# Patient Record
Sex: FEMALE | Race: WHITE | NOT HISPANIC OR LATINO | Employment: OTHER | URBAN - NONMETROPOLITAN AREA
[De-identification: names, ages, dates, MRNs, and addresses within clinical notes are randomized per-mention and may not be internally consistent; named-entity substitution may affect disease eponyms.]

---

## 2018-06-17 ENCOUNTER — OFFICE VISIT (OUTPATIENT)
Dept: URGENT CARE | Facility: PHYSICIAN GROUP | Age: 43
End: 2018-06-17
Payer: COMMERCIAL

## 2018-06-17 VITALS
DIASTOLIC BLOOD PRESSURE: 68 MMHG | WEIGHT: 163 LBS | TEMPERATURE: 98.3 F | SYSTOLIC BLOOD PRESSURE: 114 MMHG | RESPIRATION RATE: 12 BRPM | OXYGEN SATURATION: 95 % | HEART RATE: 94 BPM

## 2018-06-17 DIAGNOSIS — T14.8XXA BRUISING: ICD-10-CM

## 2018-06-17 PROCEDURE — 99204 OFFICE O/P NEW MOD 45 MIN: CPT | Performed by: FAMILY MEDICINE

## 2018-06-17 RX ORDER — LAMOTRIGINE 100 MG/1
TABLET ORAL
COMMUNITY
Start: 2018-05-26 | End: 2018-07-06 | Stop reason: SDUPTHER

## 2018-06-17 RX ORDER — GABAPENTIN 600 MG/1
TABLET ORAL
COMMUNITY
Start: 2018-06-12 | End: 2018-07-06 | Stop reason: SDUPTHER

## 2018-06-17 RX ORDER — CYCLOBENZAPRINE HCL 10 MG
TABLET ORAL
COMMUNITY
Start: 2018-06-12 | End: 2018-09-26 | Stop reason: SDUPTHER

## 2018-06-17 RX ORDER — TRAZODONE HYDROCHLORIDE 100 MG/1
TABLET ORAL
COMMUNITY
Start: 2018-04-24 | End: 2018-07-06 | Stop reason: SDUPTHER

## 2018-06-17 NOTE — PROGRESS NOTES
Chief Complaint:    Chief Complaint   Patient presents with   • Bleeding/Bruising     unexplained bruising       History of Present Illness:    This is a new problem. She has unpredictable bruising involving legs and feet bilaterally since April 2018. When the bruising is there, it can be tender. Overall problem is at least moderate severity and not getting better. No similar prior episodes. Nothing has been helpful.       Review of Systems:    Constitutional: Negative for fever, chills, and diaphoresis.   Eyes: Negative for change in vision, photophobia, pain, redness, and discharge.  ENT: Negative for ear pain, ear discharge, hearing loss, tinnitus, nasal congestion, nosebleeds, and sore throat.    Respiratory: Negative for cough, hemoptysis, sputum production, shortness of breath, wheezing, and stridor.    Cardiovascular: Negative for chest pain, palpitations, orthopnea, claudication, leg swelling, and PND.   Gastrointestinal: Negative for abdominal pain, nausea, vomiting, diarrhea, constipation, blood in stool, and melena.   Genitourinary: Negative for dysuria, urinary urgency, urinary frequency, hematuria, and flank pain.   Musculoskeletal: See HPI.  Skin: See HPI.  Neurological: Negative for dizziness, tingling, tremors, sensory change, speech change, focal weakness, loss of consciousness, and headaches.   Endo: Negative for polydipsia.   Heme: Does not bruise/bleed easily.   Psychiatric/Behavioral: No new symptoms.      Past Medical History:    History reviewed. No pertinent past medical history.    Past Surgical History:    History reviewed. No pertinent surgical history.    Social History:    Social History     Social History   • Marital status: Unknown     Spouse name: N/A   • Number of children: N/A   • Years of education: N/A     Social History Main Topics   • Smoking status: Never Smoker   • Smokeless tobacco: Never Used   • Alcohol use No   • Drug use: No   • Sexual activity: Not on file     Other  Topics Concern   • Not on file     Social History Narrative   • No narrative on file     Family History:    History reviewed. No pertinent family history.    Medications:    Cyclobenzaprine 10 mg  Gabapentin 600 mg  Lamictal 100 mg  Lorazepam  Loratadine  Fluticasone nasal spray    Allergies:    Allergies   Allergen Reactions   • Codeine        Vitals:    Vitals:    06/17/18 1328   BP: 114/68   Pulse: 94   Resp: 12   Temp: 36.8 °C (98.3 °F)   SpO2: 95%   Weight: 73.9 kg (163 lb)       Physical Exam:    Constitutional: Vital signs reviewed. Appears well-developed and well-nourished. No acute distress.   Eyes: Sclera white, conjunctivae clear.   ENT: External ears normal. Hearing normal.  Neck: Neck supple.   Pulmonary/Chest: Respirations non-labored.  Musculoskeletal: Normal gait. Normal range of motion. No muscular atrophy or weakness.  Neurological: Alert and oriented to person, place, and time. Muscle tone normal. Coordination normal.   Skin: Scattered fading bruises on both legs.  Psychiatric: Normal mood and affect. Behavior is normal. Judgment and thought content normal.       Assessment / Plan:    1. Bruising  - COMP METABOLIC PANEL; Future  - CBC WITH DIFFERENTIAL; Future  - TSH WITH REFLEX TO FT4; Future  - PT AND PTT      Discussed with her DDX and management options.    Advised ? etiology and I can order some labs to rule out conditions, understanding labs may be non-revealing.    Agreeable to labs ordered which she will likely return for tomorrow as we do not have lab here today.    Discussed possible referral to Hematologist for further evaluation - she does not want now.    Will contact her with lab results.

## 2018-06-19 ENCOUNTER — HOSPITAL ENCOUNTER (OUTPATIENT)
Dept: LAB | Facility: MEDICAL CENTER | Age: 43
End: 2018-06-19
Attending: FAMILY MEDICINE
Payer: COMMERCIAL

## 2018-06-19 DIAGNOSIS — R23.3 EASY BRUISING: ICD-10-CM

## 2018-06-19 DIAGNOSIS — R79.1 ELEVATED PARTIAL THROMBOPLASTIN TIME (PTT): ICD-10-CM

## 2018-06-19 DIAGNOSIS — T14.8XXA BRUISING: ICD-10-CM

## 2018-06-19 LAB
ALBUMIN SERPL BCP-MCNC: 3.8 G/DL (ref 3.2–4.9)
ALBUMIN/GLOB SERPL: 0.8 G/DL
ALP SERPL-CCNC: 108 U/L (ref 30–99)
ALT SERPL-CCNC: 48 U/L (ref 2–50)
ANION GAP SERPL CALC-SCNC: 4 MMOL/L (ref 0–11.9)
APTT PPP: 50.9 SEC (ref 24.7–36)
AST SERPL-CCNC: 44 U/L (ref 12–45)
BASOPHILS # BLD AUTO: 0.3 % (ref 0–1.8)
BASOPHILS # BLD: 0.01 K/UL (ref 0–0.12)
BILIRUB SERPL-MCNC: 0.3 MG/DL (ref 0.1–1.5)
BUN SERPL-MCNC: 8 MG/DL (ref 8–22)
CALCIUM SERPL-MCNC: 8.9 MG/DL (ref 8.5–10.5)
CHLORIDE SERPL-SCNC: 107 MMOL/L (ref 96–112)
CO2 SERPL-SCNC: 25 MMOL/L (ref 20–33)
CREAT SERPL-MCNC: 0.63 MG/DL (ref 0.5–1.4)
EOSINOPHIL # BLD AUTO: 0.07 K/UL (ref 0–0.51)
EOSINOPHIL NFR BLD: 2 % (ref 0–6.9)
ERYTHROCYTE [DISTWIDTH] IN BLOOD BY AUTOMATED COUNT: 50.6 FL (ref 35.9–50)
GLOBULIN SER CALC-MCNC: 4.7 G/DL (ref 1.9–3.5)
GLUCOSE SERPL-MCNC: 96 MG/DL (ref 65–99)
HCT VFR BLD AUTO: 37.6 % (ref 37–47)
HGB BLD-MCNC: 12.1 G/DL (ref 12–16)
IMM GRANULOCYTES # BLD AUTO: 0.02 K/UL (ref 0–0.11)
IMM GRANULOCYTES NFR BLD AUTO: 0.6 % (ref 0–0.9)
INR PPP: 1.05 (ref 0.87–1.13)
LYMPHOCYTES # BLD AUTO: 1.57 K/UL (ref 1–4.8)
LYMPHOCYTES NFR BLD: 44.1 % (ref 22–41)
MCH RBC QN AUTO: 31.9 PG (ref 27–33)
MCHC RBC AUTO-ENTMCNC: 32.2 G/DL (ref 33.6–35)
MCV RBC AUTO: 99.2 FL (ref 81.4–97.8)
MONOCYTES # BLD AUTO: 0.17 K/UL (ref 0–0.85)
MONOCYTES NFR BLD AUTO: 4.8 % (ref 0–13.4)
NEUTROPHILS # BLD AUTO: 1.72 K/UL (ref 2–7.15)
NEUTROPHILS NFR BLD: 48.2 % (ref 44–72)
NRBC # BLD AUTO: 0 K/UL
NRBC BLD-RTO: 0 /100 WBC
PLATELET # BLD AUTO: 171 K/UL (ref 164–446)
PMV BLD AUTO: 11.5 FL (ref 9–12.9)
POTASSIUM SERPL-SCNC: 3.9 MMOL/L (ref 3.6–5.5)
PROT SERPL-MCNC: 8.5 G/DL (ref 6–8.2)
PROTHROMBIN TIME: 13.4 SEC (ref 12–14.6)
RBC # BLD AUTO: 3.79 M/UL (ref 4.2–5.4)
SODIUM SERPL-SCNC: 136 MMOL/L (ref 135–145)
TSH SERPL DL<=0.005 MIU/L-ACNC: 1.65 UIU/ML (ref 0.38–5.33)
WBC # BLD AUTO: 3.6 K/UL (ref 4.8–10.8)

## 2018-06-19 PROCEDURE — 36415 COLL VENOUS BLD VENIPUNCTURE: CPT

## 2018-06-19 PROCEDURE — 80053 COMPREHEN METABOLIC PANEL: CPT

## 2018-06-19 PROCEDURE — 85730 THROMBOPLASTIN TIME PARTIAL: CPT

## 2018-06-19 PROCEDURE — 85610 PROTHROMBIN TIME: CPT

## 2018-06-19 PROCEDURE — 84443 ASSAY THYROID STIM HORMONE: CPT

## 2018-06-19 PROCEDURE — 85025 COMPLETE CBC W/AUTO DIFF WBC: CPT

## 2018-06-21 ENCOUNTER — TELEPHONE (OUTPATIENT)
Dept: HEMATOLOGY ONCOLOGY | Facility: MEDICAL CENTER | Age: 43
End: 2018-06-21

## 2018-06-22 NOTE — TELEPHONE ENCOUNTER
1st attempt to contact the patient.  LM on voicemail for patient requesting a call back to schedule a new patient hematology appointment.  NP/ Demetri/ Anusha/ Derek Paez- could be seen [place] if interested

## 2018-06-25 ENCOUNTER — HOSPITAL ENCOUNTER (OUTPATIENT)
Dept: LAB | Facility: MEDICAL CENTER | Age: 43
End: 2018-06-25
Attending: INTERNAL MEDICINE
Payer: COMMERCIAL

## 2018-06-25 ENCOUNTER — OFFICE VISIT (OUTPATIENT)
Dept: HEMATOLOGY ONCOLOGY | Facility: MEDICAL CENTER | Age: 43
End: 2018-06-25
Payer: COMMERCIAL

## 2018-06-25 ENCOUNTER — NON-PROVIDER VISIT (OUTPATIENT)
Dept: HEMATOLOGY ONCOLOGY | Facility: MEDICAL CENTER | Age: 43
End: 2018-06-25
Payer: COMMERCIAL

## 2018-06-25 ENCOUNTER — HOSPITAL ENCOUNTER (OUTPATIENT)
Facility: MEDICAL CENTER | Age: 43
End: 2018-06-25
Attending: INTERNAL MEDICINE
Payer: COMMERCIAL

## 2018-06-25 VITALS
TEMPERATURE: 99 F | HEIGHT: 64 IN | HEART RATE: 115 BPM | BODY MASS INDEX: 28.66 KG/M2 | DIASTOLIC BLOOD PRESSURE: 80 MMHG | WEIGHT: 167.88 LBS | RESPIRATION RATE: 18 BRPM | SYSTOLIC BLOOD PRESSURE: 118 MMHG | OXYGEN SATURATION: 98 %

## 2018-06-25 VITALS
RESPIRATION RATE: 18 BRPM | HEART RATE: 115 BPM | SYSTOLIC BLOOD PRESSURE: 118 MMHG | WEIGHT: 167 LBS | TEMPERATURE: 99 F | BODY MASS INDEX: 28.51 KG/M2 | DIASTOLIC BLOOD PRESSURE: 80 MMHG | OXYGEN SATURATION: 98 % | HEIGHT: 64 IN

## 2018-06-25 DIAGNOSIS — R23.3 ABNORMAL BRUISING: ICD-10-CM

## 2018-06-25 DIAGNOSIS — R23.3 EASY BRUISING: ICD-10-CM

## 2018-06-25 LAB
ALBUMIN SERPL BCP-MCNC: 3.9 G/DL (ref 3.2–4.9)
ALBUMIN/GLOB SERPL: 0.7 G/DL
ALP SERPL-CCNC: 113 U/L (ref 30–99)
ALT SERPL-CCNC: 25 U/L (ref 2–50)
ANION GAP SERPL CALC-SCNC: 4 MMOL/L (ref 0–11.9)
APTT 1H NP PPP: 51.3 SEC (ref 24.7–36)
APTT 1H P INC POOL PPP: 29.7 SEC (ref 24.7–36)
APTT 1H P INC PPP: 55.3 SEC (ref 24.7–36)
APTT IMM NP PPP: 49 SEC (ref 24.7–36)
APTT POOL PPP: 29.2 SEC (ref 24.7–36)
APTT PPP: 51.3 SEC (ref 24.7–36)
APTT PPP: 51.3 SEC (ref 24.7–36)
AST SERPL-CCNC: 28 U/L (ref 12–45)
BASOPHILS # BLD AUTO: 0.3 % (ref 0–1.8)
BASOPHILS # BLD: 0.01 K/UL (ref 0–0.12)
BILIRUB SERPL-MCNC: 0.4 MG/DL (ref 0.1–1.5)
BUN SERPL-MCNC: 10 MG/DL (ref 8–22)
CALCIUM SERPL-MCNC: 9.3 MG/DL (ref 8.5–10.5)
CHLORIDE SERPL-SCNC: 105 MMOL/L (ref 96–112)
CO2 SERPL-SCNC: 26 MMOL/L (ref 20–33)
COMMENT 1642: NORMAL
CREAT SERPL-MCNC: 0.77 MG/DL (ref 0.5–1.4)
EOSINOPHIL # BLD AUTO: 0.08 K/UL (ref 0–0.51)
EOSINOPHIL NFR BLD: 2.3 % (ref 0–6.9)
ERYTHROCYTE [DISTWIDTH] IN BLOOD BY AUTOMATED COUNT: 49.1 FL (ref 35.9–50)
GLOBULIN SER CALC-MCNC: 5.9 G/DL (ref 1.9–3.5)
GLUCOSE SERPL-MCNC: 74 MG/DL (ref 65–99)
HCT VFR BLD AUTO: 37.5 % (ref 37–47)
HGB BLD-MCNC: 12.6 G/DL (ref 12–16)
IMM GRANULOCYTES # BLD AUTO: 0.01 K/UL (ref 0–0.11)
IMM GRANULOCYTES NFR BLD AUTO: 0.3 % (ref 0–0.9)
LYMPHOCYTES # BLD AUTO: 1.67 K/UL (ref 1–4.8)
LYMPHOCYTES NFR BLD: 47.3 % (ref 22–41)
MCH RBC QN AUTO: 32.6 PG (ref 27–33)
MCHC RBC AUTO-ENTMCNC: 33.6 G/DL (ref 33.6–35)
MCV RBC AUTO: 97.2 FL (ref 81.4–97.8)
MONOCYTES # BLD AUTO: 0.19 K/UL (ref 0–0.85)
MONOCYTES NFR BLD AUTO: 5.4 % (ref 0–13.4)
MORPHOLOGY BLD-IMP: NORMAL
NEUTROPHILS # BLD AUTO: 1.57 K/UL (ref 2–7.15)
NEUTROPHILS NFR BLD: 44.4 % (ref 44–72)
NRBC # BLD AUTO: 0 K/UL
NRBC BLD-RTO: 0 /100 WBC
PLATELET # BLD AUTO: 209 K/UL (ref 164–446)
PMV BLD AUTO: 10.3 FL (ref 9–12.9)
POTASSIUM SERPL-SCNC: 4.1 MMOL/L (ref 3.6–5.5)
PROT SERPL-MCNC: 9.8 G/DL (ref 6–8.2)
RBC # BLD AUTO: 3.86 M/UL (ref 4.2–5.4)
SODIUM SERPL-SCNC: 135 MMOL/L (ref 135–145)
WBC # BLD AUTO: 3.5 K/UL (ref 4.8–10.8)

## 2018-06-25 PROCEDURE — 85250 CLOT FACTOR IX PTC/CHRSTMAS: CPT

## 2018-06-25 PROCEDURE — 36415 COLL VENOUS BLD VENIPUNCTURE: CPT

## 2018-06-25 PROCEDURE — 85025 COMPLETE CBC W/AUTO DIFF WBC: CPT

## 2018-06-25 PROCEDURE — 80053 COMPREHEN METABOLIC PANEL: CPT

## 2018-06-25 PROCEDURE — 36415 COLL VENOUS BLD VENIPUNCTURE: CPT | Performed by: INTERNAL MEDICINE

## 2018-06-25 PROCEDURE — 99204 OFFICE O/P NEW MOD 45 MIN: CPT | Performed by: INTERNAL MEDICINE

## 2018-06-25 PROCEDURE — 85730 THROMBOPLASTIN TIME PARTIAL: CPT

## 2018-06-25 PROCEDURE — 85732 THROMBOPLASTIN TIME PARTIAL: CPT

## 2018-06-25 PROCEDURE — 85240 CLOT FACTOR VIII AHG 1 STAGE: CPT

## 2018-06-25 ASSESSMENT — PAIN SCALES - GENERAL
PAINLEVEL: NO PAIN
PAINLEVEL: NO PAIN

## 2018-06-25 NOTE — PROGRESS NOTES
Augusta Real is a 42 y.o. female here for a non-provider visit for: Lab Draws  on 6/25/2018 at 8:53 AM    Procedure Performed: Venipuncture     Anatomical site: Right Antecubital Area (AC)    Equipment used: 21g    Labs drawn: CBC w/diff, CMP and APTT, APTT mixing studies    Ordering Provider: Dr. MARY Leyv    Ren By: Yani Esquivel, Onofre Ass't    Successful blood draw

## 2018-06-25 NOTE — PROGRESS NOTES
Consult Note: Hematology    Date of consultation: 6/25/2018     Referring provider: Derek Paez M.D.    Reason for consultation:   CC: abn bruising    History of presenting illness:   First seen in on 6/25/2018  Augusta Real  is a 42 y.o. year old female seen in clinic today for abnormal bruising seen on her lower legs which stated in April. The patient states she get associated swelling of her legs which comes and goes. No swelling today. bruising has cleared up      Review of Systems:  Constitutional: No fever, chills, weight loss ,malaise/fatigue.      All other review of systems are negative except what was mentioned above in the HPI.    Past Medical History:    Past Medical History:   Diagnosis Date   • Depression    • Fibromyalgia        Past surgical history:    Past Surgical History:   Procedure Laterality Date   • KNEE ARTHROSCOPY     • PRIMARY C SECTION     • TUBAL LIGATION         Allergies:  Codeine    Medications:    Current Outpatient Prescriptions   Medication Sig Dispense Refill   • gabapentin (NEURONTIN) 600 MG tablet      • lamoTRIgine (LAMICTAL) 100 MG Tab      • cyclobenzaprine (FLEXERIL) 10 MG Tab      • traZODone (DESYREL) 100 MG Tab      • LORAZEPAM PO Take  by mouth.     • Fluticasone Propionate (FLONASE NA) Spray  in nose.     • LORATADINE PO Take  by mouth.       No current facility-administered medications for this visit.        Social History:     Social History     Social History   • Marital status: Unknown     Spouse name: N/A   • Number of children: N/A   • Years of education: N/A     Occupational History   • [employer] Other     Social History Main Topics   • Smoking status: Current Every Day Smoker     Packs/day: 1.00     Years: 21.00   • Smokeless tobacco: Never Used   • Alcohol use No   • Drug use: No   • Sexual activity: Yes     Partners: Male     Other Topics Concern   • Not on file     Social History Narrative   • No narrative on file       Family History:     Family  "History   Problem Relation Age of Onset   • Cancer Mother      uterine   • Depression Father        Physical Exam:  Vitals:   /80   Pulse (!) 115   Temp 37.2 °C (99 °F)   Resp 18   Ht 1.633 m (5' 4.3\")   Wt 76.1 kg (167 lb 14.1 oz)   SpO2 98%   BMI 28.55 kg/m²     General: Not in acute distress,   HEENT: No pallor, icterus. Oropharynx clear.   Neck: Supple, no palpable masses.  Lymph nodes: No palpable cervical, supraclavicular, axillary or inguinal lymphadenopathy.    CVS: regular rate and rhythm, no rubs or gallops  RESP: Clear to auscultate bilaterally, no wheezing or crackles.   ABD: Soft, non tender, non distended, positive bowel sounds, no palpable organomegaly  EXT: No edema or cyanosis  CNS: Alert and oriented x3, No focal deficits.  Skin- No rash      Labs:   Hospital Outpatient Visit on 06/19/2018   Component Date Value Ref Range Status   • Sodium 06/19/2018 136  135 - 145 mmol/L Final   • Potassium 06/19/2018 3.9  3.6 - 5.5 mmol/L Final   • Chloride 06/19/2018 107  96 - 112 mmol/L Final   • Co2 06/19/2018 25  20 - 33 mmol/L Final   • Anion Gap 06/19/2018 4.0  0.0 - 11.9 Final   • Glucose 06/19/2018 96  65 - 99 mg/dL Final   • Bun 06/19/2018 8  8 - 22 mg/dL Final   • Creatinine 06/19/2018 0.63  0.50 - 1.40 mg/dL Final   • Calcium 06/19/2018 8.9  8.5 - 10.5 mg/dL Final   • AST(SGOT) 06/19/2018 44  12 - 45 U/L Final   • ALT(SGPT) 06/19/2018 48  2 - 50 U/L Final   • Alkaline Phosphatase 06/19/2018 108* 30 - 99 U/L Final   • Total Bilirubin 06/19/2018 0.3  0.1 - 1.5 mg/dL Final   • Albumin 06/19/2018 3.8  3.2 - 4.9 g/dL Final   • Total Protein 06/19/2018 8.5* 6.0 - 8.2 g/dL Final   • Globulin 06/19/2018 4.7* 1.9 - 3.5 g/dL Final   • A-G Ratio 06/19/2018 0.8  g/dL Final   • WBC 06/19/2018 3.6* 4.8 - 10.8 K/uL Final   • RBC 06/19/2018 3.79* 4.20 - 5.40 M/uL Final   • Hemoglobin 06/19/2018 12.1  12.0 - 16.0 g/dL Final   • Hematocrit 06/19/2018 37.6  37.0 - 47.0 % Final   • MCV 06/19/2018 99.2* 81.4 - " 97.8 fL Final   • MCH 06/19/2018 31.9  27.0 - 33.0 pg Final   • MCHC 06/19/2018 32.2* 33.6 - 35.0 g/dL Final   • RDW 06/19/2018 50.6* 35.9 - 50.0 fL Final   • Platelet Count 06/19/2018 171  164 - 446 K/uL Final   • MPV 06/19/2018 11.5  9.0 - 12.9 fL Final   • Neutrophils-Polys 06/19/2018 48.20  44.00 - 72.00 % Final   • Lymphocytes 06/19/2018 44.10* 22.00 - 41.00 % Final   • Monocytes 06/19/2018 4.80  0.00 - 13.40 % Final   • Eosinophils 06/19/2018 2.00  0.00 - 6.90 % Final   • Basophils 06/19/2018 0.30  0.00 - 1.80 % Final   • Immature Granulocytes 06/19/2018 0.60  0.00 - 0.90 % Final   • Nucleated RBC 06/19/2018 0.00  /100 WBC Final   • Neutrophils (Absolute) 06/19/2018 1.72* 2.00 - 7.15 K/uL Final    Includes immature neutrophils, if present.   • Lymphs (Absolute) 06/19/2018 1.57  1.00 - 4.80 K/uL Final   • Monos (Absolute) 06/19/2018 0.17  0.00 - 0.85 K/uL Final   • Eos (Absolute) 06/19/2018 0.07  0.00 - 0.51 K/uL Final   • Baso (Absolute) 06/19/2018 0.01  0.00 - 0.12 K/uL Final   • Immature Granulocytes (abs) 06/19/2018 0.02  0.00 - 0.11 K/uL Final   • NRBC (Absolute) 06/19/2018 0.00  K/uL Final   • TSH 06/19/2018 1.650  0.380 - 5.330 uIU/mL Final    Comment: Please note new reference ranges effective 12/14/2017 10:00 AM  Pregnant Females, 1st Trimester  0.050-3.700  Pregnant Females, 2nd Trimester  0.310-4.350  Pregnant Females, 3rd Trimester  0.410-5.180     • PT 06/19/2018 13.4  12.0 - 14.6 sec Final   • INR 06/19/2018 1.05  0.87 - 1.13 Final    Comment: INR - Non-therapeutic Reference Range: 0.87-1.13  INR - Therapeutic Reference Range: 2.0-4.0     • APTT 06/19/2018 50.9* 24.7 - 36.0 sec Final    Therapeutic Heparin Range: 63-96 seconds   • GFR If  06/19/2018 >60  >60 mL/min/1.73 m 2 Final   • GFR If Non  06/19/2018 >60  >60 mL/min/1.73 m 2 Final         Imaging:    Assessment and Plan:  -Elevated PTT  -labs above reviewed with pt  -recheck labs  -check mixing study  -follow  up in 2 weeks    -Elevated alk phos  -recheck labs today            She agreed and verbalized her agreement and understanding with the current plan.  I answered all questions and concerns she has at this time.  Dear  Derek Paez M.D.,  Thank you for allowing me to participate in her care.    All labs and/or imaging studies mentioned in the note above were reviewed with and explained to the patient as a pertain to medical decision making.    Please note that this dictation was created using voice recognition software. I have made every reasonable attempt to correct obvious errors, but I expect that there are errors of grammar and possibly content that I did not discover before finalizing the note.      SIGNATURES:  Wilian Levy    CC:  Pcp Pt States None  Derek Paez M.D.

## 2018-06-26 DIAGNOSIS — R23.3 EASY BRUISING: ICD-10-CM

## 2018-06-26 DIAGNOSIS — R79.1 PROLONGED PTT: ICD-10-CM

## 2018-06-26 LAB
FACT IX ACT/NOR PPP: 120 % (ref 75–125)
FACT VIII ACT/NOR PPP: 93 % (ref 45–145)
INHIBITOR INDICATED 1863: NO
INHIBITOR INDICATED 1863: NO

## 2018-07-03 ENCOUNTER — HOSPITAL ENCOUNTER (OUTPATIENT)
Dept: LAB | Facility: MEDICAL CENTER | Age: 43
End: 2018-07-03
Attending: INTERNAL MEDICINE
Payer: COMMERCIAL

## 2018-07-03 DIAGNOSIS — R23.3 EASY BRUISING: ICD-10-CM

## 2018-07-03 DIAGNOSIS — R79.1 PROLONGED PTT: ICD-10-CM

## 2018-07-03 PROCEDURE — 36415 COLL VENOUS BLD VENIPUNCTURE: CPT

## 2018-07-03 PROCEDURE — 85245 CLOT FACTOR VIII VW RISTOCTN: CPT

## 2018-07-03 PROCEDURE — 85280 CLOT FACTOR XII HAGEMAN: CPT

## 2018-07-03 PROCEDURE — 85270 CLOT FACTOR XI PTA: CPT

## 2018-07-03 PROCEDURE — 85246 CLOT FACTOR VIII VW ANTIGEN: CPT

## 2018-07-04 LAB
FACT XI ACT/NOR PPP: 81 % (ref 50–150)
INHIBITOR INDICATED 1863: NO

## 2018-07-06 ENCOUNTER — OFFICE VISIT (OUTPATIENT)
Dept: MEDICAL GROUP | Facility: PHYSICIAN GROUP | Age: 43
End: 2018-07-06
Payer: COMMERCIAL

## 2018-07-06 VITALS
SYSTOLIC BLOOD PRESSURE: 122 MMHG | DIASTOLIC BLOOD PRESSURE: 74 MMHG | BODY MASS INDEX: 28.95 KG/M2 | OXYGEN SATURATION: 97 % | HEART RATE: 112 BPM | HEIGHT: 64 IN | WEIGHT: 169.6 LBS | RESPIRATION RATE: 16 BRPM | TEMPERATURE: 97.9 F

## 2018-07-06 DIAGNOSIS — M79.7 FIBROMYALGIA: ICD-10-CM

## 2018-07-06 DIAGNOSIS — Z79.899 CHRONIC PRESCRIPTION BENZODIAZEPINE USE: ICD-10-CM

## 2018-07-06 DIAGNOSIS — Z12.39 SCREENING FOR BREAST CANCER: ICD-10-CM

## 2018-07-06 DIAGNOSIS — B35.3 TINEA PEDIS OF BOTH FEET: ICD-10-CM

## 2018-07-06 DIAGNOSIS — R23.3 EASY BRUISING: ICD-10-CM

## 2018-07-06 DIAGNOSIS — J30.2 SEASONAL ALLERGIC RHINITIS, UNSPECIFIED TRIGGER: ICD-10-CM

## 2018-07-06 DIAGNOSIS — G47.09 OTHER INSOMNIA: ICD-10-CM

## 2018-07-06 DIAGNOSIS — F43.10 PTSD (POST-TRAUMATIC STRESS DISORDER): ICD-10-CM

## 2018-07-06 DIAGNOSIS — R00.0 RAPID RESTING HEART RATE: ICD-10-CM

## 2018-07-06 LAB
MISCELLANEOUS LAB RESULT MISCLAB: NORMAL
VWF AG ACT/NOR PPP IA: 194 % (ref 52–214)
VWF:RCO ACT/NOR PPP PL AGG: 132 % (ref 51–215)

## 2018-07-06 PROCEDURE — 99214 OFFICE O/P EST MOD 30 MIN: CPT | Performed by: NURSE PRACTITIONER

## 2018-07-06 RX ORDER — LORAZEPAM 1 MG/1
1 TABLET ORAL EVERY 8 HOURS PRN
Qty: 30 TAB | Refills: 0 | Status: SHIPPED | OUTPATIENT
Start: 2018-07-06 | End: 2018-10-04

## 2018-07-06 RX ORDER — CLOTRIMAZOLE 1 %
CREAM (GRAM) TOPICAL
Qty: 2 TUBE | Refills: 1 | Status: SHIPPED | OUTPATIENT
Start: 2018-07-06 | End: 2019-12-05

## 2018-07-06 RX ORDER — TRAZODONE HYDROCHLORIDE 100 MG/1
TABLET ORAL
Qty: 90 TAB | Refills: 3 | Status: SHIPPED | OUTPATIENT
Start: 2018-07-06 | End: 2018-10-30

## 2018-07-06 RX ORDER — GABAPENTIN 600 MG/1
600 TABLET ORAL 3 TIMES DAILY
Qty: 90 TAB | Refills: 3 | Status: SHIPPED | OUTPATIENT
Start: 2018-07-06 | End: 2018-12-31 | Stop reason: SDUPTHER

## 2018-07-06 RX ORDER — LAMOTRIGINE 100 MG/1
100 TABLET ORAL DAILY
Qty: 90 TAB | Refills: 1 | Status: SHIPPED | OUTPATIENT
Start: 2018-07-06 | End: 2018-10-30 | Stop reason: SDUPTHER

## 2018-07-06 ASSESSMENT — PATIENT HEALTH QUESTIONNAIRE - PHQ9: CLINICAL INTERPRETATION OF PHQ2 SCORE: 0

## 2018-07-06 NOTE — ASSESSMENT & PLAN NOTE
Patient reports chronic health problem of posttraumatic stress disorder.  She reports it is well managed with Lamictal 100 mg daily and Lorazepam as needed.  She reports she rarely has to take lorazepam, last took a month ago.  Associative symptoms include anxiety.  She reports she has seen psychiatry in the past while living in [place].  She has not seen psychiatry since moving to the area a few years ago.  She denies depression or suicidal, homicidal thoughts.  Patient is requesting a refill of Lamictal and lorazepam.  I explained to patient that I would like her to be evaluated by psychiatry.  I will refill her medications until she has a consultation with psychiatry.  At that time I can continue to prescribe medications based on psychiatry's recommendations.  I have refilled Lamictal and Lorazepam today.  Patient takes lorazepam sparingly.  We did review the risks of benzodiazepines, and not to drink alcohol for take opioids with medication.  Patient understands that refills will be done at appointment only in that for safety reasons will have random urine drug screens.

## 2018-07-06 NOTE — ASSESSMENT & PLAN NOTE
Patient reports seasonal allergies, worse in the spring and summer.  She takes loratadine and uses Flonase nasal spray during these seasons with good relief.

## 2018-07-06 NOTE — ASSESSMENT & PLAN NOTE
Patient reports that she has had easy bruising in her lower extremities for the last 3 months.  This is currently being worked up and managed by hematology.

## 2018-07-06 NOTE — ASSESSMENT & PLAN NOTE
Pulse at appointment today is 112/min.  Pulse at 6/25 appointment with hematology was 115/min.  Patient reports that her pulse rate is high when she is anxious.  She wears a Fit Bit all the time, and she reports her pulse is usually in the 90's.  Patient denies chest pain, dyspnea.  She will notify me if pulse is often in the 100's at rest.  ER precautions reviewed.

## 2018-07-06 NOTE — ASSESSMENT & PLAN NOTE
Patient reports dry, scaly skin on bilateral heels.  Onset several months ago.  Has tried multiple lotions.  Has not tried antifungal cream or steroid cream.  Denies erythema, bleeding, edema.

## 2018-07-06 NOTE — ASSESSMENT & PLAN NOTE
Patient reports this is a chronic health problem that she has had for at least 10 years.  Multiple areas of pain.  She was taking gabapentin 600 mg 4 times a day, but has decreased it to 3 times a day with good relief.  She reports that she uses CBD oil to areas of pain with good relief.  She will take cyclobenzaprine as needed.

## 2018-07-06 NOTE — ASSESSMENT & PLAN NOTE
Patient reports this is a chronic health problem that is well managed with lifestyle measures and medications.  She reports she takes Benadryl 50 mg nightly.  Occasionally if that does not work she will take trazodone.  She reports that trazodone makes her feel somewhat groggy the next day, so avoids taking it if she can help it.

## 2018-07-06 NOTE — PROGRESS NOTES
CC: To establish care and for chronic health problems    HISTORY OF THE PRESENT ILLNESS: Patient is a 42 y.o. female. This pleasant patient is here today to establish care and for chronic health problems fibromyalgia, insomnia, PTSD, and for try scaly skin on bilateral feet. Patient's previous primary care provider was at [facility].  She reports she saw provider once and received medication refills for a year.    Health Maintenance: Patient reports Pap smear 2 years ago with normal results.      Fibromyalgia  Patient reports this is a chronic health problem that she has had for at least 10 years.  Multiple areas of pain.  She was taking gabapentin 600 mg 4 times a day, but has decreased it to 3 times a day with good relief.  She reports that she uses CBD oil to areas of pain with good relief.  She will take cyclobenzaprine as needed.      Seasonal allergies  Patient reports seasonal allergies, worse in the spring and summer.  She takes loratadine and uses Flonase nasal spray during these seasons with good relief.      Other insomnia  Patient reports this is a chronic health problem that is well managed with lifestyle measures and medications.  She reports she takes Benadryl 50 mg nightly.  Occasionally if that does not work she will take trazodone.  She reports that trazodone makes her feel somewhat groggy the next day, so avoids taking it if she can help it.      PTSD (post-traumatic stress disorder)  Patient reports chronic health problem of posttraumatic stress disorder.  She reports it is well managed with Lamictal 100 mg daily and Lorazepam as needed.  She reports she rarely has to take lorazepam, last took a month ago.  Associative symptoms include anxiety.  She reports she has seen psychiatry in the past while living in [place].  She has not seen psychiatry since moving to the area a few years ago.  She denies depression or suicidal, homicidal thoughts.  Patient is requesting a refill of Lamictal and  lorazepam.  I explained to patient that I would like her to be evaluated by psychiatry.  I will refill her medications until she has a consultation with psychiatry.  At that time I can continue to prescribe medications based on psychiatry's recommendations.  I have refilled Lamictal and Lorazepam today.  Patient takes lorazepam sparingly.  We did review the risks of benzodiazepines, and not to drink alcohol for take opioids with medication.  Patient understands that refills will be done at appointment only in that for safety reasons will have random urine drug screens.      Tinea pedis of both feet  Patient reports dry, scaly skin on bilateral heels.  Onset several months ago.  Has tried multiple lotions.  Has not tried antifungal cream or steroid cream.  Denies erythema, bleeding, edema.    Rapid resting heart rate  Pulse at appointment today is 112/min.  Pulse at 6/25 appointment with hematology was 115/min.  Patient reports that her pulse rate is high when she is anxious.  She wears a Fit Bit all the time, and she reports her pulse is usually in the 90's.  Patient denies chest pain, dyspnea.  She will notify me if pulse is often in the 100's at rest.  ER precautions reviewed.    Easy bruising  Patient reports that she has had easy bruising in her lower extremities for the last 3 months.  This is currently being worked up and managed by hematology.      Allergies: Codeine    Current Outpatient Prescriptions Ordered in [facility]   Medication Sig Dispense Refill   • gabapentin (NEURONTIN) 600 MG tablet Take 1 Tab by mouth 3 times a day. 90 Tab 3   • clotrimazole (LOTRIMIN) 1 % Cream Apply to affected area twice a day. 2 Tube 1   • lamoTRIgine (LAMICTAL) 100 MG Tab Take 1 Tab by mouth every day. 90 Tab 1   • LORazepam (ATIVAN) 1 MG Tab Take 1 Tab by mouth every 8 hours as needed for Anxiety for up to 90 days. 30 Tab 0   • traZODone (DESYREL) 100 MG Tab Take one tab at bedtime as needed for sleep. 90 Tab 3   •  cyclobenzaprine (FLEXERIL) 10 MG Tab      • Fluticasone Propionate (FLONASE NA) Spray  in nose.     • LORATADINE PO Take  by mouth.       No current Epic-ordered facility-administered medications on file.        Past Medical History:   Diagnosis Date   • Depression    • Fibromyalgia        Past Surgical History:   Procedure Laterality Date   • DENTAL EXTRACTION(S)  1996   • KNEE ARTHROSCOPY     • PRIMARY C SECTION     • TUBAL LIGATION         Social History   Substance Use Topics   • Smoking status: Current Every Day Smoker     Packs/day: 0.50     Years: 21.00   • Smokeless tobacco: Never Used   • Alcohol use No      Comment: rare       Family History   Problem Relation Age of Onset   • Cancer Mother      uterine   • Other Mother      celiac   • Depression Father    • Lung Disease Father    • Bladder cancer Father    • Other Father      drug addict   • Heart Disease Sister    • Other Sister      mental retardation   • Bipolar disorder Brother    • Other Brother      addiction   • Breast Cancer Maternal Grandmother    • Other Maternal Grandmother      stroke   • Prostate cancer Maternal Grandfather    • Heart Disease Paternal Grandmother    • Heart Disease Paternal Grandfather    • Other Brother      paranoid schizophrenia, drug addiction       ROS:     - Constitutional: Negative for fever, chills, unexpected weight change.     - HEENT: Negative for headaches, vision changes.      - Respiratory: Negative for cough, dyspnea, and wheezing.      - Cardiovascular: Negative for chest pain, palpitations, and bilateral lower extremity edema.     - Gastrointestinal: Negative for nausea, vomiting, abdominal pain.     - Genitourinary: Negative for dysuria.    - Musculoskeletal: As in HPI.     - Skin: Negative for rash, itching, cyanotic skin color change.     - Neurological: Negative for dizziness, tingling.     - Endo/Heme/Allergies: As in HPI     - Psychiatric/Behavioral: As in HPI.           Exam: Blood pressure 122/74,  "pulse (!) 112, temperature 36.6 °C (97.9 °F), resp. rate 16, height 1.633 m (5' 4.3\"), weight 76.9 kg (169 lb 9.6 oz), SpO2 97 %. Body mass index is 28.84 kg/m².    General: Alert, pleasant, well nourished, well developed female in NAD  HEENT: Normocephalic. Eyes conjunctiva clear lids without ptosis, pupils equal and reactive to light, ears normal shape and contour, canals are clear bilaterally, tympanic membranes are pearly gray with good light reflex, nasal mucosa without erythema and drainage, oropharynx is without erythema, edema or exudates.   Neck: Supple without bruit. Thyroid is not enlarged.  Pulmonary: Clear to ausculation.  Normal effort. No rales, ronchi, or wheezing.  Cardiovascular: Normal rate and rhythm without murmur. Carotid and radial pulses are intact and equal bilaterally.  No lower extremity edema.  Abdomen: Soft, nontender, nondistended. Normal bowel sounds. Liver and spleen are not palpable  Neurologic: Grossly nonfocal  Lymph: No cervical or supraclavicular lymph nodes are palpable  Skin: Warm and dry.  Bilateral feet with dry, scaly, hyperkeratotic areas in mocassin-like distribution.  No edema or fissure.  Musculoskeletal: Normal gait.   Psych: Normal mood and affect. Alert and oriented. Judgment and insight is normal.    Please note that this dictation was created using voice recognition software. I have made every reasonable attempt to correct obvious errors, but I expect that there are errors of grammar and possibly content that I did not discover before finalizing the note.      Assessment/Plan  1. Fibromyalgia  Continue with gabapentin, cyclobenzaprine, and lifestyle measures.  - gabapentin (NEURONTIN) 600 MG tablet; Take 1 Tab by mouth 3 times a day.  Dispense: 90 Tab; Refill: 3    2. Other insomnia  Continue with Benadryl and as needed trazodone.  -TRAZODONE 100MG    3. PTSD (post-traumatic stress disorder)  Continue with medications.  Refer to psychiatry for consultation " recommendations.  - lamoTRIgine (LAMICTAL) 100 MG Tab; Take 1 Tab by mouth every day.  Dispense: 90 Tab; Refill: 1  - LORazepam (ATIVAN) 1 MG Tab; Take 1 Tab by mouth every 8 hours as needed for Anxiety for up to 90 days.  Dispense: 30 Tab; Refill: 0  - REFERRAL TO PSYCHIATRY    4. Tinea pedis of both feet  Instructions for medication reviewed with patient.  She will return to clinic if symptoms do not improve or worsen over the next few weeks.  - clotrimazole (LOTRIMIN) 1 % Cream; Apply to affected area twice a day.  Dispense: 2 Tube; Refill: 1    5. Chronic prescription benzodiazepine use  Specimen for urine drug screen obtained today.  Reviewed consent for opiate prescription with patient.  Patient signed consent and expressed understanding.  Patient understands that refills will be done at appointment only for controlled substances.  reviewed, no inconsistencies.  - [facility] SCREEN; Future  - Consent for Opiate Prescription      6. Screening for breast cancer  Ordered.  - [facility] SCREENING BILAT W/JESUS W/CAD; Future    7. Seasonal allergic rhinitis, unspecified trigger  Continue with loratadine and Flonase.    8. Rapid resting heart rate  Continue to monitor.    Patient will return to clinic in 3 months for fibromyalgia, insomnia, PTSD.

## 2018-07-10 ENCOUNTER — HOSPITAL ENCOUNTER (OUTPATIENT)
Dept: LAB | Facility: MEDICAL CENTER | Age: 43
End: 2018-07-10
Attending: INTERNAL MEDICINE
Payer: COMMERCIAL

## 2018-07-10 ENCOUNTER — OFFICE VISIT (OUTPATIENT)
Dept: HEMATOLOGY ONCOLOGY | Facility: PHYSICIAN GROUP | Age: 43
End: 2018-07-10
Payer: COMMERCIAL

## 2018-07-10 VITALS
TEMPERATURE: 97.9 F | RESPIRATION RATE: 16 BRPM | HEIGHT: 64 IN | OXYGEN SATURATION: 97 % | WEIGHT: 166 LBS | HEART RATE: 114 BPM | BODY MASS INDEX: 28.34 KG/M2 | DIASTOLIC BLOOD PRESSURE: 78 MMHG | SYSTOLIC BLOOD PRESSURE: 128 MMHG

## 2018-07-10 DIAGNOSIS — R23.3 EASY BRUISING: ICD-10-CM

## 2018-07-10 DIAGNOSIS — R74.8 ELEVATED SERUM ALKALINE PHOSPHATASE LEVEL: ICD-10-CM

## 2018-07-10 LAB
ALBUMIN SERPL BCP-MCNC: 4.1 G/DL (ref 3.2–4.9)
ALBUMIN/GLOB SERPL: 0.7 G/DL
ALP SERPL-CCNC: 109 U/L (ref 30–99)
ALT SERPL-CCNC: 21 U/L (ref 2–50)
ANION GAP SERPL CALC-SCNC: 7 MMOL/L (ref 0–11.9)
APTT PPP: 37.4 SEC (ref 24.7–36)
AST SERPL-CCNC: 25 U/L (ref 12–45)
BILIRUB SERPL-MCNC: 0.4 MG/DL (ref 0.1–1.5)
BUN SERPL-MCNC: 9 MG/DL (ref 8–22)
CALCIUM SERPL-MCNC: 9.6 MG/DL (ref 8.5–10.5)
CHLORIDE SERPL-SCNC: 103 MMOL/L (ref 96–112)
CO2 SERPL-SCNC: 24 MMOL/L (ref 20–33)
CREAT SERPL-MCNC: 0.89 MG/DL (ref 0.5–1.4)
GLOBULIN SER CALC-MCNC: 5.6 G/DL (ref 1.9–3.5)
GLUCOSE SERPL-MCNC: 97 MG/DL (ref 65–99)
INR PPP: 1.07 (ref 0.87–1.13)
LDH SERPL L TO P-CCNC: 167 U/L (ref 107–266)
POTASSIUM SERPL-SCNC: 3.7 MMOL/L (ref 3.6–5.5)
PROT SERPL-MCNC: 9.7 G/DL (ref 6–8.2)
PROTHROMBIN TIME: 13.6 SEC (ref 12–14.6)
SODIUM SERPL-SCNC: 134 MMOL/L (ref 135–145)

## 2018-07-10 PROCEDURE — 36415 COLL VENOUS BLD VENIPUNCTURE: CPT

## 2018-07-10 PROCEDURE — 80053 COMPREHEN METABOLIC PANEL: CPT

## 2018-07-10 PROCEDURE — 85730 THROMBOPLASTIN TIME PARTIAL: CPT

## 2018-07-10 PROCEDURE — 84160 ASSAY OF PROTEIN ANY SOURCE: CPT

## 2018-07-10 PROCEDURE — 84165 PROTEIN E-PHORESIS SERUM: CPT

## 2018-07-10 PROCEDURE — 99213 OFFICE O/P EST LOW 20 MIN: CPT | Performed by: INTERNAL MEDICINE

## 2018-07-10 PROCEDURE — 85007 BL SMEAR W/DIFF WBC COUNT: CPT

## 2018-07-10 PROCEDURE — 83615 LACTATE (LD) (LDH) ENZYME: CPT

## 2018-07-10 PROCEDURE — 85610 PROTHROMBIN TIME: CPT

## 2018-07-10 PROCEDURE — 85027 COMPLETE CBC AUTOMATED: CPT

## 2018-07-10 ASSESSMENT — PAIN SCALES - GENERAL: PAINLEVEL: 7=MODERATE-SEVERE PAIN

## 2018-07-10 NOTE — PROGRESS NOTES
Consult Note: Hematology    Date of consultation: 7/10/2018     Referring provider: Derek Paez M.D.    Reason for consultation:   CC: abn bruising    History of presenting illness:   First seen in on 6/25/2018  Augusta Real  is a 42 y.o. year old female seen in clinic today for abnormal bruising seen on her lower legs which stated in April. The patient states she get associated swelling of her legs which comes and goes. No swelling today. bruising has cleared up    Interval History: 7/10/2018  Augusta Real is a 42 y.o. female seen in clinic today for follow up of her lab work. She was in [place] last week and states she had unprovoked bruising of her lower legs which did dissipate over the last few days.      Review of Systems:  Constitutional: No fever, chills, weight loss ,malaise/fatigue.      All other review of systems are negative except what was mentioned above in the HPI.    Past Medical History:    Past Medical History:   Diagnosis Date   • Depression    • Fibromyalgia        Past surgical history:    Past Surgical History:   Procedure Laterality Date   • DENTAL EXTRACTION(S)  1996   • KNEE ARTHROSCOPY     • PRIMARY C SECTION     • TUBAL LIGATION         Allergies:  Codeine    Medications:    Current Outpatient Prescriptions   Medication Sig Dispense Refill   • DiphenhydrAMINE HCl (BENADRYL ALLERGY PO) Take  by mouth.     • gabapentin (NEURONTIN) 600 MG tablet Take 1 Tab by mouth 3 times a day. 90 Tab 3   • lamoTRIgine (LAMICTAL) 100 MG Tab Take 1 Tab by mouth every day. 90 Tab 1   • traZODone (DESYREL) 100 MG Tab Take one tab at bedtime as needed for sleep. 90 Tab 3   • LORATADINE PO Take  by mouth.     • clotrimazole (LOTRIMIN) 1 % Cream Apply to affected area twice a day. 2 Tube 1   • LORazepam (ATIVAN) 1 MG Tab Take 1 Tab by mouth every 8 hours as needed for Anxiety for up to 90 days. 30 Tab 0   • cyclobenzaprine (FLEXERIL) 10 MG Tab      • Fluticasone Propionate (FLONASE  "NA) Spray  in nose.       No current facility-administered medications for this visit.        Social History:     Social History     Social History   • Marital status:      Spouse name: N/A   • Number of children: N/A   • Years of education: N/A     Occupational History   • [employer] Other     Social History Main Topics   • Smoking status: Current Every Day Smoker     Packs/day: 0.50     Years: 21.00   • Smokeless tobacco: Never Used   • Alcohol use No      Comment: rare   • Drug use: No      Comment: topical lotion with THC for fibromyalgia   • Sexual activity: Yes     Partners: Male     Birth control/ protection: Surgical     Other Topics Concern   • Not on file     Social History Narrative   • No narrative on file       Family History:     Family History   Problem Relation Age of Onset   • Cancer Mother      uterine   • Other Mother      celiac   • Depression Father    • Lung Disease Father    • Bladder cancer Father    • Other Father      drug addict   • Heart Disease Sister    • Other Sister      mental retardation   • Bipolar disorder Brother    • Other Brother      addiction   • Breast Cancer Maternal Grandmother    • Other Maternal Grandmother      stroke   • Prostate cancer Maternal Grandfather    • Heart Disease Paternal Grandmother    • Heart Disease Paternal Grandfather    • Other Brother      paranoid schizophrenia, drug addiction       Physical Exam:  Vitals:   /78   Pulse (!) 114   Temp 36.6 °C (97.9 °F)   Resp 16   Ht 1.633 m (5' 4.3\")   Wt 75.3 kg (166 lb)   SpO2 97%   Breastfeeding? No   BMI 28.23 kg/m²     General: Not in acute distress,   HEENT: No pallor, icterus. Oropharynx clear.   Neck: Supple, no palpable masses.  Lymph nodes: No palpable cervical, supraclavicular, axillary or inguinal lymphadenopathy.    CVS: regular rate and rhythm, no rubs or gallops  RESP: Clear to auscultate bilaterally, no wheezing or crackles.   ABD: Soft, non tender, non distended, positive bowel " sounds, no palpable organomegaly  EXT: No edema or cyanosis  CNS: Alert and oriented x3, No focal deficits.  Skin- yellowish bruise on the dorsal surface of the left leg mid shin      Labs:   Results for VENECIA PINK (MRN [id number]) as of 7/10/2018 11:10   Ref. Range 6/25/2018 08:59 6/25/2018 17:52 6/25/2018 17:52 7/3/2018 09:28 7/3/2018 09:41   APTT Latest Ref Range: 24.7 - 36.0 sec 51.3 (H)       Factor VIII Latest Ref Range: 45.0 - 145.0 %  93.0      Factor IX Latest Ref Range: 75.0 - 125.0 %   120.0     Factor XI Latest Ref Range: 50.0 - 150.0 %    81.0    Inhibitor Indicated Unknown  No No No    APTT Normal Plasma - Im Latest Ref Range: 24.7 - 36.0 sec 29.2       APTT Normal Plasma - 37 Latest Ref Range: 24.7 - 36.0 sec 29.7       APTT Patient Plasma - Im Latest Ref Range: 24.7 - 36.0 sec 51.3 (H)       APTT Patient Plasma - 37 Latest Ref Range: 24.7 - 36.0 sec 55.3 (H)       APTT Mixing Study Im Latest Ref Range: 24.7 - 36.0 sec 49.0 (H)       APTT Mixing Study - 37 Latest Ref Range: 24.7 - 36.0 sec 51.3 (H)       VWF Activity Latest Ref Range: 51 - 215 %     132   vWF Antigen Latest Ref Range: 52 - 214 %     194     Hospital Outpatient Visit on 07/03/2018   Component Date Value Ref Range Status   • Factor XI 07/03/2018 81.0  50.0 - 150.0 % Final   • Inhibitor Indicated 07/03/2018 No   Final   • VWF Activity 07/03/2018 132  51 - 215 % Final    Comment: REFERENCE INTERVAL: von Willebrand Factor, Activity (RCF)  Access complete set of age- and/or gender-specific reference  intervals for this test in the [facility] Test Directory  ([email or web address]).  Performed by [facility],  [address] [place] [id number] [phone]  [email or web address], Narciso Rosenthal MD - Lab. Director     • vWF Antigen 07/03/2018 194  52 - 214 % Final    Comment: REFERENCE INTERVAL: von Willebrand Factor, Antigen  Access complete set of age- and/or gender-specific reference  intervals for this test in the [facility] Test  Directory  ([email or web address]).  Performed by [facility],  [address] [place] [address] [phone]  [email or web address], Narciso Rosenthal MD - Lab. Director     • [facility] [place] 07/03/2018 SEE NOTE   Final    Comment: Test name                     Result Flag Units  RefIntvl  -------------------------------------------------------------  Factor XII, Activity        See Note           %   Factor XII activity is at least 154 percent. However, the factor  XII activity in this sample cannot be accurately quantitated due  to non-parallelism. In this functional clot-based assay, factor  XII activity is determined using a modified partial thromboplastin  time (PTT). Non-parallelism can occur with a specific factor  inhibitor directed against a clotting factor involved in the PTT  reaction, inhibitor drugs such as heparin (therapy with  unfractionated or low molecular weight heparin or contamination  from a line), direct factor Xa inhibitors (such as rivaroxaban,  apixaban, or edoxaban), direct thrombin inhibitors (such as  dabigatran, argatroban, or bivalirudin), or a non-specific  inhibitor such as a lupus anticoagulant. Proper interpretation  requires correlation with clinical information and the r                           esults of  any additional laboratory testing.  Performed by [facility],  [address] [facility] [facility] [place] [address] [phone]  [email or web address], Narciso Rosenthal MD - Lab. Director         Imaging:    Assessment and Plan:  -Elevated PTT  -mixing studies indicate inhibitor however all blood work has been neg  -recheck PTT today   -pt states she has unprovoked bruising last week, will refer to tertiary care centre    -Elevated alk phos  -recheck labs   -will do usg liver if continued to be elevated     -elevated globulin   -check SPEP      She agreed and verbalized her agreement and understanding with the current plan.  I answered all questions and concerns she has at this time.  Dear  Derek Paez,  M.D.,  Thank you for allowing me to participate in her care.    All labs and/or imaging studies mentioned in the note above were reviewed with and explained to the patient as a pertain to medical decision making.    Please note that this dictation was created using voice recognition software. I have made every reasonable attempt to correct obvious errors, but I expect that there are errors of grammar and possibly content that I did not discover before finalizing the note.      SIGNATURES:  Wilian Levy    CC:  Pcp Pt States None  Derek Paez M.D.

## 2018-07-11 LAB
BASOPHILS # BLD AUTO: 0 % (ref 0–1.8)
BASOPHILS # BLD: 0 K/UL (ref 0–0.12)
EOSINOPHIL # BLD AUTO: 0.03 K/UL (ref 0–0.51)
EOSINOPHIL NFR BLD: 1 % (ref 0–6.9)
ERYTHROCYTE [DISTWIDTH] IN BLOOD BY AUTOMATED COUNT: 57.9 FL (ref 35.9–50)
HCT VFR BLD AUTO: 39.6 % (ref 37–47)
HGB BLD-MCNC: 12.3 G/DL (ref 12–16)
LYMPHOCYTES # BLD AUTO: 1.89 K/UL (ref 1–4.8)
LYMPHOCYTES NFR BLD: 59 % (ref 22–41)
MANUAL DIFF BLD: NORMAL
MCH RBC QN AUTO: 33 PG (ref 27–33)
MCHC RBC AUTO-ENTMCNC: 31.1 G/DL (ref 33.6–35)
MCV RBC AUTO: 106.2 FL (ref 81.4–97.8)
MONOCYTES # BLD AUTO: 0.16 K/UL (ref 0–0.85)
MONOCYTES NFR BLD AUTO: 5 % (ref 0–13.4)
MORPHOLOGY BLD-IMP: NORMAL
NEUTROPHILS # BLD AUTO: 1.12 K/UL (ref 2–7.15)
NEUTROPHILS NFR BLD: 33 % (ref 44–72)
NEUTS BAND NFR BLD MANUAL: 2 % (ref 0–10)
NRBC # BLD AUTO: 0.02 K/UL
NRBC BLD-RTO: 0.6 /100 WBC
PLATELET # BLD AUTO: 199 K/UL (ref 164–446)
PMV BLD AUTO: 12 FL (ref 9–12.9)
RBC # BLD AUTO: 3.73 M/UL (ref 4.2–5.4)
WBC # BLD AUTO: 3.2 K/UL (ref 4.8–10.8)

## 2018-07-13 LAB
ALBUMIN SERPL-MCNC: 3.92 G/DL (ref 3.75–5.01)
ALPHA1 GLOB SERPL ELPH-MCNC: 0.45 G/DL (ref 0.19–0.46)
ALPHA2 GLOB SERPL ELPH-MCNC: 0.74 G/DL (ref 0.48–1.05)
B-GLOBULIN SERPL ELPH-MCNC: 0.99 G/DL (ref 0.48–1.1)
GAMMA GLOB SERPL ELPH-MCNC: 3.4 G/DL (ref 0.62–1.51)
INTERPRETATION SERPL IFE-IMP: ABNORMAL
MONOCLON BAND OBS SERPL: ABNORMAL
PATHOLOGY STUDY: ABNORMAL
PROT SERPL-MCNC: 9.5 G/DL (ref 6–8.3)

## 2018-07-23 ENCOUNTER — TELEPHONE (OUTPATIENT)
Dept: HEMATOLOGY ONCOLOGY | Facility: MEDICAL CENTER | Age: 43
End: 2018-07-23

## 2018-07-23 NOTE — TELEPHONE ENCOUNTER
Patient calling stating Dr. Levy was going to be sending her to [facility] or to [facility], she is requesting a status update on the referral. I do not see a referral for either in her chart. Please advise.

## 2018-07-24 NOTE — TELEPHONE ENCOUNTER
Spoke to Annia Kuo, Medical Assistant ext#[phone] and she is currently working on the referral and has reached out to the patient.

## 2018-08-01 ENCOUNTER — HOSPITAL ENCOUNTER (OUTPATIENT)
Dept: RADIOLOGY | Facility: MEDICAL CENTER | Age: 43
End: 2018-08-01
Attending: NURSE PRACTITIONER
Payer: COMMERCIAL

## 2018-08-01 DIAGNOSIS — Z12.39 SCREENING FOR BREAST CANCER: ICD-10-CM

## 2018-08-01 PROCEDURE — 77067 SCR MAMMO BI INCL CAD: CPT

## 2018-09-05 ENCOUNTER — HOSPITAL ENCOUNTER (OUTPATIENT)
Dept: LAB | Facility: MEDICAL CENTER | Age: 43
End: 2018-09-05
Attending: INTERNAL MEDICINE
Payer: COMMERCIAL

## 2018-09-05 LAB
ALBUMIN SERPL BCP-MCNC: 4.2 G/DL (ref 3.2–4.9)
ALBUMIN/GLOB SERPL: 0.8 G/DL
ALP SERPL-CCNC: 104 U/L (ref 30–99)
ALT SERPL-CCNC: 19 U/L (ref 2–50)
ANION GAP SERPL CALC-SCNC: 5 MMOL/L (ref 0–11.9)
AST SERPL-CCNC: 26 U/L (ref 12–45)
BASOPHILS # BLD AUTO: 0.3 % (ref 0–1.8)
BASOPHILS # BLD: 0.01 K/UL (ref 0–0.12)
BILIRUB SERPL-MCNC: 0.4 MG/DL (ref 0.1–1.5)
BUN SERPL-MCNC: 8 MG/DL (ref 8–22)
CALCIUM SERPL-MCNC: 9.9 MG/DL (ref 8.5–10.5)
CHLORIDE SERPL-SCNC: 105 MMOL/L (ref 96–112)
CO2 SERPL-SCNC: 24 MMOL/L (ref 20–33)
CREAT SERPL-MCNC: 0.79 MG/DL (ref 0.5–1.4)
EOSINOPHIL # BLD AUTO: 0.01 K/UL (ref 0–0.51)
EOSINOPHIL NFR BLD: 0.3 % (ref 0–6.9)
GLOBULIN SER CALC-MCNC: 5.4 G/DL (ref 1.9–3.5)
GLUCOSE SERPL-MCNC: 88 MG/DL (ref 65–99)
HCT VFR BLD AUTO: 35 % (ref 37–47)
HGB BLD-MCNC: 11.7 G/DL (ref 12–16)
HGB RETIC QN AUTO: 35.9 PG/CELL (ref 29–35)
IMM GRANULOCYTES # BLD AUTO: 0.03 K/UL (ref 0–0.11)
IMM GRANULOCYTES NFR BLD AUTO: 0.9 % (ref 0–0.9)
IMM RETICS NFR: 16.4 % (ref 9.3–17.4)
LYMPHOCYTES # BLD AUTO: 1.91 K/UL (ref 1–4.8)
LYMPHOCYTES NFR BLD: 54.9 % (ref 22–41)
MCH RBC QN AUTO: 31.7 PG (ref 27–33)
MCHC RBC AUTO-ENTMCNC: 33.4 G/DL (ref 33.6–35)
MCV RBC AUTO: 94.9 FL (ref 81.4–97.8)
MONOCYTES # BLD AUTO: 0.19 K/UL (ref 0–0.85)
MONOCYTES NFR BLD AUTO: 5.5 % (ref 0–13.4)
NEUTROPHILS # BLD AUTO: 1.33 K/UL (ref 2–7.15)
NEUTROPHILS NFR BLD: 38.1 % (ref 44–72)
NRBC # BLD AUTO: 0 K/UL
NRBC BLD-RTO: 0 /100 WBC
PLATELET # BLD AUTO: 185 K/UL (ref 164–446)
PMV BLD AUTO: 11 FL (ref 9–12.9)
POTASSIUM SERPL-SCNC: 3.7 MMOL/L (ref 3.6–5.5)
PROT SERPL-MCNC: 9.6 G/DL (ref 6–8.2)
RBC # BLD AUTO: 3.69 M/UL (ref 4.2–5.4)
RETICS # AUTO: 0.02 M/UL (ref 0.04–0.06)
RETICS/RBC NFR: 1.5 % (ref 0.8–2.1)
SODIUM SERPL-SCNC: 134 MMOL/L (ref 135–145)
WBC # BLD AUTO: 3.5 K/UL (ref 4.8–10.8)

## 2018-09-05 PROCEDURE — 85270 CLOT FACTOR XI PTA: CPT

## 2018-09-05 PROCEDURE — 85025 COMPLETE CBC W/AUTO DIFF WBC: CPT

## 2018-09-05 PROCEDURE — 80053 COMPREHEN METABOLIC PANEL: CPT

## 2018-09-05 PROCEDURE — 85610 PROTHROMBIN TIME: CPT

## 2018-09-05 PROCEDURE — 36415 COLL VENOUS BLD VENIPUNCTURE: CPT

## 2018-09-05 PROCEDURE — 85613 RUSSELL VIPER VENOM DILUTED: CPT

## 2018-09-05 PROCEDURE — 85384 FIBRINOGEN ACTIVITY: CPT

## 2018-09-05 PROCEDURE — 85379 FIBRIN DEGRADATION QUANT: CPT

## 2018-09-05 PROCEDURE — 85046 RETICYTE/HGB CONCENTRATE: CPT

## 2018-09-05 PROCEDURE — 85250 CLOT FACTOR IX PTC/CHRSTMAS: CPT

## 2018-09-05 PROCEDURE — 85362 FIBRIN DEGRADATION PRODUCTS: CPT

## 2018-09-05 PROCEDURE — 85732 THROMBOPLASTIN TIME PARTIAL: CPT

## 2018-09-05 PROCEDURE — 85730 THROMBOPLASTIN TIME PARTIAL: CPT

## 2018-09-05 PROCEDURE — 85240 CLOT FACTOR VIII AHG 1 STAGE: CPT

## 2018-09-06 LAB
APTT 1H NP PPP: 49 SEC (ref 24.7–36)
APTT 1H P INC POOL PPP: 29.7 SEC (ref 24.7–36)
APTT 1H P INC PPP: 57 SEC (ref 24.7–36)
APTT HEX PL PPP: POSITIVE S
APTT IMM NP PPP: 48.2 SEC (ref 24.7–36)
APTT P HEP NEUT PPP: 48.6 SEC (ref 24.7–36)
APTT POOL PPP: 29.2 SEC (ref 24.7–36)
APTT PPP: 45.2 SEC (ref 24.7–36)
APTT PPP: 45.2 SEC (ref 24.7–36)
DEPRECATED D DIMER PPP IA-ACNC: 1796 NG/ML(D-DU)
FACT IX ACT/NOR PPP: 107 % (ref 75–125)
FACT VIII ACT/NOR PPP: 125 % (ref 45–145)
FIBRINOGEN PPP-MCNC: 433 MG/DL (ref 215–460)
INHIBITOR INDICATED 1863: NO
INHIBITOR INDICATED 1863: NO
INR PPP: 1.03 (ref 0.87–1.13)
LA PPP-IMP: PRESENT
PROTHROMBIN TIME: 13.2 SEC (ref 12–14.6)
SCREEN DRVVT: 43.4 SEC (ref 28–48)

## 2018-09-07 LAB
FSP PPP-MCNC: <5 UG/ML
MISCELLANEOUS LAB RESULT MISCLAB: NORMAL

## 2018-09-17 LAB
Lab: ABNORMAL
MISCELLANEOUS LAB RESULT MISCLAB: ABNORMAL

## 2018-09-27 ENCOUNTER — TELEPHONE (OUTPATIENT)
Dept: MEDICAL GROUP | Facility: PHYSICIAN GROUP | Age: 43
End: 2018-09-27

## 2018-09-27 RX ORDER — CYCLOBENZAPRINE HCL 10 MG
10 TABLET ORAL 2 TIMES DAILY PRN
Qty: 30 TAB | Refills: 1 | Status: SHIPPED | OUTPATIENT
Start: 2018-09-27 | End: 2018-10-25 | Stop reason: SDUPTHER

## 2018-09-27 NOTE — TELEPHONE ENCOUNTER
Patient would like to know the status of her Flexeril medication. Please advise. Refill encounter has been sent.

## 2018-10-19 ENCOUNTER — HOSPITAL ENCOUNTER (OUTPATIENT)
Dept: LAB | Facility: MEDICAL CENTER | Age: 43
End: 2018-10-19
Attending: NURSE PRACTITIONER
Payer: COMMERCIAL

## 2018-10-19 ENCOUNTER — OFFICE VISIT (OUTPATIENT)
Dept: MEDICAL GROUP | Facility: PHYSICIAN GROUP | Age: 43
End: 2018-10-19
Payer: COMMERCIAL

## 2018-10-19 VITALS
SYSTOLIC BLOOD PRESSURE: 114 MMHG | TEMPERATURE: 97.8 F | RESPIRATION RATE: 12 BRPM | BODY MASS INDEX: 28 KG/M2 | OXYGEN SATURATION: 98 % | WEIGHT: 164 LBS | HEIGHT: 64 IN | HEART RATE: 82 BPM | DIASTOLIC BLOOD PRESSURE: 72 MMHG

## 2018-10-19 DIAGNOSIS — Z23 NEED FOR VACCINATION FOR PNEUMOCOCCUS: ICD-10-CM

## 2018-10-19 DIAGNOSIS — R76.0 LUPUS ANTICOAGULANT POSITIVE: ICD-10-CM

## 2018-10-19 DIAGNOSIS — Z23 NEED FOR IMMUNIZATION AGAINST INFLUENZA: ICD-10-CM

## 2018-10-19 DIAGNOSIS — R23.3 EASY BRUISING: ICD-10-CM

## 2018-10-19 DIAGNOSIS — F43.10 PTSD (POST-TRAUMATIC STRESS DISORDER): ICD-10-CM

## 2018-10-19 LAB
CK SERPL-CCNC: 34 U/L (ref 0–154)
CRP SERPL HS-MCNC: 0.5 MG/DL (ref 0–0.75)
ERYTHROCYTE [SEDIMENTATION RATE] IN BLOOD BY WESTERGREN METHOD: 86 MM/HOUR (ref 0–20)
RHEUMATOID FACT SER IA-ACNC: 248 IU/ML (ref 0–14)
URATE SERPL-MCNC: 3.9 MG/DL (ref 1.9–8.2)

## 2018-10-19 PROCEDURE — 90472 IMMUNIZATION ADMIN EACH ADD: CPT | Performed by: NURSE PRACTITIONER

## 2018-10-19 PROCEDURE — 36415 COLL VENOUS BLD VENIPUNCTURE: CPT

## 2018-10-19 PROCEDURE — 86431 RHEUMATOID FACTOR QUANT: CPT

## 2018-10-19 PROCEDURE — 82550 ASSAY OF CK (CPK): CPT

## 2018-10-19 PROCEDURE — 90732 PPSV23 VACC 2 YRS+ SUBQ/IM: CPT | Performed by: NURSE PRACTITIONER

## 2018-10-19 PROCEDURE — 90471 IMMUNIZATION ADMIN: CPT | Performed by: NURSE PRACTITIONER

## 2018-10-19 PROCEDURE — 99214 OFFICE O/P EST MOD 30 MIN: CPT | Mod: 25 | Performed by: NURSE PRACTITIONER

## 2018-10-19 PROCEDURE — 86140 C-REACTIVE PROTEIN: CPT

## 2018-10-19 PROCEDURE — 85652 RBC SED RATE AUTOMATED: CPT

## 2018-10-19 PROCEDURE — 90686 IIV4 VACC NO PRSV 0.5 ML IM: CPT | Performed by: NURSE PRACTITIONER

## 2018-10-19 PROCEDURE — 84550 ASSAY OF BLOOD/URIC ACID: CPT

## 2018-10-19 PROCEDURE — 86200 CCP ANTIBODY: CPT

## 2018-10-19 RX ORDER — ARIPIPRAZOLE 5 MG/1
5 TABLET ORAL DAILY
Qty: 30 TAB | Refills: 0 | Status: SHIPPED | OUTPATIENT
Start: 2018-10-19 | End: 2018-10-30 | Stop reason: SDUPTHER

## 2018-10-19 ASSESSMENT — PAIN SCALES - GENERAL: PAINLEVEL: NO PAIN

## 2018-10-19 NOTE — PROGRESS NOTES
CC:  Depression and easy bruising    HISTORY OF THE PRESENT ILLNESS: Patient is a 43 y.o. female. This pleasant patient is here today for evaluation and management of following health problems.    Health Maintenance: Completed      PTSD (post-traumatic stress disorder)  Chronic health problem.  Usually well controlled with Lamictal 100 mg daily.  Patient reports having suicidal ideation intermittently in the past.  She reports current bout of suicidal ideation is lasting longer than usual.  When SI lasts longer than a couple weeks, she usually needs to add Abilify. Prescription sent in  She is asking for a prescription for Abilify today.  Prescription sent to pharmacy.  Patient contracts for safety today.  She has friends and family she can reach out to and knows to go to the ER if needed.  Has appointment with psychiatry on 10/30/18 to establish care.      Lupus anticoagulant positive  During work-up for easy bruising, lab results showed positive lupus anticoagulant.  Patient reports chronic joint pain in back, knees, and shoulders.  Not sure joint pain is related to fibromyalgia.  She reports rash on face when she does not apply sunscreen and skin is photosensitive.  Patient wondering if this could be a cause of her easy bruising.  Will get labs and will refer her to rheumatology for further evaluation and treatment.    Easy bruising  Chronic problem, and radiology.  Initial episode of easy bruising was 4 months ago.  Patient has seen hematology, Dr. Levy, and hematology at [facility].  [facility] note in media tab.  Etiology of easy bruising is undetermined.  Incidentally found was lupus anticoagulant and associated PTT.  Patient did not have bruising at time of consultation with hematology at [facility].   She was advised to follow-up with local provider when bruising reoccurred for further bleeding diathesis.  Patient reports she has not had any more extreme bruising in the last 3 months.  She does report small  bruises on lower extremities with large knots that are very tender, occur intermittently, does not currently have bruising or knots.  Patient will follow up with hematology, Dr. Jimenez, when  bruising recurs.      Allergies: Codeine    Current Outpatient Prescriptions Ordered in [facility]   Medication Sig Dispense Refill   • aripiprazole (ABILIFY) 5 MG tablet Take 1 Tab by mouth every day. 30 Tab 0   • cyclobenzaprine (FLEXERIL) 10 MG Tab Take 1 Tab by mouth 2 times a day as needed. 30 Tab 1   • DiphenhydrAMINE HCl (BENADRYL ALLERGY PO) Take  by mouth.     • gabapentin (NEURONTIN) 600 MG tablet Take 1 Tab by mouth 3 times a day. 90 Tab 3   • lamoTRIgine (LAMICTAL) 100 MG Tab Take 1 Tab by mouth every day. 90 Tab 1   • traZODone (DESYREL) 100 MG Tab Take one tab at bedtime as needed for sleep. 90 Tab 3   • Fluticasone Propionate (FLONASE NA) Spray  in nose.     • LORATADINE PO Take  by mouth.     • clotrimazole (LOTRIMIN) 1 % Cream Apply to affected area twice a day. 2 Tube 1     No current Epic-ordered facility-administered medications on file.        Past Medical History:   Diagnosis Date   • Depression    • Fibromyalgia        Past Surgical History:   Procedure Laterality Date   • DENTAL EXTRACTION(S)  1996   • KNEE ARTHROSCOPY     • PRIMARY C SECTION     • TUBAL LIGATION         Social History   Substance Use Topics   • Smoking status: Current Every Day Smoker     Packs/day: 0.50     Years: 21.00   • Smokeless tobacco: Never Used   • Alcohol use No      Comment: rare       Family History   Problem Relation Age of Onset   • Cancer Mother         uterine   • Other Mother         celiac   • Depression Father    • Lung Disease Father    • Bladder cancer Father    • Other Father         drug addict   • Heart Disease Sister    • Other Sister         mental retardation   • Bipolar disorder Brother    • Other Brother         addiction   • Breast Cancer Maternal Grandmother    • Other Maternal Grandmother         stroke   •  "Prostate cancer Maternal Grandfather    • Heart Disease Paternal Grandmother    • Heart Disease Paternal Grandfather    • Other Brother         paranoid schizophrenia, drug addiction       ROS:     - Constitutional: Negative for fever, chills, unexpected weight change.  Positive for chronic fatigue.    - HEENT: Negative for headaches, vision changes, hearing changes, ear pain, rhinorrhea, sinus congestion, and sore throat.      - Respiratory: Negative for cough, dyspnea, and wheezing.      - Cardiovascular: Negative for chest pain, palpitations,  and bilateral lower extremity edema.     - Gastrointestinal: Negative for  nausea, vomiting, abdominal pain,  melena.     - Genitourinary: Negative for dysuria.    - Musculoskeletal: As in HPI.     - Skin: As in HPI.     - Neurological: Negative for dizziness, tingling.     - Endo/Heme/Allergies: As in HPI    - Psychiatric/Behavioral: As in HPI.            Exam: Blood pressure 114/72, pulse 82, temperature 36.6 °C (97.8 °F), temperature source Temporal, resp. rate 12, height 1.636 m (5' 4.4\"), weight 74.4 kg (164 lb), last menstrual period 09/30/2018, SpO2 98 %, not currently breastfeeding. Body mass index is 27.8 kg/m².    General: Alert, pleasant, well nourished, well developed female in NAD  HEENT: Normocephalic. Eyes conjunctiva clear lids without ptosis, pupils equal and reactive to light, ears normal shape and contour, canals are clear bilaterally, tympanic membranes are pearly gray with good light reflex, nasal mucosa without erythema and drainage, oropharynx is without erythema, edema or exudates.   Neck: Supple without bruit. Thyroid is not enlarged.  Pulmonary: Clear to ausculation.  Normal effort. No rales, ronchi, or wheezing.  Cardiovascular: Normal rate and rhythm without murmur. Carotid and radial pulses are intact and equal bilaterally.  No lower extremity edema.  Abdomen: Soft, nontender, nondistended. Normal bowel sounds. Liver and spleen are not " palpable  Neurologic: Grossly nonfocal  Lymph: No cervical or supraclavicular lymph nodes are palpable  Skin: Warm and dry.  No obvious lesions. Facial cheeks and nose with sunburnt appearance.  No bruising.  Musculoskeletal: Normal gait.  Lower extremities: No erythema or edema, no bruising or masses.  Psych: Normal mood and affect. Alert and oriented. Judgment and insight is normal.    Please note that this dictation was created using voice recognition software. I have made every reasonable attempt to correct obvious errors, but I expect that there are errors of grammar and possibly content that I did not discover before finalizing the note.      Assessment/Plan  1. Lupus anticoagulant positive    - RHEUMATOID ARTHRITIS FACTOR; Future  - WESTERGREN SED RATE; Future  - CCP ANTIBODY; Future  - CRP QUANTITIVE (NON-CARDIAC); Future  - CREATINE KINASE; Future  - URIC ACID; Future  - REFERRAL TO RHEUMATOLOGY    2. PTSD (post-traumatic stress disorder)    - aripiprazole (ABILIFY) 5 MG tablet; Take 1 Tab by mouth every day.  Dispense: 30 Tab; Refill: 0    3. Need for immunization against influenza  Given  - Flu Quad Inj >3 Year Pre-Filled PF    4. Need for vaccination for pneumococcus  Given  - Pneumococal Polysaccharide Vaccine 23-Valent =>1yo SQ/IM    5. Easy bruising      Patient will return to clinic in 6 months or sooner if needed.

## 2018-10-20 PROBLEM — R76.0 LUPUS ANTICOAGULANT POSITIVE: Status: ACTIVE | Noted: 2018-10-20

## 2018-10-20 NOTE — ASSESSMENT & PLAN NOTE
Chronic health problem.  Usually well controlled with Lamictal 100 mg daily.  Patient reports having suicidal ideation intermittently in the past.  She reports current bout of suicidal ideation is lasting longer than usual.  When SI lasts longer than a couple weeks, she usually needs to add Abilify. Prescription sent in  She is asking for a prescription for Abilify today.  Prescription sent to pharmacy.  Patient contracts for safety today.  She has friends and family she can reach out to and knows to go to the ER if needed.  Has appointment with psychiatry on 10/30/18 to establish care.

## 2018-10-20 NOTE — ASSESSMENT & PLAN NOTE
During work-up for easy bruising, lab results showed positive lupus anticoagulant.  Patient reports chronic joint pain in back, knees, and shoulders.  Not sure joint pain is related to fibromyalgia.  She reports rash on face when she does not apply sunscreen and skin is photosensitive.  Patient wondering if this could be a cause of her easy bruising.  Will get labs and will refer her to rheumatology for further evaluation and treatment.

## 2018-10-20 NOTE — ASSESSMENT & PLAN NOTE
Chronic problem, and radiology.  Initial episode of easy bruising was 4 months ago.  Patient has seen hematology, Dr. Levy, and hematology at [facility].  [facility] note in media tab.  Etiology of easy bruising is undetermined.  Incidentally found was lupus anticoagulant and associated PTT.  Patient did not have bruising at time of consultation with hematology at [facility].   She was advised to follow-up with local provider when bruising reoccurred for further bleeding diathesis.  Patient reports she has not had any more extreme bruising in the last 3 months.  She does report small bruises on lower extremities with large knots that are very tender, occur intermittently, does not currently have bruising or knots.  Patient will follow up with hematology, Dr. Jimenez, when  bruising recurs.

## 2018-10-21 LAB — CCP IGG SERPL-ACNC: 4 UNITS (ref 0–19)

## 2018-10-29 RX ORDER — CYCLOBENZAPRINE HCL 10 MG
10 TABLET ORAL 2 TIMES DAILY PRN
Qty: 30 TAB | Refills: 0 | Status: SHIPPED | OUTPATIENT
Start: 2018-10-29 | End: 2018-10-30

## 2018-10-30 ENCOUNTER — OFFICE VISIT (OUTPATIENT)
Dept: BEHAVIORAL HEALTH | Facility: CLINIC | Age: 43
End: 2018-10-30
Payer: COMMERCIAL

## 2018-10-30 ENCOUNTER — DOCUMENTATION (OUTPATIENT)
Dept: BEHAVIORAL HEALTH | Facility: CLINIC | Age: 43
End: 2018-10-30

## 2018-10-30 VITALS
HEART RATE: 111 BPM | HEIGHT: 66 IN | DIASTOLIC BLOOD PRESSURE: 95 MMHG | SYSTOLIC BLOOD PRESSURE: 113 MMHG | WEIGHT: 158 LBS | BODY MASS INDEX: 25.39 KG/M2

## 2018-10-30 DIAGNOSIS — Z79.899 LONG TERM USE OF DRUG: ICD-10-CM

## 2018-10-30 DIAGNOSIS — F43.10 PTSD (POST-TRAUMATIC STRESS DISORDER): ICD-10-CM

## 2018-10-30 DIAGNOSIS — F33.41 MAJOR DEPRESSIVE DISORDER, RECURRENT, IN PARTIAL REMISSION (HCC): ICD-10-CM

## 2018-10-30 PROBLEM — F41.9 ANXIETY DISORDER: Status: ACTIVE | Noted: 2018-10-30

## 2018-10-30 PROCEDURE — 99204 OFFICE O/P NEW MOD 45 MIN: CPT | Mod: GC | Performed by: PSYCHIATRY & NEUROLOGY

## 2018-10-30 RX ORDER — ARIPIPRAZOLE 2 MG/1
2 TABLET ORAL DAILY
Qty: 30 TAB | Refills: 2 | Status: SHIPPED | OUTPATIENT
Start: 2018-10-30 | End: 2018-12-10 | Stop reason: SDUPTHER

## 2018-10-30 RX ORDER — TRAZODONE HYDROCHLORIDE 100 MG/1
100 TABLET ORAL NIGHTLY PRN
COMMUNITY
End: 2018-10-30 | Stop reason: SDUPTHER

## 2018-10-30 RX ORDER — LAMOTRIGINE 100 MG/1
100 TABLET ORAL DAILY
Qty: 90 TAB | Refills: 1 | Status: SHIPPED | OUTPATIENT
Start: 2018-10-30 | End: 2019-01-28 | Stop reason: SDUPTHER

## 2018-10-30 RX ORDER — CYCLOBENZAPRINE HCL 10 MG
10 TABLET ORAL
COMMUNITY
End: 2019-02-15 | Stop reason: SDUPTHER

## 2018-10-30 RX ORDER — LORAZEPAM 1 MG/1
1 TABLET ORAL
COMMUNITY
Start: 2018-10-28 | End: 2019-01-28 | Stop reason: SDUPTHER

## 2018-10-30 RX ORDER — TRAZODONE HYDROCHLORIDE 100 MG/1
100 TABLET ORAL NIGHTLY PRN
Qty: 30 TAB | Refills: 2 | Status: SHIPPED | OUTPATIENT
Start: 2018-10-30 | End: 2019-01-28 | Stop reason: SDUPTHER

## 2018-10-30 ASSESSMENT — ABNORMAL INVOLUNTARY MOVEMENT SCALE (AIMS)
LOWER_BODY_EXTREMITIES: NONE, NORMAL
FACIAL_EXPRESSION_MUSCLES: NONE, NORMAL
AIMS_PATIENT_AWARENESS: NO AWARENESS
LIPS_PARIETAL: NONE, NORMAL
NECK_SHOULDER_HIPS: NONE, NORMAL
AIMS_SEVERITY: 0
CURRENT_PROBLEMS_TEETH_DENTURES: NO
JAW: NONE, NORMAL
TONGUE: NONE, NORMAL
UPPER_BODY_EXTREMITIES: NONE, NORMAL
PATIENT_WEARS_DENTURES: NO
AIMS_PATIENT_INCAPACITATION: NONE, NORMAL
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
## 2018-11-07 ENCOUNTER — PATIENT MESSAGE (OUTPATIENT)
Dept: MEDICAL GROUP | Facility: PHYSICIAN GROUP | Age: 43
End: 2018-11-07

## 2018-11-16 ENCOUNTER — TELEPHONE (OUTPATIENT)
Dept: HEMATOLOGY ONCOLOGY | Facility: MEDICAL CENTER | Age: 43
End: 2018-11-16

## 2018-11-16 NOTE — TELEPHONE ENCOUNTER
----- Message from Juanita Gmienez sent at 11/16/2018  8:06 AM PST -----  Alamgir previously seen patient and sent to tertiary center for 2nd opinion. Per Dr. Velázquez, patient needs to follow up, can schedule with locum.

## 2018-11-16 NOTE — TELEPHONE ENCOUNTER
Left message for patient to return call to schedule follow up as transfer from Dr. Levy. Patient can be scheduled with Dr. Reid for follow up.

## 2018-12-03 ENCOUNTER — OFFICE VISIT (OUTPATIENT)
Dept: HEMATOLOGY ONCOLOGY | Facility: MEDICAL CENTER | Age: 43
End: 2018-12-03
Payer: COMMERCIAL

## 2018-12-03 VITALS
HEIGHT: 65 IN | SYSTOLIC BLOOD PRESSURE: 122 MMHG | OXYGEN SATURATION: 100 % | TEMPERATURE: 98 F | WEIGHT: 166.45 LBS | BODY MASS INDEX: 27.73 KG/M2 | RESPIRATION RATE: 20 BRPM | HEART RATE: 110 BPM | DIASTOLIC BLOOD PRESSURE: 80 MMHG

## 2018-12-03 DIAGNOSIS — D64.9 ANEMIA, UNSPECIFIED TYPE: ICD-10-CM

## 2018-12-03 DIAGNOSIS — R23.3 EASY BRUISING: ICD-10-CM

## 2018-12-03 DIAGNOSIS — L98.9 SKIN LESIONS: ICD-10-CM

## 2018-12-03 DIAGNOSIS — R76.0 LUPUS ANTICOAGULANT POSITIVE: ICD-10-CM

## 2018-12-03 PROCEDURE — 99214 OFFICE O/P EST MOD 30 MIN: CPT | Performed by: INTERNAL MEDICINE

## 2018-12-03 ASSESSMENT — PAIN SCALES - GENERAL: PAINLEVEL: 6=MODERATE PAIN

## 2018-12-03 NOTE — PROGRESS NOTES
Date of visit: 12/3/2018  7:56 AM    Chief Complaint:   1. Easy bruising.  2. Skin lesions.  3. Positive lupus anticoagulant testing.  4. Anemia.    Identification/Prior relevant history:   Augusta Real  is a 43 y.o.woman with history of fibromyalgia and depression/PTSD, who initially established care with us in June of 2018 for evaluation of easy bruising over her lower legs since Apri of 2018. She reports no bleeding or thrombotic events or pregnancy complications in the past. She reports bruises on the dorsal aspect of her right leg which come and go and measure over 5 cm in diameter. She was seen in Urgent Care in June 2018 for this with labs indicating prolonged aPTT. Factor VIII, XI, and XII activity levels were normal at our clinic. She was noted to have an incidental lupus anticoagulant presence.     Interim history  Patient was seen at [facility] for evaluation of bruising since last visit. She did not have bruising at the time of the visit and did not share pictures of bruises at that time. They did not initiate additional evaluation since she did not have evident bruising and her bleeding history was unremarkable and insurance was not covering any testing at [facility]. She does not have any bruising today. However, she points to small lesions on her legs which she felt was bruising as well. She denies presence of these lesions elsewhere. She is also being referred to rheumatology as she was told she may have an autoimmune condition at [facility].    Past Medical History:      Past Medical History:   Diagnosis Date   • Depression    • Fibromyalgia        Past surgical history:       Past Surgical History:   Procedure Laterality Date   • DENTAL EXTRACTION(S)  1996   • KNEE ARTHROSCOPY     • PRIMARY C SECTION     • TUBAL LIGATION         Allergies:         Codeine    Medications:         Current Outpatient Prescriptions   Medication Sig Dispense Refill   • LORazepam (ATIVAN) 1 MG Tab Take 1 Tab by  mouth 1 time daily as needed for Anxiety.     • cyclobenzaprine (FLEXERIL) 10 MG Tab Take 10 mg by mouth 1 time daily as needed for Muscle Spasms.     • ARIPiprazole (ABILIFY) 2 MG tablet Take 1 Tab by mouth every day. 30 Tab 2   • traZODone (DESYREL) 100 MG Tab Take 1 Tab by mouth at bedtime as needed. 30 Tab 2   • lamoTRIgine (LAMICTAL) 100 MG Tab Take 1 Tab by mouth every day. 90 Tab 1   • DiphenhydrAMINE HCl (BENADRYL ALLERGY PO) Take  by mouth.     • gabapentin (NEURONTIN) 600 MG tablet Take 1 Tab by mouth 3 times a day. 90 Tab 3   • clotrimazole (LOTRIMIN) 1 % Cream Apply to affected area twice a day. 2 Tube 1   • Fluticasone Propionate (FLONASE NA) Spray  in nose.     • LORATADINE PO Take  by mouth.       No current facility-administered medications for this visit.        Social History:     Social History     Social History   • Marital status:      Spouse name: N/A   • Number of children: N/A   • Years of education: N/A     Occupational History   • [employer] Other     Social History Main Topics   • Smoking status: Current Every Day Smoker     Packs/day: 0.50     Years: 21.00   • Smokeless tobacco: Never Used   • Alcohol use No      Comment: rare   • Drug use: No      Comment: topical lotion with THC for fibromyalgia   • Sexual activity: Yes     Partners: Male     Birth control/ protection: Surgical     Other Topics Concern   • Not on file     Social History Narrative   • No narrative on file       Family History:      Family History   Problem Relation Age of Onset   • Cancer Mother         uterine   • Other Mother         celiac   • Depression Father    • Lung Disease Father    • Bladder cancer Father    • Other Father         drug addict   • Heart Disease Sister    • Other Sister         mental retardation   • Bipolar disorder Brother    • Other Brother         addiction   • Breast Cancer Maternal Grandmother    • Other Maternal Grandmother         stroke   • Prostate cancer Maternal Grandfather    • Heart  "Disease Paternal Grandmother    • Heart Disease Paternal Grandfather    • Other Brother         paranoid schizophrenia, drug addiction       Review of Systems:  Constitutional: Negative for fever, chills, weight loss and malaise/fatigue.    HEENT: No new auditory or visual complaints. No sore throat and neck pain.     Respiratory: Negative for cough, sputum production, shortness of breath and wheezing.    Cardiovascular: Negative for chest pain, palpitations, orthopnea and leg swelling.    Gastrointestinal: Negative for heartburn, nausea, vomiting and abdominal pain.    Genitourinary: Negative for dysuria, hematuria    Musculoskeletal: No new arthralgias or myalgias   Skin: Skin issues as above.  Neurological: Negative for focal weakness and headaches.    Endo/Heme/Allergies: No abnormal bleed/bruise.    Psychiatric/Behavioral: No new depression/anxiety.    Physical Exam:  /80 (BP Location: Right arm, Patient Position: Sitting, BP Cuff Size: Adult)   Pulse (!) 110   Temp 36.7 °C (98 °F) (Temporal)   Resp 20   Ht 1.651 m (5' 5\")   Wt 75.5 kg (166 lb 7.2 oz)   SpO2 100%   BMI 27.70 kg/m²   General: No acute distress.  Eyes: Normal conjuctiva and lids. No icterus.  HEENT: Oropharynx clear.   Neck: Supple with no palpable masses.  Lymph nodes: No palpable cervical, supraclavicular or axillary lymphadenopathy.    CVS: regular rate and rhythm, no rubs or gallops.   RESP: Clear to auscultation bilaterally with normal respiratory effort.   ABD: Soft, non tender, non distended, normal bowel sounds, no palpable organomegaly  EXT: No edema or cyanosis.  CNS: Alert and oriented x3, No focal deficits.  Skin: Small 3-5 mm red non blanching lesions on both lower legs. No bruising appreciated today.      Labs:   No visits with results within 1 Week(s) from this visit.   Latest known visit with results is:   Hospital Outpatient Visit on 10/19/2018   Component Date Value Ref Range Status   • Rheumatoid Factor -Neph- " 10/19/2018 248* 0 - 14 IU/mL Final    Results obtained by dilution.   • Sed Rate Evertonren 10/19/2018 86* 0 - 20 mm/hour Final   • Ccp Antibodies 10/19/2018 4  0 - 19 Units Final    Comment: INTERPRETIVE INFORMATION: Cyclic Citrullinated Peptide Antibody,  IgG  19 Units or less ................... Negative  20-39 Units ........................ Weak Positive  40-59 Units ........................ Moderate Positive  60 Units or greater ................ Strong Positive  Anti-cyclic citrullinated peptide (anti-CCP), IgG antibodies are  present in about 69-83 percent of patients with rheumatoid  arthritis (RA) and have specificities of 93-95 percent. These  autoantibodies may be present in the preclinical phase of disease,  are associated with future RA development, and may predict  radiographic joint destruction. Patients with weak positive  results should be monitored and testing repeated.  Performed by [facility],  [address] [place] [id number] [phone]  [email or web address], Narciso Rosenthal MD - Lab. Director     • Stat C-Reactive Protein 10/19/2018 0.50  0.00 - 0.75 mg/dL Final   • CPK Total 10/19/2018 34  0 - 154 U/L Final   • Uric Acid 10/19/2018 3.9  1.9 - 8.2 mg/dL Final           Assessment and Plan:  Augusta Real  is a 43 y.o. year old woman with history of fibromyalgia and depression/PTSD who is followed for complaint of recurrent lower extremity bruising in the absence of trauma since April 2018. She was found to have slightly prolonged PTT however subsequent evaluation did not find a clotting factor deficiency. She was found to have a lupus anticoagulant presence. Based on her history and exam there continues to be no significant bleeding diathesis. I encouraged the patient to take pictures of her bruises when they do occur for and return to clinic should this happen again. I will order platelet function analysis today for additional work up. She will also have her anemia evaluated with iron  studies and b12 levels. She is anticipating a rheumatology evaluation for possible autoimmune condition per Fritz recommendations per patient. She will also be referred to dermatology for evaluation of lower extremity skin lesions which do not look like bruises on today's exam.    She agreed and verbalized  agreement and understanding with the current plan.  I answered all questions and concerns at this time.      SIGNATURES:  Anup Reid    CC:  Digna Chase, A.P.R.N.  No ref. provider found

## 2018-12-10 DIAGNOSIS — F43.10 PTSD (POST-TRAUMATIC STRESS DISORDER): ICD-10-CM

## 2018-12-10 RX ORDER — ARIPIPRAZOLE 5 MG/1
5 TABLET ORAL DAILY
Qty: 30 TAB | Refills: 1 | Status: SHIPPED | OUTPATIENT
Start: 2018-12-10 | End: 2019-01-28 | Stop reason: SDUPTHER

## 2018-12-10 NOTE — PROGRESS NOTES
Pt reports that she has had a return of SI over the past week, but denies any active plans or intent.  Just reports her depression has worsened.  Will increase Abilify back to 5 mg po daily and asked pt to report to Ed or call 911 if SI worsens or if she has any active plans and to contact office again if things do not improve.      Plan:  - Increase Abilify back to 5 mg po daily.

## 2018-12-17 ENCOUNTER — PATIENT MESSAGE (OUTPATIENT)
Dept: MEDICAL GROUP | Facility: PHYSICIAN GROUP | Age: 43
End: 2018-12-17

## 2018-12-17 DIAGNOSIS — R76.11 POSITIVE TB TEST: ICD-10-CM

## 2018-12-18 RX ORDER — LORAZEPAM 1 MG/1
TABLET ORAL
Refills: 2 | OUTPATIENT
Start: 2018-12-18

## 2018-12-18 NOTE — TELEPHONE ENCOUNTER
Looks like patient is getting refills from psychiatry now.  Please advise her to contact psychiatry for refill of Ativan.

## 2018-12-19 NOTE — TELEPHONE ENCOUNTER
Phone Number Called: [phone] (home)       Message: patient states that [facility] made a mistake please disregard.    Left Message for patient to call back: no

## 2018-12-28 DIAGNOSIS — M79.7 FIBROMYALGIA: ICD-10-CM

## 2018-12-31 ENCOUNTER — TELEPHONE (OUTPATIENT)
Dept: MEDICAL GROUP | Facility: PHYSICIAN GROUP | Age: 43
End: 2018-12-31

## 2018-12-31 ENCOUNTER — OFFICE VISIT (OUTPATIENT)
Dept: URGENT CARE | Facility: PHYSICIAN GROUP | Age: 43
End: 2018-12-31
Payer: COMMERCIAL

## 2018-12-31 VITALS
RESPIRATION RATE: 16 BRPM | OXYGEN SATURATION: 97 % | DIASTOLIC BLOOD PRESSURE: 78 MMHG | SYSTOLIC BLOOD PRESSURE: 120 MMHG | HEART RATE: 113 BPM | TEMPERATURE: 98 F

## 2018-12-31 DIAGNOSIS — M79.7 FIBROMYALGIA: ICD-10-CM

## 2018-12-31 PROCEDURE — 99213 OFFICE O/P EST LOW 20 MIN: CPT | Performed by: NURSE PRACTITIONER

## 2018-12-31 RX ORDER — GABAPENTIN 600 MG/1
TABLET ORAL
Status: CANCELLED | OUTPATIENT
Start: 2018-12-31

## 2018-12-31 RX ORDER — GABAPENTIN 600 MG/1
600 TABLET ORAL 3 TIMES DAILY
Qty: 90 TAB | Refills: 0 | Status: SHIPPED | OUTPATIENT
Start: 2018-12-31 | End: 2019-01-02 | Stop reason: SDUPTHER

## 2018-12-31 ASSESSMENT — ENCOUNTER SYMPTOMS
FEVER: 0
PALPITATIONS: 0
WHEEZING: 0
CHILLS: 0
SHORTNESS OF BREATH: 0

## 2018-12-31 NOTE — TELEPHONE ENCOUNTER
Was the patient seen in the last year in this department? Yes    Does patient have an active prescription for medications requested? No     Received Request Via: Pharmacy    Pt met protocol?: Yes     Last OV 10/2018

## 2018-12-31 NOTE — TELEPHONE ENCOUNTER
A 90-dayprescription was sent to [facility] pharmacy in July 2018 with 3 refills.  Will you please check with pharmacy to make sure there are refills remaining and then let patient know. Thanks

## 2019-01-01 NOTE — PROGRESS NOTES
Subjective:   Augusta Real is a 43 y.o. female who presents for Medication Refill (gabapentin-wants paper copy)        HPI   Patient requests refill of gabapentin. She has tried reaching her PCP and stated she did not receive refills. PCP notes states that prescription was sent in July with 3 refills.        Review of Systems   Constitutional: Negative for chills, fever and malaise/fatigue.   Respiratory: Negative for shortness of breath and wheezing.    Cardiovascular: Negative for chest pain and palpitations.   Musculoskeletal:        Hx of fibromyalgia     Allergies   Allergen Reactions   • Codeine       Objective:   /78   Pulse (!) 113   Temp 36.7 °C (98 °F)   Resp 16   SpO2 97%   Physical Exam   Constitutional: She appears well-developed and well-nourished. No distress.   HENT:   Head: Normocephalic.   Skin: She is not diaphoretic.   Psychiatric: She has a normal mood and affect. Her speech is normal and behavior is normal. Judgment and thought content normal. She is not actively hallucinating. Cognition and memory are normal. She is attentive.   Vitals reviewed.        Assessment/Plan:   Assessment    1. Fibromyalgia  - gabapentin (NEURONTIN) 600 MG tablet; Take 1 Tab by mouth 3 times a day.  Dispense: 90 Tab; Refill: 0    I called [facility] personally and spoke to pharmacist, where he states there are no current medication refills for gabapentin.    Refilled gabapentin for 30 days. She is to contact PCP for any additional refills.    Differential diagnosis, natural history, supportive care, and indications for immediate follow-up discussed.

## 2019-01-02 DIAGNOSIS — M79.7 FIBROMYALGIA: ICD-10-CM

## 2019-01-03 RX ORDER — GABAPENTIN 600 MG/1
600 TABLET ORAL 3 TIMES DAILY
Qty: 270 TAB | Refills: 1 | Status: SHIPPED | OUTPATIENT
Start: 2019-01-03 | End: 2019-08-06 | Stop reason: SDUPTHER

## 2019-01-28 ENCOUNTER — HOSPITAL ENCOUNTER (OUTPATIENT)
Dept: LAB | Facility: MEDICAL CENTER | Age: 44
End: 2019-01-28
Attending: INTERNAL MEDICINE
Payer: COMMERCIAL

## 2019-01-28 ENCOUNTER — HOSPITAL ENCOUNTER (OUTPATIENT)
Dept: RADIOLOGY | Facility: MEDICAL CENTER | Age: 44
End: 2019-01-28
Attending: INTERNAL MEDICINE
Payer: COMMERCIAL

## 2019-01-28 ENCOUNTER — PATIENT MESSAGE (OUTPATIENT)
Dept: MEDICAL GROUP | Facility: PHYSICIAN GROUP | Age: 44
End: 2019-01-28

## 2019-01-28 ENCOUNTER — OFFICE VISIT (OUTPATIENT)
Dept: BEHAVIORAL HEALTH | Facility: CLINIC | Age: 44
End: 2019-01-28
Payer: COMMERCIAL

## 2019-01-28 VITALS
BODY MASS INDEX: 28.66 KG/M2 | SYSTOLIC BLOOD PRESSURE: 132 MMHG | WEIGHT: 172 LBS | HEART RATE: 115 BPM | DIASTOLIC BLOOD PRESSURE: 92 MMHG | HEIGHT: 65 IN

## 2019-01-28 DIAGNOSIS — R76.0 LUPUS ANTICOAGULANT POSITIVE: ICD-10-CM

## 2019-01-28 DIAGNOSIS — M25.50 POLYARTHRALGIA: ICD-10-CM

## 2019-01-28 DIAGNOSIS — M05.80 POLYARTHRITIS WITH POSITIVE RHEUMATOID FACTOR (HCC): ICD-10-CM

## 2019-01-28 DIAGNOSIS — D64.9 ANEMIA, UNSPECIFIED TYPE: ICD-10-CM

## 2019-01-28 DIAGNOSIS — R21 RASH AND NONSPECIFIC SKIN ERUPTION: ICD-10-CM

## 2019-01-28 DIAGNOSIS — F33.41 MAJOR DEPRESSIVE DISORDER, RECURRENT, IN PARTIAL REMISSION (HCC): ICD-10-CM

## 2019-01-28 DIAGNOSIS — R23.3 EASY BRUISING: ICD-10-CM

## 2019-01-28 DIAGNOSIS — F43.10 PTSD (POST-TRAUMATIC STRESS DISORDER): ICD-10-CM

## 2019-01-28 DIAGNOSIS — R23.1 LIVEDO RETICULARIS: ICD-10-CM

## 2019-01-28 LAB
ALBUMIN SERPL BCP-MCNC: 3.8 G/DL (ref 3.2–4.9)
ALBUMIN/GLOB SERPL: 0.6 G/DL
ALP SERPL-CCNC: 129 U/L (ref 30–99)
ALT SERPL-CCNC: 32 U/L (ref 2–50)
ANION GAP SERPL CALC-SCNC: 3 MMOL/L (ref 0–11.9)
APPEARANCE UR: CLEAR
APTT 1H NP PPP: 45.7 SEC (ref 24.7–36)
APTT 1H P INC POOL PPP: 28.2 SEC (ref 24.7–36)
APTT 1H P INC PPP: 48.6 SEC (ref 24.7–36)
APTT HEX PL PPP: POSITIVE S
APTT IMM NP PPP: 45.2 SEC (ref 24.7–36)
APTT POOL PPP: 30.5 SEC (ref 24.7–36)
APTT PPP: 45 SEC (ref 24.7–36)
APTT PPP: 45 SEC (ref 24.7–36)
AST SERPL-CCNC: 38 U/L (ref 12–45)
BACTERIA #/AREA URNS HPF: ABNORMAL /HPF
BASOPHILS # BLD AUTO: 0.3 % (ref 0–1.8)
BASOPHILS # BLD AUTO: 0.5 % (ref 0–1.8)
BASOPHILS # BLD: 0.02 K/UL (ref 0–0.12)
BASOPHILS # BLD: 0.03 K/UL (ref 0–0.12)
BILIRUB SERPL-MCNC: 0.3 MG/DL (ref 0.1–1.5)
BILIRUB UR QL STRIP.AUTO: NEGATIVE
BUN SERPL-MCNC: 7 MG/DL (ref 8–22)
C3 SERPL-MCNC: 98 MG/DL (ref 87–200)
C4 SERPL-MCNC: 13 MG/DL (ref 19–52)
CALCIUM SERPL-MCNC: 9.1 MG/DL (ref 8.5–10.5)
CHLORIDE SERPL-SCNC: 105 MMOL/L (ref 96–112)
CO2 SERPL-SCNC: 26 MMOL/L (ref 20–33)
COLOR UR: YELLOW
CREAT SERPL-MCNC: 0.77 MG/DL (ref 0.5–1.4)
CRP SERPL HS-MCNC: 0.38 MG/DL (ref 0–0.75)
EOSINOPHIL # BLD AUTO: 0.01 K/UL (ref 0–0.51)
EOSINOPHIL # BLD AUTO: 0.02 K/UL (ref 0–0.51)
EOSINOPHIL NFR BLD: 0.2 % (ref 0–6.9)
EOSINOPHIL NFR BLD: 0.3 % (ref 0–6.9)
EPI CELLS #/AREA URNS HPF: NEGATIVE /HPF
ERYTHROCYTE [DISTWIDTH] IN BLOOD BY AUTOMATED COUNT: 54.2 FL (ref 35.9–50)
ERYTHROCYTE [DISTWIDTH] IN BLOOD BY AUTOMATED COUNT: 54.5 FL (ref 35.9–50)
ERYTHROCYTE [SEDIMENTATION RATE] IN BLOOD BY WESTERGREN METHOD: 90 MM/HOUR (ref 0–20)
GLOBULIN SER CALC-MCNC: 6.2 G/DL (ref 1.9–3.5)
GLUCOSE SERPL-MCNC: 65 MG/DL (ref 65–99)
GLUCOSE UR STRIP.AUTO-MCNC: NEGATIVE MG/DL
HBV CORE AB SERPL QL IA: NEGATIVE
HBV SURFACE AG SER QL: NEGATIVE
HCT VFR BLD AUTO: 37.7 % (ref 37–47)
HCT VFR BLD AUTO: 37.9 % (ref 37–47)
HCV AB SER QL: NEGATIVE
HGB BLD-MCNC: 12.1 G/DL (ref 12–16)
HGB BLD-MCNC: 12.2 G/DL (ref 12–16)
HIV 1+2 AB+HIV1 P24 AG SERPL QL IA: NON REACTIVE
HYALINE CASTS #/AREA URNS LPF: ABNORMAL /LPF
IMM GRANULOCYTES # BLD AUTO: 0.02 K/UL (ref 0–0.11)
IMM GRANULOCYTES # BLD AUTO: 0.02 K/UL (ref 0–0.11)
IMM GRANULOCYTES NFR BLD AUTO: 0.3 % (ref 0–0.9)
IMM GRANULOCYTES NFR BLD AUTO: 0.3 % (ref 0–0.9)
INR PPP: 1.01 (ref 0.87–1.13)
IRON SATN MFR SERPL: 30 % (ref 15–55)
IRON SERPL-MCNC: 87 UG/DL (ref 40–170)
KETONES UR STRIP.AUTO-MCNC: NEGATIVE MG/DL
LA PPP-IMP: PRESENT
LEUKOCYTE ESTERASE UR QL STRIP.AUTO: NEGATIVE
LYMPHOCYTES # BLD AUTO: 2.1 K/UL (ref 1–4.8)
LYMPHOCYTES # BLD AUTO: 2.29 K/UL (ref 1–4.8)
LYMPHOCYTES NFR BLD: 34.9 % (ref 22–41)
LYMPHOCYTES NFR BLD: 36.5 % (ref 22–41)
MCH RBC QN AUTO: 32.7 PG (ref 27–33)
MCH RBC QN AUTO: 32.7 PG (ref 27–33)
MCHC RBC AUTO-ENTMCNC: 32.1 G/DL (ref 33.6–35)
MCHC RBC AUTO-ENTMCNC: 32.2 G/DL (ref 33.6–35)
MCV RBC AUTO: 101.6 FL (ref 81.4–97.8)
MCV RBC AUTO: 101.9 FL (ref 81.4–97.8)
MEDICATIONS NOTED 1688: NORMAL
MICRO URNS: ABNORMAL
MONOCYTES # BLD AUTO: 0.18 K/UL (ref 0–0.85)
MONOCYTES # BLD AUTO: 0.2 K/UL (ref 0–0.85)
MONOCYTES NFR BLD AUTO: 2.9 % (ref 0–13.4)
MONOCYTES NFR BLD AUTO: 3.3 % (ref 0–13.4)
NEUTROPHILS # BLD AUTO: 3.67 K/UL (ref 2–7.15)
NEUTROPHILS # BLD AUTO: 3.73 K/UL (ref 2–7.15)
NEUTROPHILS NFR BLD: 59.5 % (ref 44–72)
NEUTROPHILS NFR BLD: 61 % (ref 44–72)
NITRITE UR QL STRIP.AUTO: POSITIVE
NRBC # BLD AUTO: 0 K/UL
NRBC # BLD AUTO: 0 K/UL
NRBC BLD-RTO: 0 /100 WBC
NRBC BLD-RTO: 0 /100 WBC
PH UR STRIP.AUTO: 6 [PH]
PLATELET # BLD AUTO: 202 K/UL (ref 164–446)
PLATELET # BLD AUTO: 216 K/UL (ref 164–446)
PLT FUNCTION COL/EPI  1661: 119 SEC (ref 83–170)
PMV BLD AUTO: 10.5 FL (ref 9–12.9)
PMV BLD AUTO: 10.6 FL (ref 9–12.9)
POTASSIUM SERPL-SCNC: 3.5 MMOL/L (ref 3.6–5.5)
PROT SERPL-MCNC: 10 G/DL (ref 6–8.2)
PROT UR QL STRIP: NEGATIVE MG/DL
PROTHROMBIN TIME: 13.4 SEC (ref 12–14.6)
RBC # BLD AUTO: 3.7 M/UL (ref 4.2–5.4)
RBC # BLD AUTO: 3.73 M/UL (ref 4.2–5.4)
RBC # URNS HPF: ABNORMAL /HPF
RBC UR QL AUTO: NEGATIVE
SCREEN DRVVT: 47.8 SEC (ref 28–48)
SODIUM SERPL-SCNC: 134 MMOL/L (ref 135–145)
SP GR UR STRIP.AUTO: 1.01
THROMBIN TIME: 16.8 SEC
TIBC SERPL-MCNC: 286 UG/DL (ref 250–450)
TREPONEMA PALLIDUM IGG+IGM AB [PRESENCE] IN SERUM OR PLASMA BY IMMUNOASSAY: NON REACTIVE
UFH PPP CHRO-ACNC: <0.1 U/ML
UROBILINOGEN UR STRIP.AUTO-MCNC: 0.2 MG/DL
VIT B12 SERPL-MCNC: 676 PG/ML (ref 211–911)
WBC # BLD AUTO: 6 K/UL (ref 4.8–10.8)
WBC # BLD AUTO: 6.3 K/UL (ref 4.8–10.8)
WBC #/AREA URNS HPF: ABNORMAL /HPF

## 2019-01-28 PROCEDURE — 86803 HEPATITIS C AB TEST: CPT

## 2019-01-28 PROCEDURE — 99213 OFFICE O/P EST LOW 20 MIN: CPT | Mod: GC | Performed by: PSYCHIATRY & NEUROLOGY

## 2019-01-28 PROCEDURE — 82607 VITAMIN B-12: CPT

## 2019-01-28 PROCEDURE — 86780 TREPONEMA PALLIDUM: CPT

## 2019-01-28 PROCEDURE — 85652 RBC SED RATE AUTOMATED: CPT

## 2019-01-28 PROCEDURE — 85613 RUSSELL VIPER VENOM DILUTED: CPT

## 2019-01-28 PROCEDURE — 85730 THROMBOPLASTIN TIME PARTIAL: CPT

## 2019-01-28 PROCEDURE — 86038 ANTINUCLEAR ANTIBODIES: CPT

## 2019-01-28 PROCEDURE — 77077 JOINT SURVEY SINGLE VIEW: CPT

## 2019-01-28 PROCEDURE — 85610 PROTHROMBIN TIME: CPT

## 2019-01-28 PROCEDURE — 83540 ASSAY OF IRON: CPT

## 2019-01-28 PROCEDURE — 80053 COMPREHEN METABOLIC PANEL: CPT

## 2019-01-28 PROCEDURE — 82595 ASSAY OF CRYOGLOBULIN: CPT

## 2019-01-28 PROCEDURE — 86225 DNA ANTIBODY NATIVE: CPT

## 2019-01-28 PROCEDURE — 86140 C-REACTIVE PROTEIN: CPT

## 2019-01-28 PROCEDURE — 86235 NUCLEAR ANTIGEN ANTIBODY: CPT

## 2019-01-28 PROCEDURE — 87340 HEPATITIS B SURFACE AG IA: CPT

## 2019-01-28 PROCEDURE — 86146 BETA-2 GLYCOPROTEIN ANTIBODY: CPT

## 2019-01-28 PROCEDURE — 86704 HEP B CORE ANTIBODY TOTAL: CPT

## 2019-01-28 PROCEDURE — 85025 COMPLETE CBC W/AUTO DIFF WBC: CPT

## 2019-01-28 PROCEDURE — 71046 X-RAY EXAM CHEST 2 VIEWS: CPT

## 2019-01-28 PROCEDURE — 83516 IMMUNOASSAY NONANTIBODY: CPT

## 2019-01-28 PROCEDURE — 87389 HIV-1 AG W/HIV-1&-2 AB AG IA: CPT

## 2019-01-28 PROCEDURE — 85670 THROMBIN TIME PLASMA: CPT

## 2019-01-28 PROCEDURE — 36415 COLL VENOUS BLD VENIPUNCTURE: CPT

## 2019-01-28 PROCEDURE — 81001 URINALYSIS AUTO W/SCOPE: CPT

## 2019-01-28 PROCEDURE — 86147 CARDIOLIPIN ANTIBODY EA IG: CPT

## 2019-01-28 PROCEDURE — 85576 BLOOD PLATELET AGGREGATION: CPT

## 2019-01-28 PROCEDURE — 85025 COMPLETE CBC W/AUTO DIFF WBC: CPT | Mod: 91

## 2019-01-28 PROCEDURE — 86160 COMPLEMENT ANTIGEN: CPT | Mod: 91

## 2019-01-28 PROCEDURE — 85520 HEPARIN ASSAY: CPT

## 2019-01-28 PROCEDURE — 83550 IRON BINDING TEST: CPT

## 2019-01-28 PROCEDURE — 85732 THROMBOPLASTIN TIME PARTIAL: CPT

## 2019-01-28 PROCEDURE — 86039 ANTINUCLEAR ANTIBODIES (ANA): CPT

## 2019-01-28 RX ORDER — LAMOTRIGINE 100 MG/1
100 TABLET ORAL DAILY
Qty: 90 TAB | Refills: 1 | Status: SHIPPED | OUTPATIENT
Start: 2019-01-28 | End: 2019-05-28 | Stop reason: SDUPTHER

## 2019-01-28 RX ORDER — TRAZODONE HYDROCHLORIDE 100 MG/1
100 TABLET ORAL NIGHTLY PRN
Qty: 90 TAB | Refills: 1 | Status: SHIPPED | OUTPATIENT
Start: 2019-01-28 | End: 2019-11-22 | Stop reason: SDUPTHER

## 2019-01-28 RX ORDER — LORAZEPAM 1 MG/1
1 TABLET ORAL
Qty: 30 TAB | Refills: 1 | Status: SHIPPED
Start: 2019-01-28 | End: 2019-02-27

## 2019-01-28 RX ORDER — ARIPIPRAZOLE 5 MG/1
5 TABLET ORAL DAILY
Qty: 90 TAB | Refills: 1 | Status: SHIPPED | OUTPATIENT
Start: 2019-01-28 | End: 2019-05-28
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
## 2019-01-30 ENCOUNTER — TELEPHONE (OUTPATIENT)
Dept: HEMATOLOGY ONCOLOGY | Facility: MEDICAL CENTER | Age: 44
End: 2019-01-30

## 2019-01-30 LAB
B2 GLYCOPROT1 IGG SERPL IA-ACNC: 0 SGU (ref 0–20)
B2 GLYCOPROT1 IGM SERPL IA-ACNC: 8 SMU (ref 0–20)
CARDIOLIPIN IGA SER IA-ACNC: 8 APL (ref 0–11)
CARDIOLIPIN IGG SER IA-ACNC: 18 GPL (ref 0–14)
CARDIOLIPIN IGM SER IA-ACNC: 99 MPL (ref 0–12)
CENTROMERE IGG TITR SER IF: 7 AU/ML (ref 0–40)
DSDNA AB TITR SER CLIF: NORMAL {TITER}
ENA JO1 AB TITR SER: 1 AU/ML (ref 0–40)
ENA SCL70 IGG SER QL: 2 AU/ML (ref 0–40)
ENA SM IGG SER-ACNC: 0 AU/ML (ref 0–40)
ENA SS-B IGG SER IA-ACNC: 96 AU/ML (ref 0–40)
NUCLEAR IGG SER QL IA: DETECTED
RIBOSOMAL P AB SER-ACNC: 7 AU/ML (ref 0–40)
SSA52 R0ENA AB IGG Q0420: 176 AU/ML (ref 0–40)
SSA60 R0ENA AB IGG Q0419: 102 AU/ML (ref 0–40)
U1 SNRNP IGG SER QL: 1 AU/ML (ref 0–40)

## 2019-01-30 NOTE — TELEPHONE ENCOUNTER
Patient seen by [facility] physician, Dr. Reid. All labs ordered were reviewed with Dr. Monsalve.  Platelet functioning was within normal limits.  Iron studies were within normal limits, as well as vitamin B12..  Is established with rheumatology and further workup is being completed at this time.  After further discussion with Dr. Monsalve, there is no need for any further follow-up with hematology at this time and patient to continue to follow-up with rheumatology as recommended.

## 2019-02-01 LAB
ANA INTERPRETIVE COMMENT Q5143: ABNORMAL
ANA PATTERN Q5144: ABNORMAL
ANA TITER Q5145: ABNORMAL
ANTINUCLEAR ANTIBODY (ANA), HEP-2, IGG Q5142: DETECTED
CRYOGLOB SER QL 3D COLD INC: NORMAL
CYTOPLASMIC PATTERN TITER Q5148: ABNORMAL

## 2019-02-13 RX ORDER — CYCLOBENZAPRINE HCL 10 MG
TABLET ORAL
Refills: 0 | OUTPATIENT
Start: 2019-02-13

## 2019-05-15 ENCOUNTER — HOSPITAL ENCOUNTER (OUTPATIENT)
Dept: LAB | Facility: MEDICAL CENTER | Age: 44
End: 2019-05-15
Attending: INTERNAL MEDICINE
Payer: COMMERCIAL

## 2019-05-15 DIAGNOSIS — M32.8 OTHER FORMS OF SYSTEMIC LUPUS ERYTHEMATOSUS, UNSPECIFIED ORGAN INVOLVEMENT STATUS (HCC): ICD-10-CM

## 2019-05-15 DIAGNOSIS — M35.00 SJOGREN'S SYNDROME, WITH UNSPECIFIED ORGAN INVOLVEMENT (HCC): ICD-10-CM

## 2019-05-15 DIAGNOSIS — Z79.899 LONG-TERM USE OF PLAQUENIL: ICD-10-CM

## 2019-05-15 LAB
ALBUMIN SERPL BCP-MCNC: 4 G/DL (ref 3.2–4.9)
ALBUMIN/GLOB SERPL: 0.7 G/DL
ALP SERPL-CCNC: 101 U/L (ref 30–99)
ALT SERPL-CCNC: 22 U/L (ref 2–50)
ANION GAP SERPL CALC-SCNC: 3 MMOL/L (ref 0–11.9)
APPEARANCE UR: CLEAR
AST SERPL-CCNC: 26 U/L (ref 12–45)
BASOPHILS # BLD AUTO: 0.6 % (ref 0–1.8)
BASOPHILS # BLD: 0.02 K/UL (ref 0–0.12)
BILIRUB SERPL-MCNC: 0.3 MG/DL (ref 0.1–1.5)
BILIRUB UR QL STRIP.AUTO: NEGATIVE
BUN SERPL-MCNC: 8 MG/DL (ref 8–22)
C3 SERPL-MCNC: 105 MG/DL (ref 87–200)
C4 SERPL-MCNC: 15 MG/DL (ref 19–52)
CALCIUM SERPL-MCNC: 9.5 MG/DL (ref 8.5–10.5)
CHLORIDE SERPL-SCNC: 106 MMOL/L (ref 96–112)
CO2 SERPL-SCNC: 25 MMOL/L (ref 20–33)
COLOR UR: YELLOW
CREAT SERPL-MCNC: 0.83 MG/DL (ref 0.5–1.4)
CRP SERPL HS-MCNC: 0.62 MG/DL (ref 0–0.75)
EOSINOPHIL # BLD AUTO: 0.02 K/UL (ref 0–0.51)
EOSINOPHIL NFR BLD: 0.6 % (ref 0–6.9)
ERYTHROCYTE [DISTWIDTH] IN BLOOD BY AUTOMATED COUNT: 53.3 FL (ref 35.9–50)
ERYTHROCYTE [SEDIMENTATION RATE] IN BLOOD BY WESTERGREN METHOD: 29 MM/HOUR (ref 0–20)
FASTING STATUS PATIENT QL REPORTED: NORMAL
GLOBULIN SER CALC-MCNC: 6 G/DL (ref 1.9–3.5)
GLUCOSE SERPL-MCNC: 81 MG/DL (ref 65–99)
GLUCOSE UR STRIP.AUTO-MCNC: NEGATIVE MG/DL
HCT VFR BLD AUTO: 38.9 % (ref 37–47)
HGB BLD-MCNC: 12.2 G/DL (ref 12–16)
IMM GRANULOCYTES # BLD AUTO: 0.01 K/UL (ref 0–0.11)
IMM GRANULOCYTES NFR BLD AUTO: 0.3 % (ref 0–0.9)
KETONES UR STRIP.AUTO-MCNC: NEGATIVE MG/DL
LEUKOCYTE ESTERASE UR QL STRIP.AUTO: NEGATIVE
LYMPHOCYTES # BLD AUTO: 1.64 K/UL (ref 1–4.8)
LYMPHOCYTES NFR BLD: 49.5 % (ref 22–41)
MCH RBC QN AUTO: 31.4 PG (ref 27–33)
MCHC RBC AUTO-ENTMCNC: 31.4 G/DL (ref 33.6–35)
MCV RBC AUTO: 100 FL (ref 81.4–97.8)
MICRO URNS: NORMAL
MONOCYTES # BLD AUTO: 0.19 K/UL (ref 0–0.85)
MONOCYTES NFR BLD AUTO: 5.7 % (ref 0–13.4)
NEUTROPHILS # BLD AUTO: 1.43 K/UL (ref 2–7.15)
NEUTROPHILS NFR BLD: 43.3 % (ref 44–72)
NITRITE UR QL STRIP.AUTO: NEGATIVE
NRBC # BLD AUTO: 0.08 K/UL
NRBC BLD-RTO: 2.4 /100 WBC
PH UR STRIP.AUTO: 6.5 [PH]
PLATELET # BLD AUTO: 156 K/UL (ref 164–446)
PMV BLD AUTO: 11.3 FL (ref 9–12.9)
POTASSIUM SERPL-SCNC: 3.5 MMOL/L (ref 3.6–5.5)
PROT SERPL-MCNC: 10 G/DL (ref 6–8.2)
PROT UR QL STRIP: NEGATIVE MG/DL
RBC # BLD AUTO: 3.89 M/UL (ref 4.2–5.4)
RBC UR QL AUTO: NEGATIVE
SODIUM SERPL-SCNC: 134 MMOL/L (ref 135–145)
SP GR UR STRIP.AUTO: 1.01
UROBILINOGEN UR STRIP.AUTO-MCNC: 0.2 MG/DL
WBC # BLD AUTO: 3.3 K/UL (ref 4.8–10.8)

## 2019-05-15 PROCEDURE — 84160 ASSAY OF PROTEIN ANY SOURCE: CPT

## 2019-05-15 PROCEDURE — 86160 COMPLEMENT ANTIGEN: CPT

## 2019-05-15 PROCEDURE — 86140 C-REACTIVE PROTEIN: CPT

## 2019-05-15 PROCEDURE — 36415 COLL VENOUS BLD VENIPUNCTURE: CPT

## 2019-05-15 PROCEDURE — 84165 PROTEIN E-PHORESIS SERUM: CPT

## 2019-05-15 PROCEDURE — 81003 URINALYSIS AUTO W/O SCOPE: CPT

## 2019-05-15 PROCEDURE — 85025 COMPLETE CBC W/AUTO DIFF WBC: CPT

## 2019-05-15 PROCEDURE — 85652 RBC SED RATE AUTOMATED: CPT

## 2019-05-15 PROCEDURE — 80053 COMPREHEN METABOLIC PANEL: CPT

## 2019-05-15 PROCEDURE — 86255 FLUORESCENT ANTIBODY SCREEN: CPT

## 2019-05-15 PROCEDURE — 82955 ASSAY OF G6PD ENZYME: CPT

## 2019-05-18 LAB
ALBUMIN SERPL-MCNC: 4.05 G/DL (ref 3.75–5.01)
ALPHA1 GLOB SERPL ELPH-MCNC: 0.32 G/DL (ref 0.19–0.46)
ALPHA2 GLOB SERPL ELPH-MCNC: 0.69 G/DL (ref 0.48–1.05)
B-GLOBULIN SERPL ELPH-MCNC: 1.05 G/DL (ref 0.48–1.1)
G6PD RBC-CCNC: 14.6 U/G HB (ref 9.9–16.6)
GAMMA GLOB SERPL ELPH-MCNC: 3.89 G/DL (ref 0.62–1.51)
INTERPRETATION SERPL IFE-IMP: ABNORMAL
MONOCLON BAND OBS SERPL: ABNORMAL
PATHOLOGY STUDY: ABNORMAL
PROT SERPL-MCNC: 10 G/DL (ref 6–8.3)

## 2019-05-20 LAB — ANCA IGG TITR SER IF: NORMAL {TITER}

## 2019-05-28 ENCOUNTER — OFFICE VISIT (OUTPATIENT)
Dept: BEHAVIORAL HEALTH | Facility: CLINIC | Age: 44
End: 2019-05-28
Payer: COMMERCIAL

## 2019-05-28 VITALS
HEIGHT: 65 IN | DIASTOLIC BLOOD PRESSURE: 90 MMHG | SYSTOLIC BLOOD PRESSURE: 132 MMHG | HEART RATE: 117 BPM | BODY MASS INDEX: 28.99 KG/M2 | WEIGHT: 174 LBS

## 2019-05-28 DIAGNOSIS — F43.10 PTSD (POST-TRAUMATIC STRESS DISORDER): ICD-10-CM

## 2019-05-28 DIAGNOSIS — F33.0 MILD EPISODE OF RECURRENT MAJOR DEPRESSIVE DISORDER (HCC): ICD-10-CM

## 2019-05-28 PROCEDURE — 99214 OFFICE O/P EST MOD 30 MIN: CPT | Mod: GC | Performed by: PSYCHIATRY & NEUROLOGY

## 2019-05-28 RX ORDER — LAMOTRIGINE 100 MG/1
100 TABLET ORAL DAILY
Qty: 90 TAB | Refills: 1 | Status: SHIPPED | OUTPATIENT
Start: 2019-05-28 | End: 2019-11-22 | Stop reason: SDUPTHER

## 2019-05-28 RX ORDER — DULOXETIN HYDROCHLORIDE 20 MG/1
20 CAPSULE, DELAYED RELEASE ORAL DAILY
Qty: 30 CAP | Refills: 1 | Status: SHIPPED | OUTPATIENT
Start: 2019-05-28 | End: 2019-11-22
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
## 2019-08-06 ENCOUNTER — PATIENT MESSAGE (OUTPATIENT)
Dept: MEDICAL GROUP | Facility: PHYSICIAN GROUP | Age: 44
End: 2019-08-06

## 2019-08-06 DIAGNOSIS — M79.7 FIBROMYALGIA: ICD-10-CM

## 2019-08-06 RX ORDER — GABAPENTIN 600 MG/1
600 TABLET ORAL 3 TIMES DAILY
Qty: 270 TAB | Refills: 0 | Status: SHIPPED | OUTPATIENT
Start: 2019-08-06 | End: 2019-10-04 | Stop reason: SDUPTHER

## 2019-08-06 RX ORDER — CYCLOBENZAPRINE HCL 10 MG
10 TABLET ORAL
Qty: 30 TAB | Refills: 1 | Status: SHIPPED | OUTPATIENT
Start: 2019-08-06 | End: 2019-12-05 | Stop reason: SDUPTHER

## 2019-08-06 NOTE — PATIENT COMMUNICATION
Was the patient seen in the last year in this department? Yes    Does patient have an active prescription for medications requested? No     Received Request Via: Patient     Last OV 5/28/19  Labs 5/15/19

## 2019-10-04 DIAGNOSIS — M79.7 FIBROMYALGIA: ICD-10-CM

## 2019-10-04 NOTE — TELEPHONE ENCOUNTER
Was the patient seen in the last year in this department? No     Does patient have an active prescription for medications requested? No     Received Request Via: Pharmacy      Pt met protocol?: No    *PT NEEDS TO MAKE APPT WITH PCP

## 2019-10-07 RX ORDER — GABAPENTIN 600 MG/1
TABLET ORAL
Qty: 270 TAB | Refills: 0 | Status: SHIPPED | OUTPATIENT
Start: 2019-10-07 | End: 2019-12-05

## 2019-11-18 ENCOUNTER — HOSPITAL ENCOUNTER (OUTPATIENT)
Dept: LAB | Facility: MEDICAL CENTER | Age: 44
End: 2019-11-18
Attending: INTERNAL MEDICINE
Payer: COMMERCIAL

## 2019-11-18 LAB
ALBUMIN SERPL BCP-MCNC: 4 G/DL (ref 3.2–4.9)
ALBUMIN/GLOB SERPL: 0.7 G/DL
ALP SERPL-CCNC: 146 U/L (ref 30–99)
ALT SERPL-CCNC: 27 U/L (ref 2–50)
ANION GAP SERPL CALC-SCNC: 6 MMOL/L (ref 0–11.9)
APPEARANCE UR: CLEAR
AST SERPL-CCNC: 29 U/L (ref 12–45)
BASOPHILS # BLD AUTO: 0.4 % (ref 0–1.8)
BASOPHILS # BLD: 0.01 K/UL (ref 0–0.12)
BILIRUB SERPL-MCNC: 0.4 MG/DL (ref 0.1–1.5)
BILIRUB UR QL STRIP.AUTO: NEGATIVE
BUN SERPL-MCNC: 10 MG/DL (ref 8–22)
C3 SERPL-MCNC: 108 MG/DL (ref 87–200)
C4 SERPL-MCNC: 14 MG/DL (ref 19–52)
CALCIUM SERPL-MCNC: 9.5 MG/DL (ref 8.5–10.5)
CHLORIDE SERPL-SCNC: 107 MMOL/L (ref 96–112)
CO2 SERPL-SCNC: 26 MMOL/L (ref 20–33)
COLOR UR: YELLOW
CREAT SERPL-MCNC: 0.82 MG/DL (ref 0.5–1.4)
CRP SERPL HS-MCNC: 0.76 MG/DL (ref 0–0.75)
EOSINOPHIL # BLD AUTO: 0.04 K/UL (ref 0–0.51)
EOSINOPHIL NFR BLD: 1.5 % (ref 0–6.9)
ERYTHROCYTE [DISTWIDTH] IN BLOOD BY AUTOMATED COUNT: 51.8 FL (ref 35.9–50)
ERYTHROCYTE [SEDIMENTATION RATE] IN BLOOD BY WESTERGREN METHOD: 31 MM/HOUR (ref 0–20)
GLOBULIN SER CALC-MCNC: 5.8 G/DL (ref 1.9–3.5)
GLUCOSE SERPL-MCNC: 95 MG/DL (ref 65–99)
GLUCOSE UR STRIP.AUTO-MCNC: NEGATIVE MG/DL
HCT VFR BLD AUTO: 39.5 % (ref 37–47)
HGB BLD-MCNC: 13 G/DL (ref 12–16)
IMM GRANULOCYTES # BLD AUTO: 0.02 K/UL (ref 0–0.11)
IMM GRANULOCYTES NFR BLD AUTO: 0.7 % (ref 0–0.9)
KETONES UR STRIP.AUTO-MCNC: NEGATIVE MG/DL
LEUKOCYTE ESTERASE UR QL STRIP.AUTO: NEGATIVE
LYMPHOCYTES # BLD AUTO: 1.31 K/UL (ref 1–4.8)
LYMPHOCYTES NFR BLD: 49.1 % (ref 22–41)
MCH RBC QN AUTO: 32.3 PG (ref 27–33)
MCHC RBC AUTO-ENTMCNC: 32.9 G/DL (ref 33.6–35)
MCV RBC AUTO: 98.3 FL (ref 81.4–97.8)
MICRO URNS: NORMAL
MONOCYTES # BLD AUTO: 0.19 K/UL (ref 0–0.85)
MONOCYTES NFR BLD AUTO: 7.1 % (ref 0–13.4)
NEUTROPHILS # BLD AUTO: 1.1 K/UL (ref 2–7.15)
NEUTROPHILS NFR BLD: 41.2 % (ref 44–72)
NITRITE UR QL STRIP.AUTO: NEGATIVE
NRBC # BLD AUTO: 0 K/UL
NRBC BLD-RTO: 0 /100 WBC
PH UR STRIP.AUTO: 6 [PH] (ref 5–8)
PLATELET # BLD AUTO: 173 K/UL (ref 164–446)
PMV BLD AUTO: 11 FL (ref 9–12.9)
POTASSIUM SERPL-SCNC: 4.3 MMOL/L (ref 3.6–5.5)
PROT SERPL-MCNC: 9.8 G/DL (ref 6–8.2)
PROT UR QL STRIP: NEGATIVE MG/DL
RBC # BLD AUTO: 4.02 M/UL (ref 4.2–5.4)
RBC UR QL AUTO: NEGATIVE
RHEUMATOID FACT SER IA-ACNC: 268 IU/ML (ref 0–14)
SODIUM SERPL-SCNC: 139 MMOL/L (ref 135–145)
SP GR UR STRIP.AUTO: 1.01
UROBILINOGEN UR STRIP.AUTO-MCNC: 0.2 MG/DL
WBC # BLD AUTO: 2.7 K/UL (ref 4.8–10.8)

## 2019-11-18 PROCEDURE — 85652 RBC SED RATE AUTOMATED: CPT

## 2019-11-18 PROCEDURE — 80053 COMPREHEN METABOLIC PANEL: CPT

## 2019-11-18 PROCEDURE — 86160 COMPLEMENT ANTIGEN: CPT

## 2019-11-18 PROCEDURE — 86431 RHEUMATOID FACTOR QUANT: CPT

## 2019-11-18 PROCEDURE — 81003 URINALYSIS AUTO W/O SCOPE: CPT

## 2019-11-18 PROCEDURE — 86140 C-REACTIVE PROTEIN: CPT

## 2019-11-18 PROCEDURE — 85025 COMPLETE CBC W/AUTO DIFF WBC: CPT

## 2019-11-18 PROCEDURE — 36415 COLL VENOUS BLD VENIPUNCTURE: CPT

## 2019-11-22 ENCOUNTER — OFFICE VISIT (OUTPATIENT)
Dept: BEHAVIORAL HEALTH | Facility: CLINIC | Age: 44
End: 2019-11-22
Payer: COMMERCIAL

## 2019-11-22 VITALS
WEIGHT: 182 LBS | HEART RATE: 95 BPM | BODY MASS INDEX: 30.32 KG/M2 | DIASTOLIC BLOOD PRESSURE: 80 MMHG | SYSTOLIC BLOOD PRESSURE: 130 MMHG | HEIGHT: 65 IN

## 2019-11-22 DIAGNOSIS — F43.10 PTSD (POST-TRAUMATIC STRESS DISORDER): ICD-10-CM

## 2019-11-22 DIAGNOSIS — F33.1 MODERATE EPISODE OF RECURRENT MAJOR DEPRESSIVE DISORDER (HCC): ICD-10-CM

## 2019-11-22 PROCEDURE — 99214 OFFICE O/P EST MOD 30 MIN: CPT | Mod: GC | Performed by: PSYCHIATRY & NEUROLOGY

## 2019-11-22 RX ORDER — LORAZEPAM 0.5 MG/1
0.5 TABLET ORAL 2 TIMES DAILY PRN
Qty: 60 TAB | Refills: 0 | Status: SHIPPED | OUTPATIENT
Start: 2019-11-22 | End: 2019-12-22

## 2019-11-22 RX ORDER — ARIPIPRAZOLE 5 MG/1
5 TABLET ORAL DAILY
Qty: 90 TAB | Refills: 1 | Status: SHIPPED | OUTPATIENT
Start: 2019-11-22 | End: 2020-05-29 | Stop reason: SDUPTHER

## 2019-11-22 RX ORDER — TRAZODONE HYDROCHLORIDE 100 MG/1
100 TABLET ORAL NIGHTLY PRN
Qty: 90 TAB | Refills: 1 | Status: SHIPPED | OUTPATIENT
Start: 2019-11-22 | End: 2020-05-29 | Stop reason: SDUPTHER

## 2019-11-22 RX ORDER — LAMOTRIGINE 150 MG/1
150 TABLET ORAL DAILY
Qty: 90 TAB | Refills: 1 | Status: SHIPPED | OUTPATIENT
Start: 2019-11-22 | End: 2019-12-22

## 2019-11-22 RX ORDER — LORAZEPAM 1 MG/1
TABLET ORAL
COMMUNITY
End: 2020-05-28 | Stop reason: SDUPTHER
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
## 2019-12-05 ENCOUNTER — OFFICE VISIT (OUTPATIENT)
Dept: MEDICAL GROUP | Facility: PHYSICIAN GROUP | Age: 44
End: 2019-12-05
Payer: COMMERCIAL

## 2019-12-05 VITALS
HEIGHT: 64 IN | OXYGEN SATURATION: 98 % | TEMPERATURE: 97 F | HEART RATE: 99 BPM | WEIGHT: 187 LBS | RESPIRATION RATE: 14 BRPM | DIASTOLIC BLOOD PRESSURE: 58 MMHG | BODY MASS INDEX: 31.92 KG/M2 | SYSTOLIC BLOOD PRESSURE: 110 MMHG

## 2019-12-05 DIAGNOSIS — M79.7 FIBROMYALGIA: ICD-10-CM

## 2019-12-05 DIAGNOSIS — R74.8 ELEVATED SERUM ALKALINE PHOSPHATASE LEVEL: ICD-10-CM

## 2019-12-05 DIAGNOSIS — M35.00 SJOGREN'S SYNDROME, WITH UNSPECIFIED ORGAN INVOLVEMENT (HCC): ICD-10-CM

## 2019-12-05 DIAGNOSIS — Z23 INFLUENZA VACCINE NEEDED: ICD-10-CM

## 2019-12-05 DIAGNOSIS — M54.2 NECK PAIN: ICD-10-CM

## 2019-12-05 DIAGNOSIS — M32.9 SLE (SYSTEMIC LUPUS ERYTHEMATOSUS RELATED SYNDROME) (HCC): ICD-10-CM

## 2019-12-05 DIAGNOSIS — F33.1 MODERATE EPISODE OF RECURRENT MAJOR DEPRESSIVE DISORDER (HCC): ICD-10-CM

## 2019-12-05 PROCEDURE — 90471 IMMUNIZATION ADMIN: CPT | Performed by: NURSE PRACTITIONER

## 2019-12-05 PROCEDURE — 99214 OFFICE O/P EST MOD 30 MIN: CPT | Mod: 25 | Performed by: NURSE PRACTITIONER

## 2019-12-05 PROCEDURE — 90686 IIV4 VACC NO PRSV 0.5 ML IM: CPT | Performed by: NURSE PRACTITIONER

## 2019-12-05 RX ORDER — CYCLOBENZAPRINE HCL 10 MG
10 TABLET ORAL
Qty: 30 TAB | Refills: 6 | Status: SHIPPED | OUTPATIENT
Start: 2019-12-05 | End: 2020-09-17 | Stop reason: SDUPTHER

## 2019-12-05 RX ORDER — PREGABALIN 75 MG/1
75 CAPSULE ORAL 2 TIMES DAILY
Qty: 60 CAP | Refills: 2 | Status: SHIPPED | OUTPATIENT
Start: 2019-12-05 | End: 2020-03-11 | Stop reason: SDUPTHER

## 2019-12-05 NOTE — ASSESSMENT & PLAN NOTE
Patient continues to have elevated alkaline phosphatase.  Abdominal ultrasound has been ordered by rheumatology, pending.    Component      Latest Ref Rng & Units 1/28/2019 5/15/2019 11/18/2019           9:24 AM  7:31 AM  9:40 AM   Alkaline Phosphatase      30 - 99 U/L 129 (H) 101 (H) 146 (H)
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
## 2019-12-05 NOTE — ASSESSMENT & PLAN NOTE
Patient is 44-year-old female with major depressive disorder and PTSD.  Now being managed by psychiatry, whom she sees every 3 months.  Taking lamotrigine, trazodone, Abilify, lorazepam.

## 2019-12-05 NOTE — ASSESSMENT & PLAN NOTE
Patient is 44-year-old female recently diagnosed with SLE, Sjogren's syndrome, polyarthritis with positive rheumatoid factor.  Now being managed by rheumatology, Dr. Qulies.  Long-term use of Plaquenil.

## 2019-12-05 NOTE — ASSESSMENT & PLAN NOTE
This is a chronic health problem that is uncontrolled with current medications and lifestyle measures.  Taking gabapentin and as needed Flexeril.  Forgetting afternoon dose of gabapentin and then pain increases.  If remembers to take gabapentin TID, then good control.  Pain mostly in neck and shoulders.  Asking to try a BID medication.  Patient already taking medications for depression, so do not want to add duloxetine just yet.  Will trial Lyrica.
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
## 2019-12-14 ENCOUNTER — HOSPITAL ENCOUNTER (OUTPATIENT)
Dept: RADIOLOGY | Facility: MEDICAL CENTER | Age: 44
End: 2019-12-14
Attending: NURSE PRACTITIONER
Payer: COMMERCIAL

## 2019-12-14 DIAGNOSIS — Z12.31 VISIT FOR SCREENING MAMMOGRAM: ICD-10-CM

## 2019-12-14 PROCEDURE — 77067 SCR MAMMO BI INCL CAD: CPT

## 2019-12-18 ENCOUNTER — OFFICE VISIT (OUTPATIENT)
Dept: MEDICAL GROUP | Facility: PHYSICIAN GROUP | Age: 44
End: 2019-12-18
Payer: COMMERCIAL

## 2019-12-18 VITALS
HEIGHT: 64 IN | DIASTOLIC BLOOD PRESSURE: 68 MMHG | WEIGHT: 186 LBS | TEMPERATURE: 97.9 F | SYSTOLIC BLOOD PRESSURE: 120 MMHG | OXYGEN SATURATION: 97 % | BODY MASS INDEX: 31.76 KG/M2 | RESPIRATION RATE: 12 BRPM | HEART RATE: 113 BPM

## 2019-12-18 DIAGNOSIS — R52 PAIN ASSOCIATED WITH WOUND: ICD-10-CM

## 2019-12-18 DIAGNOSIS — N76.0 LABIAL INFECTION: ICD-10-CM

## 2019-12-18 DIAGNOSIS — T14.8XXA PAIN ASSOCIATED WITH WOUND: ICD-10-CM

## 2019-12-18 PROCEDURE — 99213 OFFICE O/P EST LOW 20 MIN: CPT | Performed by: NURSE PRACTITIONER

## 2019-12-18 RX ORDER — TRAMADOL HYDROCHLORIDE 50 MG/1
50 TABLET ORAL EVERY 4 HOURS PRN
Qty: 30 TAB | Refills: 0 | Status: SHIPPED | OUTPATIENT
Start: 2019-12-18 | End: 2019-12-28

## 2019-12-18 RX ORDER — SULFAMETHOXAZOLE AND TRIMETHOPRIM 800; 160 MG/1; MG/1
1 TABLET ORAL 2 TIMES DAILY
Qty: 14 TAB | Refills: 0 | Status: SHIPPED
Start: 2019-12-18 | End: 2020-03-03

## 2019-12-18 NOTE — ASSESSMENT & PLAN NOTE
Patient reports painful nodule in labial area. Hurts to sit on it or walk. Onset yesterday.  The night before noticing nodule, was very constipated and strained a lot.  Denies fever, chills, dysuria.  Actually active with , has not had intercourse in over a week.  Naproxen not helping.  Patient requesting something stronger than naproxen for pain.

## 2019-12-18 NOTE — PROGRESS NOTES
CC:  Painful nodule     HISTORY OF THE PRESENT ILLNESS: Patient is a 44 y.o. female. This pleasant patient is here today for evaluation and management of the following health problems.      Labial infection  Patient reports painful nodule in labial area. Hurts to sit on it or walk. Onset yesterday.  The night before noticing nodule, was very constipated and strained a lot.  Denies fever, chills, dysuria.  Actually active with , has not had intercourse in over a week.  Naproxen not helping.  Patient requesting something stronger than naproxen for pain.      Allergies: Codeine    Current Outpatient Medications Ordered in Epic   Medication Sig Dispense Refill   • aspirin EC (ECOTRIN) 81 MG Tablet Delayed Response Take 81 mg by mouth every day.     • tramadol (ULTRAM) 50 MG Tab Take 1 Tab by mouth every four hours as needed for up to 10 days. 30 Tab 0   • sulfamethoxazole-trimethoprim (BACTRIM DS) 800-160 MG tablet Take 1 Tab by mouth 2 times a day. 14 Tab 0   • cyclobenzaprine (FLEXERIL) 10 MG Tab Take 1 Tab by mouth 1 time daily as needed for Muscle Spasms. 30 Tab 6   • pregabalin (LYRICA) 75 MG Cap Take 1 Cap by mouth 2 times a day for 90 days. 60 Cap 2   • hydroxychloroquine (PLAQUENIL) 200 MG Tab Take 2 Tabs by mouth every day. 180 Tab 1   • naproxen (NAPROSYN) 500 MG Tab Take 1 Tab by mouth 2 times daily with meals as needed. 180 Tab 1   • LORazepam (ATIVAN) 1 MG Tab Take  by mouth.     • LORazepam (ATIVAN) 0.5 MG Tab Take 1 Tab by mouth 2 times a day as needed for Anxiety for up to 30 days. 60 Tab 0   • lamoTRIgine (LAMICTAL) 150 MG tablet Take 1 Tab by mouth every day for 30 days. 90 Tab 1   • traZODone (DESYREL) 100 MG Tab Take 1 Tab by mouth at bedtime as needed. 90 Tab 1   • aripiprazole (ABILIFY) 5 MG tablet Take 1 Tab by mouth every day. 90 Tab 1   • DiphenhydrAMINE HCl (BENADRYL ALLERGY PO) Take  by mouth.       No current Epic-ordered facility-administered medications on file.        Past Medical  "History:   Diagnosis Date   • Depression    • Fibromyalgia        Past Surgical History:   Procedure Laterality Date   • DENTAL EXTRACTION(S)  1996   • KNEE ARTHROSCOPY     • PRIMARY C SECTION     • TUBAL LIGATION         Social History     Tobacco Use   • Smoking status: Current Every Day Smoker     Packs/day: 0.50     Years: 21.00     Pack years: 10.50   • Smokeless tobacco: Never Used   Substance Use Topics   • Alcohol use: No     Comment: rare   • Drug use: No     Comment: topical lotion with THC for fibromyalgia       Family History   Problem Relation Age of Onset   • Cancer Mother         uterine   • Other Mother         celiac   • Depression Father    • Lung Disease Father    • Bladder cancer Father    • Other Father         drug addict   • Heart Disease Sister    • Other Sister         mental retardation   • Bipolar disorder Brother    • Other Brother         addiction   • Breast Cancer Maternal Grandmother    • Other Maternal Grandmother         stroke   • Prostate cancer Maternal Grandfather    • Heart Disease Paternal Grandmother    • Heart Disease Paternal Grandfather    • Other Brother         paranoid schizophrenia, drug addiction       ROS:   As in HPI, otherwise negative for chest pain, dyspnea, abdominal pain, dysuria, blood in stool, fever           Exam: /68 (BP Location: Right arm, Patient Position: Standing, BP Cuff Size: Adult)   Pulse (!) 113   Temp 36.6 °C (97.9 °F)   Resp 12   Ht 1.626 m (5' 4\")   Wt 84.4 kg (186 lb)   SpO2 97%  Body mass index is 31.93 kg/m².    General: Alert, pleasant, well nourished, well developed female in NAD  Neck: Supple.  Pulmonary: Clear to ausculation.  Normal effort.   Cardiovascular: Normal rate and rhythm without murmur.  Abdomen: Soft, nondistended.  Genitalia:  Left side inner labia with marble-sized firm nodule, rigid, tender to touch, no drainage.   Neurologic: Grossly nonfocal  Skin: Warm and dry.    Musculoskeletal: Normal gait.   Psych: " Normal mood and affect. Alert and oriented. Judgment and insight is normal.    Please note that this dictation was created using voice recognition software. I have made every reasonable attempt to correct obvious errors, but I expect that there are errors of grammar and possibly content that I did not discover before finalizing the note.      Assessment/Plan  1. Labial infection  [place]-sized nodule, new onset.  Likely infection, though still firm.  Will prescribe Bactrim DS.  Instructions decided.  Will also refer to gynecology in case nodule does not resolve with antibiotic.  Return to clinic/urgent care/ER precautions reviewed with patient.  - REFERRAL TO GYNECOLOGY  - sulfamethoxazole-trimethoprim (BACTRIM DS) 800-160 MG tablet; Take 1 Tab by mouth 2 times a day.  Dispense: 14 Tab; Refill: 0    2. Pain associated with wound  Patient requesting something stronger for pain as labial nodule is quite painful.  Patient does take Lorazepam as needed.  She understands not to take Lorazepam when taking tramadol.  She understands this is a temporary prescription.  Reviewed risks of opioids including respiratory depression and that risks are increased when taking with alcohol or benzodiazepines.  Controlled substance consent reviewed and signed today.  - tramadol (ULTRAM) 50 MG Tab; Take 1 Tab by mouth every four hours as needed for up to 10 days.  Dispense: 30 Tab; Refill: 0  - Controlled Substance Treatment Agreement

## 2020-02-22 ENCOUNTER — HOSPITAL ENCOUNTER (OUTPATIENT)
Dept: LAB | Facility: MEDICAL CENTER | Age: 45
End: 2020-02-22
Attending: INTERNAL MEDICINE
Payer: COMMERCIAL

## 2020-02-22 DIAGNOSIS — M35.00 SJOGREN'S SYNDROME, WITH UNSPECIFIED ORGAN INVOLVEMENT (HCC): ICD-10-CM

## 2020-02-22 DIAGNOSIS — M32.8 OTHER FORMS OF SYSTEMIC LUPUS ERYTHEMATOSUS, UNSPECIFIED ORGAN INVOLVEMENT STATUS (HCC): ICD-10-CM

## 2020-02-22 LAB
ALBUMIN SERPL BCP-MCNC: 3.6 G/DL (ref 3.2–4.9)
ALBUMIN/GLOB SERPL: 0.6 G/DL
ALP SERPL-CCNC: 190 U/L (ref 30–99)
ALT SERPL-CCNC: 21 U/L (ref 2–50)
ANION GAP SERPL CALC-SCNC: 8 MMOL/L (ref 0–11.9)
APPEARANCE UR: CLEAR
AST SERPL-CCNC: 27 U/L (ref 12–45)
BACTERIA #/AREA URNS HPF: NEGATIVE /HPF
BASOPHILS # BLD AUTO: 0.3 % (ref 0–1.8)
BASOPHILS # BLD AUTO: 0.3 % (ref 0–1.8)
BASOPHILS # BLD: 0.01 K/UL (ref 0–0.12)
BASOPHILS # BLD: 0.01 K/UL (ref 0–0.12)
BILIRUB SERPL-MCNC: 0.3 MG/DL (ref 0.1–1.5)
BILIRUB UR QL STRIP.AUTO: NEGATIVE
BUN SERPL-MCNC: 14 MG/DL (ref 8–22)
C3 SERPL-MCNC: 123 MG/DL (ref 87–200)
C4 SERPL-MCNC: 18 MG/DL (ref 19–52)
CALCIUM SERPL-MCNC: 9 MG/DL (ref 8.5–10.5)
CHLORIDE SERPL-SCNC: 105 MMOL/L (ref 96–112)
CO2 SERPL-SCNC: 25 MMOL/L (ref 20–33)
COLOR UR: YELLOW
CREAT SERPL-MCNC: 0.77 MG/DL (ref 0.5–1.4)
CRP SERPL HS-MCNC: 5.01 MG/DL (ref 0–0.75)
EOSINOPHIL # BLD AUTO: 0.02 K/UL (ref 0–0.51)
EOSINOPHIL # BLD AUTO: 0.02 K/UL (ref 0–0.51)
EOSINOPHIL NFR BLD: 0.6 % (ref 0–6.9)
EOSINOPHIL NFR BLD: 0.6 % (ref 0–6.9)
EPI CELLS #/AREA URNS HPF: NEGATIVE /HPF
ERYTHROCYTE [DISTWIDTH] IN BLOOD BY AUTOMATED COUNT: 54.1 FL (ref 35.9–50)
ERYTHROCYTE [DISTWIDTH] IN BLOOD BY AUTOMATED COUNT: 54.8 FL (ref 35.9–50)
ERYTHROCYTE [SEDIMENTATION RATE] IN BLOOD BY WESTERGREN METHOD: 83 MM/HOUR (ref 0–20)
FOLATE SERPL-MCNC: 7.9 NG/ML
GLOBULIN SER CALC-MCNC: 5.9 G/DL (ref 1.9–3.5)
GLUCOSE SERPL-MCNC: 85 MG/DL (ref 65–99)
GLUCOSE UR STRIP.AUTO-MCNC: NEGATIVE MG/DL
HCT VFR BLD AUTO: 30.3 % (ref 37–47)
HCT VFR BLD AUTO: 30.5 % (ref 37–47)
HGB BLD-MCNC: 11.2 G/DL (ref 12–16)
HGB BLD-MCNC: 11.2 G/DL (ref 12–16)
HYALINE CASTS #/AREA URNS LPF: NORMAL /LPF
IMM GRANULOCYTES # BLD AUTO: 0.01 K/UL (ref 0–0.11)
IMM GRANULOCYTES # BLD AUTO: 0.01 K/UL (ref 0–0.11)
IMM GRANULOCYTES NFR BLD AUTO: 0.3 % (ref 0–0.9)
IMM GRANULOCYTES NFR BLD AUTO: 0.3 % (ref 0–0.9)
KETONES UR STRIP.AUTO-MCNC: NEGATIVE MG/DL
LEUKOCYTE ESTERASE UR QL STRIP.AUTO: ABNORMAL
LYMPHOCYTES # BLD AUTO: 1.54 K/UL (ref 1–4.8)
LYMPHOCYTES # BLD AUTO: 1.59 K/UL (ref 1–4.8)
LYMPHOCYTES NFR BLD: 44.5 % (ref 22–41)
LYMPHOCYTES NFR BLD: 46.2 % (ref 22–41)
MCH RBC QN AUTO: 37.1 PG (ref 27–33)
MCH RBC QN AUTO: 37.2 PG (ref 27–33)
MCHC RBC AUTO-ENTMCNC: 36.7 G/DL (ref 33.6–35)
MCHC RBC AUTO-ENTMCNC: 37 G/DL (ref 33.6–35)
MCV RBC AUTO: 100.7 FL (ref 81.4–97.8)
MCV RBC AUTO: 101 FL (ref 81.4–97.8)
MICRO URNS: ABNORMAL
MONOCYTES # BLD AUTO: 0.25 K/UL (ref 0–0.85)
MONOCYTES # BLD AUTO: 0.25 K/UL (ref 0–0.85)
MONOCYTES NFR BLD AUTO: 7.2 % (ref 0–13.4)
MONOCYTES NFR BLD AUTO: 7.3 % (ref 0–13.4)
NEUTROPHILS # BLD AUTO: 1.56 K/UL (ref 2–7.15)
NEUTROPHILS # BLD AUTO: 1.63 K/UL (ref 2–7.15)
NEUTROPHILS NFR BLD: 45.3 % (ref 44–72)
NEUTROPHILS NFR BLD: 47.1 % (ref 44–72)
NITRITE UR QL STRIP.AUTO: NEGATIVE
NRBC # BLD AUTO: 0 K/UL
NRBC # BLD AUTO: 0 K/UL
NRBC BLD-RTO: 0 /100 WBC
NRBC BLD-RTO: 0 /100 WBC
PH UR STRIP.AUTO: 7 [PH] (ref 5–8)
PLATELET # BLD AUTO: 219 K/UL (ref 164–446)
PLATELET # BLD AUTO: 219 K/UL (ref 164–446)
PMV BLD AUTO: 10.6 FL (ref 9–12.9)
PMV BLD AUTO: 10.6 FL (ref 9–12.9)
POTASSIUM SERPL-SCNC: 4.3 MMOL/L (ref 3.6–5.5)
PROT SERPL-MCNC: 9.5 G/DL (ref 6–8.2)
PROT UR QL STRIP: NEGATIVE MG/DL
RBC # BLD AUTO: 3.01 M/UL (ref 4.2–5.4)
RBC # BLD AUTO: 3.02 M/UL (ref 4.2–5.4)
RBC # URNS HPF: NORMAL /HPF
RBC UR QL AUTO: NEGATIVE
RHEUMATOID FACT SER IA-ACNC: 214 IU/ML (ref 0–14)
SODIUM SERPL-SCNC: 138 MMOL/L (ref 135–145)
SP GR UR STRIP.AUTO: 1.02
T4 SERPL-MCNC: 8.8 UG/DL (ref 4–12)
TSH SERPL DL<=0.005 MIU/L-ACNC: 1.18 UIU/ML (ref 0.38–5.33)
UROBILINOGEN UR STRIP.AUTO-MCNC: 0.2 MG/DL
WBC # BLD AUTO: 3.4 K/UL (ref 4.8–10.8)
WBC # BLD AUTO: 3.5 K/UL (ref 4.8–10.8)
WBC #/AREA URNS HPF: NORMAL /HPF

## 2020-02-22 PROCEDURE — 86140 C-REACTIVE PROTEIN: CPT

## 2020-02-22 PROCEDURE — 84436 ASSAY OF TOTAL THYROXINE: CPT

## 2020-02-22 PROCEDURE — 80053 COMPREHEN METABOLIC PANEL: CPT

## 2020-02-22 PROCEDURE — 85652 RBC SED RATE AUTOMATED: CPT

## 2020-02-22 PROCEDURE — 81001 URINALYSIS AUTO W/SCOPE: CPT

## 2020-02-22 PROCEDURE — 86431 RHEUMATOID FACTOR QUANT: CPT

## 2020-02-22 PROCEDURE — 85025 COMPLETE CBC W/AUTO DIFF WBC: CPT | Mod: 91

## 2020-02-22 PROCEDURE — 86160 COMPLEMENT ANTIGEN: CPT | Mod: 91

## 2020-02-22 PROCEDURE — 84443 ASSAY THYROID STIM HORMONE: CPT

## 2020-02-22 PROCEDURE — 36415 COLL VENOUS BLD VENIPUNCTURE: CPT

## 2020-02-22 PROCEDURE — 82746 ASSAY OF FOLIC ACID SERUM: CPT

## 2020-02-22 PROCEDURE — 85025 COMPLETE CBC W/AUTO DIFF WBC: CPT

## 2020-03-05 DIAGNOSIS — M79.7 FIBROMYALGIA: ICD-10-CM

## 2020-03-05 RX ORDER — PREGABALIN 75 MG/1
75 CAPSULE ORAL 2 TIMES DAILY
Qty: 60 CAP | Refills: 2 | OUTPATIENT
Start: 2020-03-05 | End: 2020-06-03

## 2020-03-11 ENCOUNTER — OFFICE VISIT (OUTPATIENT)
Dept: MEDICAL GROUP | Facility: PHYSICIAN GROUP | Age: 45
End: 2020-03-11
Payer: COMMERCIAL

## 2020-03-11 VITALS
BODY MASS INDEX: 29.32 KG/M2 | WEIGHT: 176 LBS | TEMPERATURE: 99 F | HEART RATE: 100 BPM | HEIGHT: 65 IN | SYSTOLIC BLOOD PRESSURE: 116 MMHG | DIASTOLIC BLOOD PRESSURE: 80 MMHG | RESPIRATION RATE: 12 BRPM | OXYGEN SATURATION: 98 %

## 2020-03-11 DIAGNOSIS — M54.2 NECK PAIN: ICD-10-CM

## 2020-03-11 DIAGNOSIS — N76.0 LABIAL INFECTION: ICD-10-CM

## 2020-03-11 DIAGNOSIS — M79.7 FIBROMYALGIA: ICD-10-CM

## 2020-03-11 PROCEDURE — 99213 OFFICE O/P EST LOW 20 MIN: CPT | Performed by: NURSE PRACTITIONER

## 2020-03-11 RX ORDER — PREGABALIN 75 MG/1
75 CAPSULE ORAL 2 TIMES DAILY
Qty: 180 CAP | Refills: 1 | Status: SHIPPED | OUTPATIENT
Start: 2020-03-11 | End: 2020-09-17 | Stop reason: SDUPTHER

## 2020-03-11 ASSESSMENT — PATIENT HEALTH QUESTIONNAIRE - PHQ9
1. LITTLE INTEREST OR PLEASURE IN DOING THINGS: MORE THAN HALF THE DAYS
8. MOVING OR SPEAKING SO SLOWLY THAT OTHER PEOPLE COULD HAVE NOTICED. OR THE OPPOSITE, BEING SO FIGETY OR RESTLESS THAT YOU HAVE BEEN MOVING AROUND A LOT MORE THAN USUAL: NOT AT ALL
4. FEELING TIRED OR HAVING LITTLE ENERGY: MORE THAN HALF THE DAYS
9. THOUGHTS THAT YOU WOULD BE BETTER OFF DEAD, OR OF HURTING YOURSELF: NOT AT ALL
2. FEELING DOWN, DEPRESSED, IRRITABLE, OR HOPELESS: MORE THAN HALF THE DAYS
3. TROUBLE FALLING OR STAYING ASLEEP OR SLEEPING TOO MUCH: NEARLY EVERY DAY
SUM OF ALL RESPONSES TO PHQ9 QUESTIONS 1 AND 2: 4
SUM OF ALL RESPONSES TO PHQ QUESTIONS 1-9: 18
5. POOR APPETITE OR OVEREATING: NEARLY EVERY DAY
6. FEELING BAD ABOUT YOURSELF - OR THAT YOU ARE A FAILURE OR HAVE LET YOURSELF OR YOUR FAMILY DOWN: NEARLY EVERY DAY
7. TROUBLE CONCENTRATING ON THINGS, SUCH AS READING THE NEWSPAPER OR WATCHING TELEVISION: NEARLY EVERY DAY

## 2020-03-11 ASSESSMENT — FIBROSIS 4 INDEX: FIB4 SCORE: 1.16
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
## 2020-03-11 NOTE — ASSESSMENT & PLAN NOTE
Patient reports lesion is resolved.  Lasted a couple weeks.  Completed antibiotic.  Did not schedule with gynecology as she never did get a call.  Contact information given to patient today if she would like to establish with gynecology.
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
## 2020-03-11 NOTE — ASSESSMENT & PLAN NOTE
This is a chronic health problem that is well controlled with current medications and lifestyle measures.  Improved with Lyrica.  Taking Lyrica 75 mg twice a day.  Working better than gabapentin.  Pain mostly occurs in neck and shoulders.  Reviewed with patient that this is a controlled substance and requires appointment for refills.

## 2020-03-11 NOTE — PATIENT INSTRUCTIONS
[facility]  [address] [facility] [address]  [place] [address]  [phone]

## 2020-04-02 ENCOUNTER — HOSPITAL ENCOUNTER (OUTPATIENT)
Dept: LAB | Facility: MEDICAL CENTER | Age: 45
End: 2020-04-02
Attending: INTERNAL MEDICINE
Payer: COMMERCIAL

## 2020-04-02 DIAGNOSIS — Z79.631 LONG TERM METHOTREXATE USER: ICD-10-CM

## 2020-04-02 DIAGNOSIS — M32.8 OTHER FORMS OF SYSTEMIC LUPUS ERYTHEMATOSUS, UNSPECIFIED ORGAN INVOLVEMENT STATUS (HCC): ICD-10-CM

## 2020-04-02 DIAGNOSIS — R74.8 ELEVATED ALKALINE PHOSPHATASE LEVEL: ICD-10-CM

## 2020-04-02 DIAGNOSIS — M35.00 SJOGREN'S SYNDROME, WITH UNSPECIFIED ORGAN INVOLVEMENT (HCC): ICD-10-CM

## 2020-04-02 LAB
ALBUMIN SERPL BCP-MCNC: 3.9 G/DL (ref 3.2–4.9)
ALBUMIN/GLOB SERPL: 0.8 G/DL
ALP SERPL-CCNC: 160 U/L (ref 30–99)
ALT SERPL-CCNC: 17 U/L (ref 2–50)
ANION GAP SERPL CALC-SCNC: 8 MMOL/L (ref 7–16)
AST SERPL-CCNC: 27 U/L (ref 12–45)
BASOPHILS # BLD AUTO: 0.4 % (ref 0–1.8)
BASOPHILS # BLD: 0.01 K/UL (ref 0–0.12)
BILIRUB SERPL-MCNC: 0.2 MG/DL (ref 0.1–1.5)
BUN SERPL-MCNC: 8 MG/DL (ref 8–22)
CALCIUM SERPL-MCNC: 8.8 MG/DL (ref 8.5–10.5)
CHLORIDE SERPL-SCNC: 104 MMOL/L (ref 96–112)
CO2 SERPL-SCNC: 23 MMOL/L (ref 20–33)
CREAT SERPL-MCNC: 0.84 MG/DL (ref 0.5–1.4)
EOSINOPHIL # BLD AUTO: 0.07 K/UL (ref 0–0.51)
EOSINOPHIL NFR BLD: 2.7 % (ref 0–6.9)
ERYTHROCYTE [DISTWIDTH] IN BLOOD BY AUTOMATED COUNT: 52.6 FL (ref 35.9–50)
GLOBULIN SER CALC-MCNC: 4.7 G/DL (ref 1.9–3.5)
GLUCOSE SERPL-MCNC: 73 MG/DL (ref 65–99)
HCT VFR BLD AUTO: 36.9 % (ref 37–47)
HGB BLD-MCNC: 12.3 G/DL (ref 12–16)
IMM GRANULOCYTES # BLD AUTO: 0 K/UL (ref 0–0.11)
IMM GRANULOCYTES NFR BLD AUTO: 0 % (ref 0–0.9)
LYMPHOCYTES # BLD AUTO: 1.07 K/UL (ref 1–4.8)
LYMPHOCYTES NFR BLD: 42 % (ref 22–41)
MCH RBC QN AUTO: 31.8 PG (ref 27–33)
MCHC RBC AUTO-ENTMCNC: 33.3 G/DL (ref 33.6–35)
MCV RBC AUTO: 95.3 FL (ref 81.4–97.8)
MONOCYTES # BLD AUTO: 0.18 K/UL (ref 0–0.85)
MONOCYTES NFR BLD AUTO: 7.1 % (ref 0–13.4)
NEUTROPHILS # BLD AUTO: 1.22 K/UL (ref 2–7.15)
NEUTROPHILS NFR BLD: 47.8 % (ref 44–72)
NRBC # BLD AUTO: 0 K/UL
NRBC BLD-RTO: 0 /100 WBC
PLATELET # BLD AUTO: 140 K/UL (ref 164–446)
PMV BLD AUTO: 11.3 FL (ref 9–12.9)
POTASSIUM SERPL-SCNC: 3.8 MMOL/L (ref 3.6–5.5)
PROT SERPL-MCNC: 8.6 G/DL (ref 6–8.2)
RBC # BLD AUTO: 3.87 M/UL (ref 4.2–5.4)
SODIUM SERPL-SCNC: 135 MMOL/L (ref 135–145)
WBC # BLD AUTO: 2.6 K/UL (ref 4.8–10.8)

## 2020-04-02 PROCEDURE — 85025 COMPLETE CBC W/AUTO DIFF WBC: CPT

## 2020-04-02 PROCEDURE — 36415 COLL VENOUS BLD VENIPUNCTURE: CPT

## 2020-04-02 PROCEDURE — 80053 COMPREHEN METABOLIC PANEL: CPT

## 2020-04-02 PROCEDURE — 83516 IMMUNOASSAY NONANTIBODY: CPT | Mod: 91

## 2020-04-04 LAB
MITOCHONDRIA M2 IGG SER-ACNC: 4 UNITS (ref 0–20)
SMA IGG SER-ACNC: 13 UNITS (ref 0–19)

## 2020-05-28 DIAGNOSIS — F41.9 ANXIETY DISORDER, UNSPECIFIED TYPE: ICD-10-CM

## 2020-05-28 RX ORDER — LORAZEPAM 1 MG/1
0.5 TABLET ORAL 2 TIMES DAILY PRN
Qty: 30 TAB | Refills: 0 | Status: SHIPPED | OUTPATIENT
Start: 2020-05-28 | End: 2020-06-27

## 2020-05-29 ENCOUNTER — TELEMEDICINE (OUTPATIENT)
Dept: BEHAVIORAL HEALTH | Facility: CLINIC | Age: 45
End: 2020-05-29
Payer: COMMERCIAL

## 2020-05-29 VITALS — BODY MASS INDEX: 30.73 KG/M2 | HEIGHT: 64 IN | WEIGHT: 180 LBS

## 2020-05-29 DIAGNOSIS — F33.1 MODERATE EPISODE OF RECURRENT MAJOR DEPRESSIVE DISORDER (HCC): ICD-10-CM

## 2020-05-29 DIAGNOSIS — F41.9 ANXIETY DISORDER, UNSPECIFIED TYPE: ICD-10-CM

## 2020-05-29 DIAGNOSIS — F43.10 POSTTRAUMATIC STRESS DISORDER: ICD-10-CM

## 2020-05-29 PROCEDURE — 99214 OFFICE O/P EST MOD 30 MIN: CPT | Mod: 95,CR | Performed by: PSYCHIATRY & NEUROLOGY

## 2020-05-29 PROCEDURE — 96127 BRIEF EMOTIONAL/BEHAV ASSMT: CPT | Mod: 95,CR | Performed by: PSYCHIATRY & NEUROLOGY

## 2020-05-29 RX ORDER — TRAZODONE HYDROCHLORIDE 100 MG/1
100 TABLET ORAL NIGHTLY PRN
Qty: 90 TAB | Refills: 1 | Status: SHIPPED | OUTPATIENT
Start: 2020-05-29 | End: 2020-12-17 | Stop reason: SDUPTHER

## 2020-05-29 RX ORDER — LAMOTRIGINE 25 MG/1
50 TABLET ORAL DAILY
Qty: 180 TAB | Refills: 1 | Status: SHIPPED | OUTPATIENT
Start: 2020-05-29 | End: 2020-08-03

## 2020-05-29 RX ORDER — ARIPIPRAZOLE 5 MG/1
5 TABLET ORAL DAILY
Qty: 90 TAB | Refills: 1 | Status: SHIPPED | OUTPATIENT
Start: 2020-05-29 | End: 2020-12-17 | Stop reason: SDUPTHER

## 2020-05-29 ASSESSMENT — ANXIETY QUESTIONNAIRES
5. BEING SO RESTLESS THAT IT IS HARD TO SIT STILL: NEARLY EVERY DAY
6. BECOMING EASILY ANNOYED OR IRRITABLE: NEARLY EVERY DAY
2. NOT BEING ABLE TO STOP OR CONTROL WORRYING: MORE THAN HALF THE DAYS
3. WORRYING TOO MUCH ABOUT DIFFERENT THINGS: NEARLY EVERY DAY
4. TROUBLE RELAXING: MORE THAN HALF THE DAYS
GAD7 TOTAL SCORE: 18
7. FEELING AFRAID AS IF SOMETHING AWFUL MIGHT HAPPEN: MORE THAN HALF THE DAYS
1. FEELING NERVOUS, ANXIOUS, OR ON EDGE: NEARLY EVERY DAY

## 2020-05-29 ASSESSMENT — FIBROSIS 4 INDEX: FIB4 SCORE: 2.01

## 2020-05-29 ASSESSMENT — PATIENT HEALTH QUESTIONNAIRE - PHQ9
CLINICAL INTERPRETATION OF PHQ2 SCORE: 3
5. POOR APPETITE OR OVEREATING: 1 - SEVERAL DAYS
SUM OF ALL RESPONSES TO PHQ QUESTIONS 1-9: 15
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
## 2020-06-04 ENCOUNTER — HOSPITAL ENCOUNTER (OUTPATIENT)
Dept: LAB | Facility: MEDICAL CENTER | Age: 45
End: 2020-06-04
Attending: INTERNAL MEDICINE
Payer: COMMERCIAL

## 2020-06-04 DIAGNOSIS — M35.00 SJOGREN'S SYNDROME, WITH UNSPECIFIED ORGAN INVOLVEMENT (HCC): ICD-10-CM

## 2020-06-04 DIAGNOSIS — M32.8 OTHER FORMS OF SYSTEMIC LUPUS ERYTHEMATOSUS, UNSPECIFIED ORGAN INVOLVEMENT STATUS (HCC): ICD-10-CM

## 2020-06-04 LAB
ALBUMIN SERPL BCP-MCNC: 3.8 G/DL (ref 3.2–4.9)
ALBUMIN/GLOB SERPL: 0.9 G/DL
ALP SERPL-CCNC: 118 U/L (ref 30–99)
ALT SERPL-CCNC: 27 U/L (ref 2–50)
ANION GAP SERPL CALC-SCNC: 4 MMOL/L (ref 7–16)
APPEARANCE UR: CLEAR
AST SERPL-CCNC: 31 U/L (ref 12–45)
BACTERIA #/AREA URNS HPF: ABNORMAL /HPF
BILIRUB SERPL-MCNC: 0.2 MG/DL (ref 0.1–1.5)
BILIRUB UR QL STRIP.AUTO: NEGATIVE
BUN SERPL-MCNC: 8 MG/DL (ref 8–22)
C3 SERPL-MCNC: 79.4 MG/DL (ref 87–200)
C4 SERPL-MCNC: 11 MG/DL (ref 19–52)
CALCIUM SERPL-MCNC: 9.3 MG/DL (ref 8.5–10.5)
CHLORIDE SERPL-SCNC: 100 MMOL/L (ref 96–112)
CO2 SERPL-SCNC: 25 MMOL/L (ref 20–33)
COLOR UR: YELLOW
CREAT SERPL-MCNC: 0.71 MG/DL (ref 0.5–1.4)
CRP SERPL HS-MCNC: 0.65 MG/DL (ref 0–0.75)
EPI CELLS #/AREA URNS HPF: ABNORMAL /HPF
ERYTHROCYTE [SEDIMENTATION RATE] IN BLOOD BY WESTERGREN METHOD: 37 MM/HOUR (ref 0–20)
GLOBULIN SER CALC-MCNC: 4.3 G/DL (ref 1.9–3.5)
GLUCOSE SERPL-MCNC: 85 MG/DL (ref 65–99)
GLUCOSE UR STRIP.AUTO-MCNC: NEGATIVE MG/DL
HYALINE CASTS #/AREA URNS LPF: ABNORMAL /LPF
KETONES UR STRIP.AUTO-MCNC: NEGATIVE MG/DL
LEUKOCYTE ESTERASE UR QL STRIP.AUTO: ABNORMAL
MICRO URNS: ABNORMAL
NITRITE UR QL STRIP.AUTO: NEGATIVE
PH UR STRIP.AUTO: 6 [PH] (ref 5–8)
POTASSIUM SERPL-SCNC: 3.9 MMOL/L (ref 3.6–5.5)
PROT SERPL-MCNC: 8.1 G/DL (ref 6–8.2)
PROT UR QL STRIP: NEGATIVE MG/DL
RBC # URNS HPF: ABNORMAL /HPF
RBC UR QL AUTO: NEGATIVE
SODIUM SERPL-SCNC: 129 MMOL/L (ref 135–145)
SP GR UR STRIP.AUTO: 1.01
UROBILINOGEN UR STRIP.AUTO-MCNC: 0.2 MG/DL
WBC #/AREA URNS HPF: ABNORMAL /HPF

## 2020-06-04 PROCEDURE — 86160 COMPLEMENT ANTIGEN: CPT

## 2020-06-04 PROCEDURE — 81001 URINALYSIS AUTO W/SCOPE: CPT

## 2020-06-04 PROCEDURE — 85652 RBC SED RATE AUTOMATED: CPT

## 2020-06-04 PROCEDURE — 36415 COLL VENOUS BLD VENIPUNCTURE: CPT

## 2020-06-04 PROCEDURE — 80053 COMPREHEN METABOLIC PANEL: CPT

## 2020-06-04 PROCEDURE — 86140 C-REACTIVE PROTEIN: CPT

## 2020-06-18 ENCOUNTER — APPOINTMENT (OUTPATIENT)
Dept: RADIOLOGY | Facility: IMAGING CENTER | Age: 45
End: 2020-06-18
Attending: NURSE PRACTITIONER
Payer: COMMERCIAL

## 2020-06-18 ENCOUNTER — APPOINTMENT (OUTPATIENT)
Dept: RADIOLOGY | Facility: IMAGING CENTER | Age: 45
End: 2020-06-18
Attending: INTERNAL MEDICINE
Payer: COMMERCIAL

## 2020-06-18 ENCOUNTER — APPOINTMENT (OUTPATIENT)
Dept: URGENT CARE | Facility: CLINIC | Age: 45
End: 2020-06-18
Payer: COMMERCIAL

## 2020-06-18 ENCOUNTER — HOSPITAL ENCOUNTER (OUTPATIENT)
Dept: LAB | Facility: MEDICAL CENTER | Age: 45
End: 2020-06-18
Attending: INTERNAL MEDICINE
Payer: COMMERCIAL

## 2020-06-18 DIAGNOSIS — M32.8 OTHER FORMS OF SYSTEMIC LUPUS ERYTHEMATOSUS, UNSPECIFIED ORGAN INVOLVEMENT STATUS (HCC): ICD-10-CM

## 2020-06-18 DIAGNOSIS — M35.00 SJOGREN'S SYNDROME, WITH UNSPECIFIED ORGAN INVOLVEMENT (HCC): ICD-10-CM

## 2020-06-18 DIAGNOSIS — Z79.899 HIGH RISK MEDICATION USE: ICD-10-CM

## 2020-06-18 DIAGNOSIS — M54.2 NECK PAIN: ICD-10-CM

## 2020-06-18 LAB
BASOPHILS # BLD AUTO: 0.2 % (ref 0–1.8)
BASOPHILS # BLD: 0.01 K/UL (ref 0–0.12)
EOSINOPHIL # BLD AUTO: 0 K/UL (ref 0–0.51)
EOSINOPHIL NFR BLD: 0 % (ref 0–6.9)
ERYTHROCYTE [DISTWIDTH] IN BLOOD BY AUTOMATED COUNT: 53.1 FL (ref 35.9–50)
HCT VFR BLD AUTO: 39.9 % (ref 37–47)
HGB BLD-MCNC: 13.4 G/DL (ref 12–16)
IMM GRANULOCYTES # BLD AUTO: 0.01 K/UL (ref 0–0.11)
IMM GRANULOCYTES NFR BLD AUTO: 0.2 % (ref 0–0.9)
LYMPHOCYTES # BLD AUTO: 1.07 K/UL (ref 1–4.8)
LYMPHOCYTES NFR BLD: 19.5 % (ref 22–41)
MCH RBC QN AUTO: 33.4 PG (ref 27–33)
MCHC RBC AUTO-ENTMCNC: 33.6 G/DL (ref 33.6–35)
MCV RBC AUTO: 99.5 FL (ref 81.4–97.8)
MONOCYTES # BLD AUTO: 0.25 K/UL (ref 0–0.85)
MONOCYTES NFR BLD AUTO: 4.6 % (ref 0–13.4)
NEUTROPHILS # BLD AUTO: 4.15 K/UL (ref 2–7.15)
NEUTROPHILS NFR BLD: 75.5 % (ref 44–72)
NRBC # BLD AUTO: 0 K/UL
NRBC BLD-RTO: 0 /100 WBC
PLATELET # BLD AUTO: 193 K/UL (ref 164–446)
PMV BLD AUTO: 10.3 FL (ref 9–12.9)
RBC # BLD AUTO: 4.01 M/UL (ref 4.2–5.4)
WBC # BLD AUTO: 5.5 K/UL (ref 4.8–10.8)

## 2020-06-18 PROCEDURE — 85025 COMPLETE CBC W/AUTO DIFF WBC: CPT

## 2020-06-18 PROCEDURE — 86480 TB TEST CELL IMMUN MEASURE: CPT

## 2020-06-18 PROCEDURE — 71046 X-RAY EXAM CHEST 2 VIEWS: CPT | Mod: TC | Performed by: PHYSICIAN ASSISTANT

## 2020-06-18 PROCEDURE — 82657 ENZYME CELL ACTIVITY: CPT

## 2020-06-18 PROCEDURE — 72040 X-RAY EXAM NECK SPINE 2-3 VW: CPT | Mod: TC | Performed by: PHYSICIAN ASSISTANT

## 2020-06-18 PROCEDURE — 36415 COLL VENOUS BLD VENIPUNCTURE: CPT

## 2020-06-19 LAB
GAMMA INTERFERON BACKGROUND BLD IA-ACNC: 0.02 IU/ML
M TB IFN-G BLD-IMP: NEGATIVE
M TB IFN-G CD4+ BCKGRND COR BLD-ACNC: 0.19 IU/ML
MITOGEN IGNF BCKGRD COR BLD-ACNC: >10 IU/ML
QFT TB2 - NIL TBQ2: 0.2 IU/ML

## 2020-06-22 LAB — TPMT BLD-CCNC: 31.4 U/ML (ref 24–44)

## 2020-08-03 RX ORDER — LAMOTRIGINE 100 MG/1
100 TABLET ORAL DAILY
Qty: 90 TAB | Refills: 1 | Status: SHIPPED | OUTPATIENT
Start: 2020-08-03 | End: 2020-12-17 | Stop reason: SDUPTHER

## 2020-09-17 ENCOUNTER — TELEMEDICINE (OUTPATIENT)
Dept: MEDICAL GROUP | Facility: PHYSICIAN GROUP | Age: 45
End: 2020-09-17
Payer: COMMERCIAL

## 2020-09-17 VITALS — WEIGHT: 183 LBS | HEIGHT: 64 IN | BODY MASS INDEX: 31.24 KG/M2 | RESPIRATION RATE: 16 BRPM

## 2020-09-17 DIAGNOSIS — M79.7 FIBROMYALGIA: ICD-10-CM

## 2020-09-17 PROBLEM — N76.0 LABIAL INFECTION: Status: RESOLVED | Noted: 2019-12-18 | Resolved: 2020-09-17

## 2020-09-17 PROBLEM — R23.3 EASY BRUISING: Status: RESOLVED | Noted: 2018-07-06 | Resolved: 2020-09-17

## 2020-09-17 PROBLEM — R00.0 RAPID RESTING HEART RATE: Status: RESOLVED | Noted: 2018-07-06 | Resolved: 2020-09-17

## 2020-09-17 PROCEDURE — 99213 OFFICE O/P EST LOW 20 MIN: CPT | Mod: 95,CR | Performed by: NURSE PRACTITIONER

## 2020-09-17 RX ORDER — CYCLOBENZAPRINE HCL 10 MG
10 TABLET ORAL
Qty: 30 TAB | Refills: 6 | Status: SHIPPED | OUTPATIENT
Start: 2020-09-17 | End: 2021-03-23 | Stop reason: SDUPTHER

## 2020-09-17 RX ORDER — PREGABALIN 100 MG/1
100 CAPSULE ORAL 2 TIMES DAILY
Qty: 180 CAP | Refills: 1 | Status: SHIPPED | OUTPATIENT
Start: 2020-09-17 | End: 2021-03-23 | Stop reason: SDUPTHER

## 2020-09-17 RX ORDER — ASPIRIN 81 MG/1
TABLET, CHEWABLE ORAL
COMMUNITY
Start: 2019-12-06 | End: 2021-03-23

## 2020-09-17 ASSESSMENT — FIBROSIS 4 INDEX: FIB4 SCORE: 1.33

## 2020-09-17 NOTE — ASSESSMENT & PLAN NOTE
As a means of avoiding spread of COVID-19, this visit is being conducted by video.    This is a chronic health problem that is well controlled with current medications and lifestyle measures.  Taking Lyrica 75 mg twice a day.  Tolerating medication.  Does report mild lower extremity edema at times that are resolved with compression hose.  Was working really well and better than gabapentin.  Does report increase in fibromyalgia pain in legs, neck, back.  Thinks pain has increased due to increased stress at work.  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  Patient asking if can increase dose to 100 mg.

## 2020-09-17 NOTE — PROGRESS NOTES
Virtual Visit: Established Patient   This visit was conducted via Zoom using secure and encrypted videoconferencing technology. The patient was in a private location in the state of [place].    The patient's identity was confirmed and verbal consent was obtained for this virtual visit.    Subjective:   CC:   Chief Complaint   Patient presents with   • Medication Refill     fibromyalgia       Augusta Allred is a 44 y.o. female presenting for evaluation and management of:    Fibromyalgia  As a means of avoiding spread of COVID-19, this visit is being conducted by video.    This is a chronic health problem that is well controlled with current medications and lifestyle measures.  Taking Lyrica 75 mg twice a day.  Tolerating medication.  Does report mild lower extremity edema at times that are resolved with compression hose.  Was working really well and better than gabapentin.  Does report increase in fibromyalgia pain in legs, neck, back.  Thinks pain has increased due to increased stress at work.  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  Patient asking if can increase dose to 100 mg.      ROS   Denies any recent fevers or chills. No nausea or vomiting. No chest pains or shortness of breath.     Allergies   Allergen Reactions   • Codeine        Current medicines (including changes today)  Current Outpatient Medications   Medication Sig Dispense Refill   • aspirin (AHMET LOW DOSE) 81 MG Chew Tab chewable tablet      • cyclobenzaprine (FLEXERIL) 10 MG Tab Take 1 Tab by mouth 1 time daily as needed for Muscle Spasms. 30 Tab 6   • pregabalin (LYRICA) 100 MG Cap Take 1 Cap by mouth 2 times a day for 180 days. 180 Cap 1   • hydroxychloroquine (PLAQUENIL) 200 MG Tab Take 2 Tabs by mouth every day. 180 Tab 1   • methotrexate 2.5 MG Tab TAKE 4 TABS BY MOUTH EVERY 7 DAYS 48 Tab 0   • lamoTRIgine (LAMICTAL) 100 MG Tab Take 1 Tab by mouth every day. 90  Tab 1   • Belimumab (BENLYSTA) 200 MG/ML Solution Auto-injector Inject 200 mg as instructed every 7 days. 12 mL 3   • aripiprazole (ABILIFY) 5 MG tablet Take 1 Tab by mouth every day. 90 Tab 1   • traZODone (DESYREL) 100 MG Tab Take 1 Tab by mouth at bedtime as needed. 90 Tab 1   • fluticasone (FLONASE) 50 MCG/ACT nasal spray Spray 1 Spray in nose every day.     • folic acid (FOLVITE) 1 MG Tab Take 1 Tab by mouth every day. 90 Tab 3   • aspirin EC (ECOTRIN) 81 MG Tablet Delayed Response Take 81 mg by mouth every day.     • DiphenhydrAMINE HCl (BENADRYL ALLERGY PO) Take  by mouth.       No current facility-administered medications for this visit.        Patient Active Problem List    Diagnosis Date Noted   • Sjogren's syndrome ([facility]) 12/05/2019   • Neck pain 12/05/2019   • Moderate episode of recurrent major depressive disorder ([facility]) 10/30/2018   • Anxiety disorder 10/30/2018   • SLE (systemic lupus erythematosus related syndrome) ([facility]) 10/20/2018   • Elevated serum alkaline phosphatase level 07/10/2018   • Fibromyalgia 07/06/2018   • Seasonal allergies 07/06/2018   • Other insomnia 07/06/2018   • PTSD (post-traumatic stress disorder) 07/06/2018   • Tinea pedis of both feet 07/06/2018       Family History   Problem Relation Age of Onset   • Cancer Mother         uterine   • Other Mother         celiac   • Depression Father    • Lung Disease Father    • Bladder cancer Father    • Other Father         drug addict   • Heart Disease Sister    • Other Sister         mental retardation   • Bipolar disorder Brother    • Other Brother         addiction   • Breast Cancer Maternal Grandmother    • Other Maternal Grandmother         stroke   • Prostate cancer Maternal Grandfather    • Heart Disease Paternal Grandmother    • Heart Disease Paternal Grandfather    • Other Brother         paranoid schizophrenia, drug addiction       She  has a past medical history of Depression and Fibromyalgia.  She  has a past surgical history  "that includes knee arthroscopy; primary c section; tubal ligation; and dental extraction(s) (1996).       Objective:   Resp 16   Ht 1.626 m (5' 4\")   Wt 83 kg (183 lb)   BMI 31.41 kg/m²     Physical Exam:  Constitutional: Alert, no distress, well-groomed.  Skin: No rashes in visible areas.  Eye: Round. Conjunctiva clear, lids normal. No icterus.   ENMT: Lips pink without lesions, good dentition, moist mucous membranes. Phonation normal.  Neck: No masses, no thyromegaly. Moves freely without pain.  Respiratory: Unlabored respiratory effort, no cough or audible wheeze  Psych: Alert and oriented x3, normal affect and mood.       Assessment and Plan:   The following treatment plan was discussed:     1. Fibromyalgia  Increased pain.  Will increase dose of Lyrica to 100 mg.  Continue taking it twice a day.  Continue with Flexeril as needed.   reviewed and consistent.  - cyclobenzaprine (FLEXERIL) 10 MG Tab; Take 1 Tab by mouth 1 time daily as needed for Muscle Spasms.  Dispense: 30 Tab; Refill: 6  - pregabalin (LYRICA) 100 MG Cap; Take 1 Cap by mouth 2 times a day for 180 days.  Dispense: 180 Cap; Refill: 1    Follow-up: Return in about 6 months (around 3/17/2021) for fibromyalgia.           "

## 2020-12-17 ENCOUNTER — TELEMEDICINE (OUTPATIENT)
Dept: BEHAVIORAL HEALTH | Facility: CLINIC | Age: 45
End: 2020-12-17
Payer: COMMERCIAL

## 2020-12-17 VITALS — HEIGHT: 64 IN | WEIGHT: 184 LBS | BODY MASS INDEX: 31.41 KG/M2

## 2020-12-17 DIAGNOSIS — F41.9 ANXIETY DISORDER, UNSPECIFIED TYPE: ICD-10-CM

## 2020-12-17 DIAGNOSIS — F43.10 POSTTRAUMATIC STRESS DISORDER: ICD-10-CM

## 2020-12-17 DIAGNOSIS — F33.1 MODERATE EPISODE OF RECURRENT MAJOR DEPRESSIVE DISORDER (HCC): ICD-10-CM

## 2020-12-17 PROCEDURE — 90833 PSYTX W PT W E/M 30 MIN: CPT | Mod: 95,CR | Performed by: PSYCHIATRY & NEUROLOGY

## 2020-12-17 PROCEDURE — 99214 OFFICE O/P EST MOD 30 MIN: CPT | Mod: 95,CR | Performed by: PSYCHIATRY & NEUROLOGY

## 2020-12-17 RX ORDER — LORAZEPAM 0.5 MG/1
0.5 TABLET ORAL 2 TIMES DAILY PRN
Qty: 30 TAB | Refills: 4 | Status: SHIPPED | OUTPATIENT
Start: 2020-12-17 | End: 2021-03-17

## 2020-12-17 RX ORDER — ARIPIPRAZOLE 5 MG/1
5 TABLET ORAL DAILY
Qty: 90 TAB | Refills: 4 | Status: SHIPPED | OUTPATIENT
Start: 2020-12-17 | End: 2021-09-22 | Stop reason: SDUPTHER

## 2020-12-17 RX ORDER — LAMOTRIGINE 100 MG/1
100 TABLET ORAL DAILY
Qty: 90 TAB | Refills: 4 | Status: SHIPPED | OUTPATIENT
Start: 2020-12-17 | End: 2021-09-22 | Stop reason: SDUPTHER

## 2020-12-17 RX ORDER — TRAZODONE HYDROCHLORIDE 100 MG/1
100 TABLET ORAL NIGHTLY PRN
Qty: 90 TAB | Refills: 4 | Status: SHIPPED | OUTPATIENT
Start: 2020-12-17 | End: 2021-09-22 | Stop reason: SDUPTHER

## 2020-12-17 ASSESSMENT — ANXIETY QUESTIONNAIRES
5. BEING SO RESTLESS THAT IT IS HARD TO SIT STILL: SEVERAL DAYS
GAD7 TOTAL SCORE: 16
2. NOT BEING ABLE TO STOP OR CONTROL WORRYING: NEARLY EVERY DAY
6. BECOMING EASILY ANNOYED OR IRRITABLE: NEARLY EVERY DAY
7. FEELING AFRAID AS IF SOMETHING AWFUL MIGHT HAPPEN: MORE THAN HALF THE DAYS
1. FEELING NERVOUS, ANXIOUS, OR ON EDGE: MORE THAN HALF THE DAYS
3. WORRYING TOO MUCH ABOUT DIFFERENT THINGS: NEARLY EVERY DAY
4. TROUBLE RELAXING: MORE THAN HALF THE DAYS

## 2020-12-17 ASSESSMENT — PATIENT HEALTH QUESTIONNAIRE - PHQ9
CLINICAL INTERPRETATION OF PHQ2 SCORE: 6
5. POOR APPETITE OR OVEREATING: 3 - NEARLY EVERY DAY
SUM OF ALL RESPONSES TO PHQ QUESTIONS 1-9: 22

## 2020-12-17 ASSESSMENT — FIBROSIS 4 INDEX: FIB4 SCORE: 1.36
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
## 2021-01-20 ENCOUNTER — HOSPITAL ENCOUNTER (OUTPATIENT)
Dept: LAB | Facility: MEDICAL CENTER | Age: 46
End: 2021-01-20
Attending: INTERNAL MEDICINE
Payer: COMMERCIAL

## 2021-01-20 DIAGNOSIS — M32.8 OTHER FORMS OF SYSTEMIC LUPUS ERYTHEMATOSUS, UNSPECIFIED ORGAN INVOLVEMENT STATUS (HCC): ICD-10-CM

## 2021-01-20 DIAGNOSIS — Z79.899 HIGH RISK MEDICATION USE: ICD-10-CM

## 2021-01-20 DIAGNOSIS — M35.00 SJOGREN'S SYNDROME, WITH UNSPECIFIED ORGAN INVOLVEMENT (HCC): ICD-10-CM

## 2021-01-20 DIAGNOSIS — Z79.899 LONG-TERM USE OF PLAQUENIL: ICD-10-CM

## 2021-01-20 LAB
ALBUMIN SERPL BCP-MCNC: 4.2 G/DL (ref 3.2–4.9)
ALBUMIN/GLOB SERPL: 1 G/DL
ALP SERPL-CCNC: 115 U/L (ref 30–99)
ALT SERPL-CCNC: 27 U/L (ref 2–50)
ANION GAP SERPL CALC-SCNC: 9 MMOL/L (ref 7–16)
APPEARANCE UR: CLEAR
AST SERPL-CCNC: 31 U/L (ref 12–45)
BASOPHILS # BLD AUTO: 0.2 % (ref 0–1.8)
BASOPHILS # BLD: 0.01 K/UL (ref 0–0.12)
BILIRUB SERPL-MCNC: 0.2 MG/DL (ref 0.1–1.5)
BILIRUB UR QL STRIP.AUTO: NEGATIVE
BUN SERPL-MCNC: 7 MG/DL (ref 8–22)
C3 SERPL-MCNC: 98.4 MG/DL (ref 87–200)
C4 SERPL-MCNC: 10.8 MG/DL (ref 19–52)
CALCIUM SERPL-MCNC: 9.1 MG/DL (ref 8.5–10.5)
CHLORIDE SERPL-SCNC: 106 MMOL/L (ref 96–112)
CO2 SERPL-SCNC: 26 MMOL/L (ref 20–33)
COLOR UR: YELLOW
CREAT SERPL-MCNC: 0.69 MG/DL (ref 0.5–1.4)
CRP SERPL HS-MCNC: 1.31 MG/DL (ref 0–0.75)
EOSINOPHIL # BLD AUTO: 0.06 K/UL (ref 0–0.51)
EOSINOPHIL NFR BLD: 1 % (ref 0–6.9)
ERYTHROCYTE [DISTWIDTH] IN BLOOD BY AUTOMATED COUNT: 49.6 FL (ref 35.9–50)
ERYTHROCYTE [SEDIMENTATION RATE] IN BLOOD BY WESTERGREN METHOD: 78 MM/HOUR (ref 0–20)
GLOBULIN SER CALC-MCNC: 4.1 G/DL (ref 1.9–3.5)
GLUCOSE SERPL-MCNC: 77 MG/DL (ref 65–99)
GLUCOSE UR STRIP.AUTO-MCNC: NEGATIVE MG/DL
HCT VFR BLD AUTO: 42.6 % (ref 37–47)
HGB BLD-MCNC: 14.4 G/DL (ref 12–16)
IMM GRANULOCYTES # BLD AUTO: 0.02 K/UL (ref 0–0.11)
IMM GRANULOCYTES NFR BLD AUTO: 0.3 % (ref 0–0.9)
KETONES UR STRIP.AUTO-MCNC: NEGATIVE MG/DL
LEUKOCYTE ESTERASE UR QL STRIP.AUTO: NEGATIVE
LYMPHOCYTES # BLD AUTO: 1.31 K/UL (ref 1–4.8)
LYMPHOCYTES NFR BLD: 21.3 % (ref 22–41)
MCH RBC QN AUTO: 34.2 PG (ref 27–33)
MCHC RBC AUTO-ENTMCNC: 33.8 G/DL (ref 33.6–35)
MCV RBC AUTO: 101.2 FL (ref 81.4–97.8)
MICRO URNS: NORMAL
MONOCYTES # BLD AUTO: 0.39 K/UL (ref 0–0.85)
MONOCYTES NFR BLD AUTO: 6.3 % (ref 0–13.4)
NEUTROPHILS # BLD AUTO: 4.37 K/UL (ref 2–7.15)
NEUTROPHILS NFR BLD: 70.9 % (ref 44–72)
NITRITE UR QL STRIP.AUTO: NEGATIVE
NRBC # BLD AUTO: 0 K/UL
NRBC BLD-RTO: 0 /100 WBC
PH UR STRIP.AUTO: 6 [PH] (ref 5–8)
PLATELET # BLD AUTO: 162 K/UL (ref 164–446)
PMV BLD AUTO: 10.6 FL (ref 9–12.9)
POTASSIUM SERPL-SCNC: 4.1 MMOL/L (ref 3.6–5.5)
PROT SERPL-MCNC: 8.3 G/DL (ref 6–8.2)
PROT UR QL STRIP: NEGATIVE MG/DL
RBC # BLD AUTO: 4.21 M/UL (ref 4.2–5.4)
RBC UR QL AUTO: NEGATIVE
SODIUM SERPL-SCNC: 141 MMOL/L (ref 135–145)
SP GR UR STRIP.AUTO: 1.01
UROBILINOGEN UR STRIP.AUTO-MCNC: 0.2 MG/DL
WBC # BLD AUTO: 6.2 K/UL (ref 4.8–10.8)

## 2021-01-20 PROCEDURE — 86160 COMPLEMENT ANTIGEN: CPT | Mod: 91

## 2021-01-20 PROCEDURE — 85652 RBC SED RATE AUTOMATED: CPT

## 2021-01-20 PROCEDURE — 85025 COMPLETE CBC W/AUTO DIFF WBC: CPT

## 2021-01-20 PROCEDURE — 80053 COMPREHEN METABOLIC PANEL: CPT

## 2021-01-20 PROCEDURE — 81003 URINALYSIS AUTO W/O SCOPE: CPT

## 2021-01-20 PROCEDURE — 36415 COLL VENOUS BLD VENIPUNCTURE: CPT

## 2021-01-20 PROCEDURE — 86140 C-REACTIVE PROTEIN: CPT

## 2021-03-23 ENCOUNTER — OFFICE VISIT (OUTPATIENT)
Dept: MEDICAL GROUP | Facility: PHYSICIAN GROUP | Age: 46
End: 2021-03-23
Payer: COMMERCIAL

## 2021-03-23 VITALS
HEART RATE: 98 BPM | DIASTOLIC BLOOD PRESSURE: 72 MMHG | OXYGEN SATURATION: 99 % | WEIGHT: 204 LBS | SYSTOLIC BLOOD PRESSURE: 120 MMHG | HEIGHT: 64 IN | RESPIRATION RATE: 12 BRPM | BODY MASS INDEX: 34.83 KG/M2 | TEMPERATURE: 99.2 F

## 2021-03-23 DIAGNOSIS — R51.9 CHRONIC INTRACTABLE HEADACHE, UNSPECIFIED HEADACHE TYPE: ICD-10-CM

## 2021-03-23 DIAGNOSIS — F17.210 CIGARETTE NICOTINE DEPENDENCE WITHOUT COMPLICATION: ICD-10-CM

## 2021-03-23 DIAGNOSIS — M79.7 FIBROMYALGIA: ICD-10-CM

## 2021-03-23 DIAGNOSIS — M54.2 NECK PAIN: ICD-10-CM

## 2021-03-23 DIAGNOSIS — N92.6 IRREGULAR MENSES: ICD-10-CM

## 2021-03-23 DIAGNOSIS — F33.1 MODERATE EPISODE OF RECURRENT MAJOR DEPRESSIVE DISORDER (HCC): ICD-10-CM

## 2021-03-23 DIAGNOSIS — N95.1 PERI-MENOPAUSAL: ICD-10-CM

## 2021-03-23 DIAGNOSIS — Z12.31 ENCOUNTER FOR SCREENING MAMMOGRAM FOR BREAST CANCER: ICD-10-CM

## 2021-03-23 DIAGNOSIS — M32.9 SLE (SYSTEMIC LUPUS ERYTHEMATOSUS RELATED SYNDROME) (HCC): ICD-10-CM

## 2021-03-23 DIAGNOSIS — R53.83 FATIGUE, UNSPECIFIED TYPE: ICD-10-CM

## 2021-03-23 DIAGNOSIS — R74.8 ELEVATED SERUM ALKALINE PHOSPHATASE LEVEL: ICD-10-CM

## 2021-03-23 DIAGNOSIS — G89.29 CHRONIC INTRACTABLE HEADACHE, UNSPECIFIED HEADACHE TYPE: ICD-10-CM

## 2021-03-23 PROBLEM — B35.3 TINEA PEDIS OF BOTH FEET: Status: RESOLVED | Noted: 2018-07-06 | Resolved: 2021-03-23

## 2021-03-23 PROCEDURE — 99214 OFFICE O/P EST MOD 30 MIN: CPT | Performed by: NURSE PRACTITIONER

## 2021-03-23 RX ORDER — CYCLOBENZAPRINE HCL 10 MG
10 TABLET ORAL
Qty: 30 TABLET | Refills: 6 | Status: SHIPPED | OUTPATIENT
Start: 2021-03-23 | End: 2021-10-06 | Stop reason: SDUPTHER

## 2021-03-23 RX ORDER — LIFITEGRAST 50 MG/ML
SOLUTION/ DROPS OPHTHALMIC 2 TIMES DAILY
COMMUNITY

## 2021-03-23 RX ORDER — PREGABALIN 100 MG/1
100 CAPSULE ORAL 2 TIMES DAILY
Qty: 180 CAPSULE | Refills: 1 | Status: SHIPPED | OUTPATIENT
Start: 2021-03-23 | End: 2021-10-06 | Stop reason: SDUPTHER

## 2021-03-23 ASSESSMENT — FIBROSIS 4 INDEX: FIB4 SCORE: 1.62

## 2021-03-23 NOTE — PROGRESS NOTES
CC: Medication refill    HISTORY OF THE PRESENT ILLNESS: Patient is a 45 y.o. female. This pleasant patient is here today for evaluation and management of the following health problems.    Health Maintenance: Due      SLE (systemic lupus erythematosus related syndrome) (HCC)  Chronic health problem.  Continues with SLE, Sjogren's syndrome, polyarthritis with positive rheumatoid factor.  Managed by rheumatology, Dr. Dyer.  Taking Plaquenil and methotrexate.    Neck pain  Patient continues to struggle with intermittent neck pain and stiffness.  Relieved with cyclobenzaprine.  Still has fibromyalgia.    Moderate episode of recurrent major depressive disorder (HCC)  Patient is 45-year-old female with major depressive disorder and PTSD.  Managed by psychiatry.  Taking trazodone, Abilify, lorazepam, lamotrigine.    Fibromyalgia  This is a chronic health problem that is well controlled with current medications and lifestyle measures.  Taking Lyrica 100 mg twice a day.  Increased dose has helped pain a great deal.  Patient does still note that cold weather causes increase in fatigue and muscle aches.  Continues to have ongoing stress at work.    Elevated serum alkaline phosphatase level  Stable.  Component      Latest Ref Rng & Units 6/4/2020 1/20/2021           8:23 AM  3:08 PM   Alkaline Phosphatase      30 - 99 U/L 118 (H) 115 (H)       Leah-menopausal  She reports recent onset of hot flashes.  Had significant hot flash few weeks ago that lasted about 15 minutes.  She was very distracted and cannot concentrate during hot flash.  Periods are irregular.  Patient had tubal ligation many years ago.    Chronic intractable headache  Patient reports had a headache that lasted 2 months before [place].  Went to private facility and had Botox injection, which helped a great deal.  Did not have headache for over 2 months.  Headache is returning and has another Botox injection scheduled for next week.  She has to pay cash for  this.  Discussed referral to neurology for consideration of Botox and further work-up on her headaches.  Patient is agreeable to referral.    Cigarette nicotine dependence without complication  Patient reports she is down to half pack of cigarettes a day.  She is down from whole pack daily.       Allergies: Codeine    Current Outpatient Medications Ordered in Epic   Medication Sig Dispense Refill   • Lifitegrast (XIIDRA) 5 % Solution Administer  into affected eye(s).     • pregabalin (LYRICA) 100 MG Cap Take 1 capsule by mouth 2 times a day for 180 days. 180 capsule 1   • cyclobenzaprine (FLEXERIL) 10 mg Tab Take 1 tablet by mouth 1 time a day as needed for Muscle Spasms. 30 tablet 6   • LORazepam (ATIVAN) 0.5 MG Tab TAKE 1 TAB BY MOUTH 2 TIMES A DAY AS NEEDED FOR ANXIETY FOR UP TO 45 DAYS. NOT CVD 30 tablet 1   • methotrexate 2.5 MG Tab TAKE 4 TABS BY MOUTH EVERY 7 DAYS 60 tablet 1   • folic acid (FOLVITE) 1 MG Tab TAKE 1 TABLET BY MOUTH EVERY DAY 90 Tab 3   • hydroxychloroquine (PLAQUENIL) 200 MG Tab Take 2 Tabs by mouth every day. 180 Tab 3   • traZODone (DESYREL) 100 MG Tab Take 1 Tab by mouth at bedtime as needed. 90 Tab 4   • lamoTRIgine (LAMICTAL) 100 MG Tab Take 1 Tab by mouth every day. 90 Tab 4   • ARIPiprazole (ABILIFY) 5 MG tablet Take 1 Tab by mouth every day. 90 Tab 4   • Belimumab (BENLYSTA) 200 MG/ML Solution Auto-injector Inject 200 mg as instructed every 7 days. 12 mL 3   • fluticasone (FLONASE) 50 MCG/ACT nasal spray Spray 1 Spray in nose every day.     • aspirin EC (ECOTRIN) 81 MG Tablet Delayed Response Take 81 mg by mouth every day.     • DiphenhydrAMINE HCl (BENADRYL ALLERGY PO) Take  by mouth.     • predniSONE (DELTASONE) 5 MG Tab 20mg (4 tabs) daily for 5 days, then 15mg (3 tabs) daily for 5 days, then 10mg (2 tabs) daily for 5 days, then 5mg (1 tab) daily for 5 days then stop (Patient not taking: Reported on 3/23/2021) 50 Tab 0   • aspirin (AHMET LOW DOSE) 81 MG Chew Tab chewable tablet     "    No current [facility]-ordered facility-administered medications on file.       Past Medical History:   Diagnosis Date   • Depression    • Fibromyalgia        Past Surgical History:   Procedure Laterality Date   • DENTAL EXTRACTION(S)  1996   • KNEE ARTHROSCOPY     • PRIMARY C SECTION     • TUBAL LIGATION         Social History     Tobacco Use   • Smoking status: Current Every Day Smoker     Packs/day: 0.50     Years: 21.00     Pack years: 10.50   • Smokeless tobacco: Never Used   Substance Use Topics   • Alcohol use: No     Comment: rare   • Drug use: Yes     Types: Marijuana     Comment: topical lotion with THC for fibromyalgia       Family History   Problem Relation Age of Onset   • Cancer Mother         uterine   • Other Mother         celiac   • Depression Father    • Lung Disease Father    • Bladder cancer Father    • Other Father         drug addict   • Heart Disease Sister    • Other Sister         mental retardation   • Bipolar disorder Brother    • Other Brother         addiction   • Breast Cancer Maternal Grandmother    • Other Maternal Grandmother         stroke   • Prostate cancer Maternal Grandfather    • Heart Disease Paternal Grandmother    • Heart Disease Paternal Grandfather    • Other Brother         paranoid schizophrenia, drug addiction       ROS:   As in HPI, otherwise negative for chest pain, dyspnea, abdominal pain, dysuria, blood in stool, fever           Exam: /72 (BP Location: Left arm, Patient Position: Sitting, BP Cuff Size: Adult)   Pulse 98   Temp 37.3 °C (99.2 °F) (Temporal)   Resp 12   Ht 1.626 m (5' 4\")   Wt 92.5 kg (204 lb)   SpO2 99%  Body mass index is 35.02 kg/m².    General: Alert, pleasant, well nourished, well developed female in NAD  HEENT: Normocephalic. Eyes conjunctiva clear lids without ptosis, pupils equal and reactive to light, ears normal shape and contour, canals are clear bilaterally, tympanic membranes are pearly gray with good light reflex, nasal mucosa " without erythema and drainage, oropharynx is without erythema, edema or exudates.   Neck: Supple without bruit. Thyroid is not enlarged.  Pulmonary: Clear to ausculation.  Normal effort. No rales, ronchi, or wheezing.  Cardiovascular: Normal rate and rhythm without murmur. Carotid and radial pulses are intact and equal bilaterally.  No lower extremity edema.  Abdomen: Soft, nontender, nondistended. Normal bowel sounds. Liver and spleen are not palpable  Neurologic: Grossly nonfocal  Lymph: No cervical or supraclavicular lymph nodes are palpable  Skin: Warm and dry.    Musculoskeletal: Normal gait.   Psych: Normal mood and affect. Alert and oriented. Judgment and insight is normal.    Please note that this dictation was created using voice recognition software. I have made every reasonable attempt to correct obvious errors, but I expect that there are errors of grammar and possibly content that I did not discover before finalizing the note.      Assessment/Plan  1. Fibromyalgia  Well-controlled with Lyrica.   reviewed, no discrepancies.  Reviewed controlled substance agreement and patient signed.  Continue with lifestyle measures including gentle stretching, heat, walking.  - Controlled Substance Treatment Agreement  - pregabalin (LYRICA) 100 MG Cap; Take 1 capsule by mouth 2 times a day for 180 days.  Dispense: 180 capsule; Refill: 1  - cyclobenzaprine (FLEXERIL) 10 mg Tab; Take 1 tablet by mouth 1 time a day as needed for Muscle Spasms.  Dispense: 30 tablet; Refill: 6    2. Encounter for screening mammogram for breast cancer  Ordered.  - MA-SCREENING MAMMO BILAT W/TOMOSYNTHESIS W/CAD; Future    3. Chronic intractable headache, unspecified headache type  Patient having history of headache, relieved with Botox.  Will refer to neurology for further evaluation and treatment.  - REFERRAL TO NEUROLOGY    4. SLE (systemic lupus erythematosus related syndrome) (HCC)  Continue follow-up with rheumatology.    5. Neck  pain  Well-controlled.  Continue cyclobenzaprine as needed.  Refill sent to pharmacy today.  - cyclobenzaprine (FLEXERIL) 10 mg Tab; Take 1 tablet by mouth 1 time a day as needed for Muscle Spasms.  Dispense: 30 tablet; Refill: 6    6. Moderate episode of recurrent major depressive disorder (HCC)  Continue follow-up with psychiatry.    7. Elevated serum alkaline phosphatase level  Stable.  Continue to monitor.    8. Leah-menopausal  Patient symptoms are likely perimenopausal.  We will also get TSH level.    9. Cigarette nicotine dependence without complication  Congratulated patient on her smoking decrease.    10. Fatigue, unspecified type    - TSH WITH REFLEX TO FT4; Future    11. Irregular menses    - TSH WITH REFLEX TO FT4; Future    Patient will return to clinic for screening Pap in the next few months.  She will return to clinic in 6 months for medication refill.

## 2021-03-24 NOTE — ASSESSMENT & PLAN NOTE
Chronic health problem.  Continues with SLE, Sjogren's syndrome, polyarthritis with positive rheumatoid factor.  Managed by rheumatology, Dr. Dyer.  Taking Plaquenil and methotrexate.

## 2021-03-24 NOTE — ASSESSMENT & PLAN NOTE
Patient reports had a headache that lasted 2 months before [place].  Went to private facility and had Botox injection, which helped a great deal.  Did not have headache for over 2 months.  Headache is returning and has another Botox injection scheduled for next week.  She has to pay cash for this.  Discussed referral to neurology for consideration of Botox and further work-up on her headaches.  Patient is agreeable to referral.

## 2021-03-24 NOTE — ASSESSMENT & PLAN NOTE
She reports recent onset of hot flashes.  Had significant hot flash few weeks ago that lasted about 15 minutes.  She was very distracted and cannot concentrate during hot flash.  Periods are irregular.  Patient had tubal ligation many years ago.

## 2021-03-24 NOTE — ASSESSMENT & PLAN NOTE
Patient reports she is down to half pack of cigarettes a day.  She is down from whole pack daily.

## 2021-03-24 NOTE — ASSESSMENT & PLAN NOTE
This is a chronic health problem that is well controlled with current medications and lifestyle measures.  Taking Lyrica 100 mg twice a day.  Increased dose has helped pain a great deal.  Patient does still note that cold weather causes increase in fatigue and muscle aches.  Continues to have ongoing stress at work.

## 2021-03-24 NOTE — ASSESSMENT & PLAN NOTE
Stable.  Component      Latest Ref Rng & Units 6/4/2020 1/20/2021           8:23 AM  3:08 PM   Alkaline Phosphatase      30 - 99 U/L 118 (H) 115 (H)

## 2021-03-24 NOTE — ASSESSMENT & PLAN NOTE
Patient continues to struggle with intermittent neck pain and stiffness.  Relieved with cyclobenzaprine.  Still has fibromyalgia.

## 2021-03-24 NOTE — ASSESSMENT & PLAN NOTE
Patient is 45-year-old female with major depressive disorder and PTSD.  Managed by psychiatry.  Taking trazodone, Abilify, lorazepam, lamotrigine.

## 2021-06-09 ENCOUNTER — HOSPITAL ENCOUNTER (OUTPATIENT)
Dept: LAB | Facility: MEDICAL CENTER | Age: 46
End: 2021-06-09
Attending: INTERNAL MEDICINE
Payer: COMMERCIAL

## 2021-06-09 DIAGNOSIS — M32.8 OTHER FORMS OF SYSTEMIC LUPUS ERYTHEMATOSUS, UNSPECIFIED ORGAN INVOLVEMENT STATUS (HCC): ICD-10-CM

## 2021-06-09 DIAGNOSIS — Z79.899 LONG-TERM USE OF PLAQUENIL: ICD-10-CM

## 2021-06-09 DIAGNOSIS — M35.00 SJOGREN'S SYNDROME, WITH UNSPECIFIED ORGAN INVOLVEMENT (HCC): ICD-10-CM

## 2021-06-09 PROCEDURE — 86140 C-REACTIVE PROTEIN: CPT

## 2021-06-09 PROCEDURE — 36415 COLL VENOUS BLD VENIPUNCTURE: CPT

## 2021-06-09 PROCEDURE — 85652 RBC SED RATE AUTOMATED: CPT

## 2021-06-09 PROCEDURE — 85025 COMPLETE CBC W/AUTO DIFF WBC: CPT

## 2021-06-09 PROCEDURE — 80053 COMPREHEN METABOLIC PANEL: CPT

## 2021-06-09 PROCEDURE — 81003 URINALYSIS AUTO W/O SCOPE: CPT

## 2021-06-09 PROCEDURE — 86160 COMPLEMENT ANTIGEN: CPT | Mod: 91

## 2021-06-10 DIAGNOSIS — Z20.1 EXPOSURE TO TB: ICD-10-CM

## 2021-06-10 LAB
ALBUMIN SERPL BCP-MCNC: 4.4 G/DL (ref 3.2–4.9)
ALBUMIN/GLOB SERPL: 1.1 G/DL
ALP SERPL-CCNC: 105 U/L (ref 30–99)
ALT SERPL-CCNC: 30 U/L (ref 2–50)
ANION GAP SERPL CALC-SCNC: 10 MMOL/L (ref 7–16)
APPEARANCE UR: CLEAR
AST SERPL-CCNC: 36 U/L (ref 12–45)
BASOPHILS # BLD AUTO: 0.2 % (ref 0–1.8)
BASOPHILS # BLD: 0.01 K/UL (ref 0–0.12)
BILIRUB SERPL-MCNC: 0.3 MG/DL (ref 0.1–1.5)
BILIRUB UR QL STRIP.AUTO: NEGATIVE
BUN SERPL-MCNC: 6 MG/DL (ref 8–22)
C3 SERPL-MCNC: 98.7 MG/DL (ref 87–200)
C4 SERPL-MCNC: 11.7 MG/DL (ref 19–52)
CALCIUM SERPL-MCNC: 9.3 MG/DL (ref 8.5–10.5)
CHLORIDE SERPL-SCNC: 103 MMOL/L (ref 96–112)
CO2 SERPL-SCNC: 23 MMOL/L (ref 20–33)
COLOR UR: YELLOW
CREAT SERPL-MCNC: 0.8 MG/DL (ref 0.5–1.4)
CRP SERPL HS-MCNC: 0.45 MG/DL (ref 0–0.75)
EOSINOPHIL # BLD AUTO: 0 K/UL (ref 0–0.51)
EOSINOPHIL NFR BLD: 0 % (ref 0–6.9)
ERYTHROCYTE [DISTWIDTH] IN BLOOD BY AUTOMATED COUNT: 51.8 FL (ref 35.9–50)
ERYTHROCYTE [SEDIMENTATION RATE] IN BLOOD BY WESTERGREN METHOD: 11 MM/HOUR (ref 0–25)
GLOBULIN SER CALC-MCNC: 4.1 G/DL (ref 1.9–3.5)
GLUCOSE SERPL-MCNC: 140 MG/DL (ref 65–99)
GLUCOSE UR STRIP.AUTO-MCNC: NEGATIVE MG/DL
HCT VFR BLD AUTO: 43.4 % (ref 37–47)
HGB BLD-MCNC: 14.9 G/DL (ref 12–16)
IMM GRANULOCYTES # BLD AUTO: 0.01 K/UL (ref 0–0.11)
IMM GRANULOCYTES NFR BLD AUTO: 0.2 % (ref 0–0.9)
KETONES UR STRIP.AUTO-MCNC: NEGATIVE MG/DL
LEUKOCYTE ESTERASE UR QL STRIP.AUTO: NEGATIVE
LYMPHOCYTES # BLD AUTO: 0.85 K/UL (ref 1–4.8)
LYMPHOCYTES NFR BLD: 16.9 % (ref 22–41)
MCH RBC QN AUTO: 33.9 PG (ref 27–33)
MCHC RBC AUTO-ENTMCNC: 34.3 G/DL (ref 33.6–35)
MCV RBC AUTO: 98.6 FL (ref 81.4–97.8)
MICRO URNS: NORMAL
MONOCYTES # BLD AUTO: 0.2 K/UL (ref 0–0.85)
MONOCYTES NFR BLD AUTO: 4 % (ref 0–13.4)
NEUTROPHILS # BLD AUTO: 3.95 K/UL (ref 2–7.15)
NEUTROPHILS NFR BLD: 78.7 % (ref 44–72)
NITRITE UR QL STRIP.AUTO: NEGATIVE
NRBC # BLD AUTO: 0 K/UL
NRBC BLD-RTO: 0 /100 WBC
PH UR STRIP.AUTO: 6.5 [PH] (ref 5–8)
PLATELET # BLD AUTO: 178 K/UL (ref 164–446)
PMV BLD AUTO: 11.2 FL (ref 9–12.9)
POTASSIUM SERPL-SCNC: 4 MMOL/L (ref 3.6–5.5)
PROT SERPL-MCNC: 8.5 G/DL (ref 6–8.2)
PROT UR QL STRIP: NEGATIVE MG/DL
RBC # BLD AUTO: 4.4 M/UL (ref 4.2–5.4)
RBC UR QL AUTO: NEGATIVE
SODIUM SERPL-SCNC: 136 MMOL/L (ref 135–145)
SP GR UR STRIP.AUTO: 1
UROBILINOGEN UR STRIP.AUTO-MCNC: 0.2 MG/DL
WBC # BLD AUTO: 5 K/UL (ref 4.8–10.8)

## 2021-06-10 NOTE — PROGRESS NOTES
Digna    Augusta needs a chest X-Ray to r/o TB for her employer, she has tested positive with the intradermal method and is requiring a chest X-Ray. I have pended the order if you agree.    Thanks,    Jeffery Wyatt R.N.

## 2021-07-01 ENCOUNTER — OFFICE VISIT (OUTPATIENT)
Dept: NEUROLOGY | Facility: MEDICAL CENTER | Age: 46
End: 2021-07-01
Attending: PSYCHIATRY & NEUROLOGY
Payer: COMMERCIAL

## 2021-07-01 VITALS
OXYGEN SATURATION: 97 % | TEMPERATURE: 97.3 F | WEIGHT: 198.19 LBS | HEIGHT: 65 IN | HEART RATE: 92 BPM | SYSTOLIC BLOOD PRESSURE: 104 MMHG | BODY MASS INDEX: 33.02 KG/M2 | DIASTOLIC BLOOD PRESSURE: 70 MMHG

## 2021-07-01 PROCEDURE — 99211 OFF/OP EST MAY X REQ PHY/QHP: CPT | Performed by: PSYCHIATRY & NEUROLOGY

## 2021-07-01 PROCEDURE — 99204 OFFICE O/P NEW MOD 45 MIN: CPT | Performed by: PSYCHIATRY & NEUROLOGY

## 2021-07-01 RX ORDER — RIMEGEPANT SULFATE 75 MG/75MG
75 TABLET, ORALLY DISINTEGRATING ORAL
Qty: 10 TABLET | Refills: 5 | Status: SHIPPED | OUTPATIENT
Start: 2021-07-01 | End: 2021-10-29

## 2021-07-01 ASSESSMENT — FIBROSIS 4 INDEX: FIB4 SCORE: 1.62
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
## 2021-07-02 ENCOUNTER — PHARMACY VISIT (OUTPATIENT)
Dept: PHARMACY | Facility: MEDICAL CENTER | Age: 46
End: 2021-07-02
Payer: MEDICARE

## 2021-07-02 PROCEDURE — RXMED WILLOW AMBULATORY MEDICATION CHARGE: Performed by: PSYCHIATRY & NEUROLOGY

## 2021-07-08 DIAGNOSIS — G43.709 CHRONIC MIGRAINE W/O AURA W/O STATUS MIGRAINOSUS, NOT INTRACTABLE: ICD-10-CM

## 2021-07-18 PROCEDURE — RXMED WILLOW AMBULATORY MEDICATION CHARGE: Performed by: PSYCHIATRY & NEUROLOGY

## 2021-07-23 ENCOUNTER — PHARMACY VISIT (OUTPATIENT)
Dept: PHARMACY | Facility: MEDICAL CENTER | Age: 46
End: 2021-07-23
Payer: MEDICARE

## 2021-09-22 ENCOUNTER — TELEMEDICINE (OUTPATIENT)
Dept: BEHAVIORAL HEALTH | Facility: CLINIC | Age: 46
End: 2021-09-22
Payer: COMMERCIAL

## 2021-09-22 DIAGNOSIS — F41.9 ANXIETY DISORDER, UNSPECIFIED TYPE: ICD-10-CM

## 2021-09-22 DIAGNOSIS — F43.10 POSTTRAUMATIC STRESS DISORDER: ICD-10-CM

## 2021-09-22 DIAGNOSIS — G47.09 OTHER INSOMNIA: ICD-10-CM

## 2021-09-22 DIAGNOSIS — F33.1 MODERATE EPISODE OF RECURRENT MAJOR DEPRESSIVE DISORDER (HCC): ICD-10-CM

## 2021-09-22 DIAGNOSIS — F41.1 GENERALIZED ANXIETY DISORDER: ICD-10-CM

## 2021-09-22 PROCEDURE — 99214 OFFICE O/P EST MOD 30 MIN: CPT | Mod: 95 | Performed by: PSYCHIATRY & NEUROLOGY

## 2021-09-22 RX ORDER — LAMOTRIGINE 100 MG/1
100 TABLET ORAL DAILY
Qty: 90 TABLET | Refills: 4 | Status: SHIPPED | OUTPATIENT
Start: 2021-09-22 | End: 2022-08-25 | Stop reason: SDUPTHER

## 2021-09-22 RX ORDER — PRAZOSIN HYDROCHLORIDE 1 MG/1
1 CAPSULE ORAL EVERY EVENING
Qty: 90 CAPSULE | Refills: 3 | Status: SHIPPED | OUTPATIENT
Start: 2021-09-22 | End: 2021-11-09

## 2021-09-22 RX ORDER — TRAZODONE HYDROCHLORIDE 100 MG/1
100 TABLET ORAL NIGHTLY PRN
Qty: 90 TABLET | Refills: 4 | Status: SHIPPED | OUTPATIENT
Start: 2021-09-22 | End: 2022-08-25 | Stop reason: SDUPTHER

## 2021-09-22 RX ORDER — ARIPIPRAZOLE 5 MG/1
5 TABLET ORAL DAILY
Qty: 90 TABLET | Refills: 4 | Status: SHIPPED | OUTPATIENT
Start: 2021-09-22 | End: 2022-08-25 | Stop reason: SDUPTHER

## 2021-09-22 RX ORDER — LORAZEPAM 0.5 MG/1
0.5 TABLET ORAL
Qty: 30 TABLET | Refills: 5 | Status: SHIPPED | OUTPATIENT
Start: 2021-09-22 | End: 2021-10-22
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
## 2021-10-05 ENCOUNTER — OFFICE VISIT (OUTPATIENT)
Dept: NEUROLOGY | Facility: MEDICAL CENTER | Age: 46
End: 2021-10-05
Attending: PSYCHIATRY & NEUROLOGY
Payer: COMMERCIAL

## 2021-10-05 VITALS
HEART RATE: 95 BPM | WEIGHT: 195.55 LBS | DIASTOLIC BLOOD PRESSURE: 72 MMHG | BODY MASS INDEX: 33.38 KG/M2 | HEIGHT: 64 IN | SYSTOLIC BLOOD PRESSURE: 118 MMHG | OXYGEN SATURATION: 96 %

## 2021-10-05 DIAGNOSIS — G43.709 CHRONIC MIGRAINE W/O AURA W/O STATUS MIGRAINOSUS, NOT INTRACTABLE: Primary | ICD-10-CM

## 2021-10-05 PROCEDURE — 64615 CHEMODENERV MUSC MIGRAINE: CPT

## 2021-10-05 PROCEDURE — 64615 CHEMODENERV MUSC MIGRAINE: CPT | Performed by: PSYCHIATRY & NEUROLOGY

## 2021-10-05 PROCEDURE — 700111 HCHG RX REV CODE 636 W/ 250 OVERRIDE (IP): Performed by: PSYCHIATRY & NEUROLOGY

## 2021-10-05 RX ORDER — ONABOTULINUMTOXINA 200 [USP'U]/1
155 INJECTION, POWDER, LYOPHILIZED, FOR SOLUTION INTRADERMAL; INTRAMUSCULAR
COMMUNITY
Start: 2021-09-22 | End: 2022-01-11

## 2021-10-05 RX ORDER — RIMEGEPANT SULFATE 75 MG/75MG
75 TABLET, ORALLY DISINTEGRATING ORAL
COMMUNITY
End: 2022-04-08 | Stop reason: SDUPTHER

## 2021-10-05 RX ADMIN — ONABOTULINUMTOXINA 155 UNITS: 200 INJECTION, POWDER, LYOPHILIZED, FOR SOLUTION INTRADERMAL; INTRAMUSCULAR at 09:22

## 2021-10-05 ASSESSMENT — PATIENT HEALTH QUESTIONNAIRE - PHQ9
5. POOR APPETITE OR OVEREATING: 1 - SEVERAL DAYS
CLINICAL INTERPRETATION OF PHQ2 SCORE: 2
SUM OF ALL RESPONSES TO PHQ QUESTIONS 1-9: 8

## 2021-10-05 ASSESSMENT — FIBROSIS 4 INDEX: FIB4 SCORE: 1.66

## 2021-10-05 NOTE — PROGRESS NOTES
[facility]  BOTOX PROCEDURE NOTE    Chronic Migraine:  Botox therapy has reduced patient’s migraines by more than 7 days and/or 100 hours per month.     I treated Augusta Allred in clinic today with BotoxA 155 units according to the dosing/injection paradigm currently mandated by the FDA for the management of chronic migraine.  Specifically, I injected:  - 5 units to the procerus,  - 5 units to the corrugators (bilaterally),  - a total of 20 units to the frontalis musculature,  - 20 units to the temporalis (bilaterally),  - 15 units to the occipitalis (bilaterally),  - 10 units to the cervical paraspinals (bilaterally), and  - 15 units to the trapezius musculature (bilaterally).    The remainder of the Botox 200 units mixed but not administered was discarded as wastage per FDA guidelines  Consent on file.  Patient identify verified with 2 patient identifiers.     Frequency of headaches is >15 days monthly with at least 8 migraines monthly.    Migraines include at least two of the following: worsened with activity or avoidance of activity with migraines (ie they go lie down), moderate to severe pain intensity, pulsing headache, unilateral headache and has  have either nausea or vomiting OR sensitivity to light and sound.     Although Augusta Allred is responding to botox she is NOT migraine free.  I recommend that Botox be continued at this time.    Augusta Allred has chronic migraines, defined as having 15 or more headaches days per month, 8 of which are migraines, over a minimum of the last three months.  Episodes last more than 4 hours (untreated).  Pt has 2 or more of following (see initial note):  - headache worsened with activity  - pain is moderate to severe intensity  - pulsing in nature  - unilateral   and patient either has nausea/vomiting OR sensitivity to light and sound.    No adverse effect of Botox noted at conclusion of today's appointment.    Follow up in 12 weeks for Botox  or sooner if needed.    Signed: Freddie Romero M.D.

## 2021-10-06 ENCOUNTER — TELEMEDICINE (OUTPATIENT)
Dept: MEDICAL GROUP | Facility: PHYSICIAN GROUP | Age: 46
End: 2021-10-06
Payer: COMMERCIAL

## 2021-10-06 VITALS — BODY MASS INDEX: 31.82 KG/M2 | HEIGHT: 65 IN | WEIGHT: 191 LBS

## 2021-10-06 DIAGNOSIS — M54.2 NECK PAIN: ICD-10-CM

## 2021-10-06 DIAGNOSIS — Z13.29 SCREENING FOR THYROID DISORDER: ICD-10-CM

## 2021-10-06 DIAGNOSIS — F43.10 POSTTRAUMATIC STRESS DISORDER: ICD-10-CM

## 2021-10-06 DIAGNOSIS — Z13.6 SCREENING FOR CARDIOVASCULAR CONDITION: ICD-10-CM

## 2021-10-06 DIAGNOSIS — Z13.21 ENCOUNTER FOR VITAMIN DEFICIENCY SCREENING: ICD-10-CM

## 2021-10-06 DIAGNOSIS — M79.7 FIBROMYALGIA: ICD-10-CM

## 2021-10-06 DIAGNOSIS — R73.09 ELEVATED GLUCOSE: ICD-10-CM

## 2021-10-06 DIAGNOSIS — F33.1 MODERATE EPISODE OF RECURRENT MAJOR DEPRESSIVE DISORDER (HCC): ICD-10-CM

## 2021-10-06 PROCEDURE — 99213 OFFICE O/P EST LOW 20 MIN: CPT | Mod: 95,CR | Performed by: NURSE PRACTITIONER

## 2021-10-06 RX ORDER — PREGABALIN 100 MG/1
100 CAPSULE ORAL 2 TIMES DAILY
Qty: 180 CAPSULE | Refills: 1 | Status: SHIPPED | OUTPATIENT
Start: 2021-10-06 | End: 2022-04-15 | Stop reason: SDUPTHER

## 2021-10-06 RX ORDER — CYCLOBENZAPRINE HCL 10 MG
10 TABLET ORAL
Qty: 30 TABLET | Refills: 6 | Status: SHIPPED | OUTPATIENT
Start: 2021-10-06 | End: 2022-04-15 | Stop reason: SDUPTHER

## 2021-10-06 ASSESSMENT — FIBROSIS 4 INDEX: FIB4 SCORE: 1.66

## 2021-10-06 NOTE — PROGRESS NOTES
Virtual Visit: Established Patient   This visit was conducted via Zoom using secure and encrypted videoconferencing technology.   The patient was in a private location in the state of [place].    The patient's identity was confirmed and verbal consent was obtained for this virtual visit.    Subjective:   CC:   Chief Complaint   Patient presents with   • Medication Refill       Augusta Allred is a 46 y.o. female presenting for evaluation and management of:    Fibromyalgia  As a means of avoiding spread of COVID-19, this visit is being conducted by video.  This is a chronic health problem that is well controlled with current medications and lifestyle measures.  Taking Lyrica 100 mg twice a day.  Also taking cyclobenzaprine as needed.  Pain is usually increased with weather change, but she has not noticed increase in pain with a drop in temperature here recently.  She attributes this to her stress being reduced with new medication prazosin for PTSD, managed by psychiatry..  Requesting refills of Lyrica and cyclobenzaprine today.    Elevated glucose  As a means of avoiding spread of COVID-19, this visit is being conducted by video.  CMP from 6/2021 shows elevated glucose of 140.  Patient does not remember if she was fasting.  She does remember that she had just finished a prednisone burst for lupus flare.    PTSD (post-traumatic stress disorder)  As a means of avoiding spread of COVID-19, this visit is being conducted by video.  Chronic in nature.  Established with psychiatry.  Recent medication addition is prazosin.  Patient has noticed a difference in stress.  Also taking lamotrigine, lorazepam, Abilify, trazodone.  She will continue follow-up with psychiatry.      ROS   As in HPI, otherwise negative for chest pain, dyspnea, abdominal pain, dysuria, blood in stool, fever      Current medicines (including changes today)  Current Outpatient Medications   Medication Sig Dispense Refill   • cyclobenzaprine  (FLEXERIL) 10 mg Tab Take 1 Tablet by mouth 1 time a day as needed for Muscle Spasms. 30 Tablet 6   • pregabalin (LYRICA) 100 MG Cap Take 1 Capsule by mouth 2 times a day for 180 days. 180 Capsule 1   • Rimegepant Sulfate (NURTEC) 75 MG TABLET DISPERSIBLE Take 75 mg by mouth.     • BOTOX 200 units Recon Soln injection Inject 155 Units under the skin every 90 days.     • traZODone (DESYREL) 100 MG Tab Take 1 Tablet by mouth at bedtime as needed. 90 Tablet 4   • lamoTRIgine (LAMICTAL) 100 MG Tab Take 1 Tablet by mouth every day. 90 Tablet 4   • ARIPiprazole (ABILIFY) 5 MG tablet Take 1 Tablet by mouth every day. 90 Tablet 4   • prazosin (MINIPRESS) 1 MG Cap Take 1 Capsule by mouth every evening. (increase to 2 tabs after 2 weeks if no improvement noted) 90 Capsule 3   • LORazepam (ATIVAN) 0.5 MG Tab Take 1 Tablet by mouth 1 time a day as needed (for severe anxiety or panic attacks only) for up to 30 days. 30 Tablet 5   • hydroxychloroquine (PLAQUENIL) 200 MG Tab Take 2 Tablets by mouth every day. 180 Tablet 3   • methotrexate 2.5 MG Tab Take 5 Tablets by mouth every 7 days. 48 Tablet 0   • predniSONE (DELTASONE) 5 MG Tab 20mg (4 tabs) daily for 5 days, then 15mg (3 tabs) daily for 5 days, then 10mg (2 tabs) daily for 5 days, then 5mg (1 tab) daily for 5 days then stop. Take in AM with food 50 tablet 1   • Lifitegrast (XIIDRA) 5 % Solution Administer  into affected eye(s).     • folic acid (FOLVITE) 1 MG Tab TAKE 1 TABLET BY MOUTH EVERY DAY 90 Tab 3   • Belimumab (BENLYSTA) 200 MG/ML Solution Auto-injector Inject 200 mg as instructed every 7 days. 12 mL 3   • fluticasone (FLONASE) 50 MCG/ACT nasal spray Spray 1 Spray in nose every day.     • aspirin EC (ECOTRIN) 81 MG Tablet Delayed Response Take 81 mg by mouth every day.     • DiphenhydrAMINE HCl (BENADRYL ALLERGY PO) Take  by mouth.       No current facility-administered medications for this visit.       Patient Active Problem List    Diagnosis Date Noted   •  "Elevated glucose 10/06/2021   • Leah-menopausal 03/23/2021   • Chronic intractable headache 03/23/2021   • Cigarette nicotine dependence without complication 03/23/2021   • Sjogren's syndrome (HCC) 12/05/2019   • Neck pain 12/05/2019   • Moderate episode of recurrent major depressive disorder ([facility]) 10/30/2018   • Generalized anxiety disorder 10/30/2018   • SLE (systemic lupus erythematosus related syndrome) ([facility]) 10/20/2018   • Elevated serum alkaline phosphatase level 07/10/2018   • Fibromyalgia 07/06/2018   • Seasonal allergies 07/06/2018   • Other insomnia 07/06/2018   • PTSD (post-traumatic stress disorder) 07/06/2018        Objective:   Ht 1.645 m (5' 4.75\") Comment: per pt  Wt 86.6 kg (191 lb)   BMI 32.03 kg/m²     Physical Exam:  Constitutional: Alert, no distress, well-groomed.  Skin: No rashes in visible areas.  Eye: Round. Conjunctiva clear, lids normal. No icterus.   ENMT: Lips pink without lesions, good dentition, moist mucous membranes. Phonation normal.  Neck: No masses, no thyromegaly. Moves freely without pain.  Respiratory: Unlabored respiratory effort, no cough or audible wheeze  Psych: Alert and oriented x3, normal affect and mood.     Component      Latest Ref Rng & Units 6/9/2021   Sodium      135 - 145 mmol/L 136   Potassium      3.6 - 5.5 mmol/L 4.0   Chloride      96 - 112 mmol/L 103   Co2      20 - 33 mmol/L 23   Anion Gap      7.0 - 16.0 10.0   Glucose      65 - 99 mg/dL 140 (H)   Bun      8 - 22 mg/dL 6 (L)   Creatinine      0.50 - 1.40 mg/dL 0.80   Calcium      8.5 - 10.5 mg/dL 9.3   AST(SGOT)      12 - 45 U/L 36   ALT(SGPT)      2 - 50 U/L 30   Alkaline Phosphatase      30 - 99 U/L 105 (H)   Total Bilirubin      0.1 - 1.5 mg/dL 0.3   Albumin      3.2 - 4.9 g/dL 4.4   Total Protein      6.0 - 8.2 g/dL 8.5 (H)   Globulin      1.9 - 3.5 g/dL 4.1 (H)   A-G Ratio      g/dL 1.1   GFR If African American      >60 mL/min/1.73 m 2 >60   GFR If Non African American      >60 mL/min/1.73 m 2 >60 "       Assessment and Plan:   The following treatment plan was discussed:     1. Fibromyalgia  Well-controlled. Continue with Lyrica twice a day and cyclobenzaprine as needed. Controlled substance agreement up-to-date.  reviewed.  - cyclobenzaprine (FLEXERIL) 10 mg Tab; Take 1 Tablet by mouth 1 time a day as needed for Muscle Spasms.  Dispense: 30 Tablet; Refill: 6  - pregabalin (LYRICA) 100 MG Cap; Take 1 Capsule by mouth 2 times a day for 180 days.  Dispense: 180 Capsule; Refill: 1    2. Neck pain  As in #1  - cyclobenzaprine (FLEXERIL) 10 mg Tab; Take 1 Tablet by mouth 1 time a day as needed for Muscle Spasms.  Dispense: 30 Tablet; Refill: 6    3. Moderate episode of recurrent major depressive disorder (HCC)  Continue follow-up with psychiatry. Will get updated vitamin D level.  - VITAMIN D,25 HYDROXY; Future    4. Screening for thyroid disorder  We will notify patient of results.  - TSH WITH REFLEX TO FT4; Future    5. Screening for cardiovascular condition  Notify patient of results.  - Lipid Profile; Future    6. Encounter for vitamin deficiency screening    - VITAMIN D,25 HYDROXY; Future    7. Elevated glucose  Elevated glucose on last CMP. Patient has upcoming labs due and A1c was ordered by other provider. Likely the elevated glucose was due to prednisone burst at time of lab draw.     8. PTSD (post-traumatic stress disorder)  Improved with prazosin. Continue follow-up with psychiatry.    Follow-up: retrun clinic 6 months

## 2021-10-06 NOTE — ASSESSMENT & PLAN NOTE
As a means of avoiding spread of COVID-19, this visit is being conducted by video.  Chronic in nature.  Established with psychiatry.  Recent medication addition is prazosin.  Patient has noticed a difference in stress.  Also taking lamotrigine, lorazepam, Abilify, trazodone.  She will continue follow-up with psychiatry.

## 2021-10-06 NOTE — ASSESSMENT & PLAN NOTE
As a means of avoiding spread of COVID-19, this visit is being conducted by video.  This is a chronic health problem that is well controlled with current medications and lifestyle measures.  Taking Lyrica 100 mg twice a day.  Also taking cyclobenzaprine as needed.  Pain is usually increased with weather change, but she has not noticed increase in pain with a drop in temperature here recently.  She attributes this to her stress being reduced with new medication prazosin for PTSD, managed by psychiatry..  Requesting refills of Lyrica and cyclobenzaprine today.

## 2021-10-06 NOTE — ASSESSMENT & PLAN NOTE
As a means of avoiding spread of COVID-19, this visit is being conducted by video.  CMP from 6/2021 shows elevated glucose of 140.  Patient does not remember if she was fasting.  She does remember that she had just finished a prednisone burst for lupus flare.

## 2021-10-07 PROCEDURE — RXMED WILLOW AMBULATORY MEDICATION CHARGE: Performed by: PSYCHIATRY & NEUROLOGY

## 2021-10-13 ENCOUNTER — PHARMACY VISIT (OUTPATIENT)
Dept: PHARMACY | Facility: MEDICAL CENTER | Age: 46
End: 2021-10-13
Payer: MEDICARE

## 2021-11-09 DIAGNOSIS — F43.10 POSTTRAUMATIC STRESS DISORDER: ICD-10-CM

## 2021-11-09 RX ORDER — PRAZOSIN HYDROCHLORIDE 1 MG/1
1 CAPSULE ORAL EVERY EVENING
Qty: 90 CAPSULE | Refills: 3 | OUTPATIENT
Start: 2021-11-09

## 2021-11-09 RX ORDER — PRAZOSIN HYDROCHLORIDE 2 MG/1
2 CAPSULE ORAL EVERY EVENING
Qty: 90 CAPSULE | Refills: 1 | Status: SHIPPED | OUTPATIENT
Start: 2021-11-09 | End: 2022-07-26 | Stop reason: SDUPTHER

## 2021-12-20 NOTE — TELEPHONE ENCOUNTER
Called [phone] (home)  lvm advising medication has been sent to Pharmacy and to call [phone] if she has any issues.  
Manisha- You have never prescribed. Please refill as you see fit.      
Please let patient know that I have sent refill for cyclobenzaprine to her pharmacy.  
1.77

## 2021-12-28 ENCOUNTER — PATIENT MESSAGE (OUTPATIENT)
Dept: MEDICAL GROUP | Facility: PHYSICIAN GROUP | Age: 46
End: 2021-12-28

## 2021-12-29 ENCOUNTER — PATIENT MESSAGE (OUTPATIENT)
Dept: MEDICAL GROUP | Facility: PHYSICIAN GROUP | Age: 46
End: 2021-12-29

## 2021-12-29 ENCOUNTER — TELEPHONE (OUTPATIENT)
Dept: MEDICAL GROUP | Facility: PHYSICIAN GROUP | Age: 46
End: 2021-12-29

## 2021-12-29 DIAGNOSIS — U07.1 COVID-19: ICD-10-CM

## 2021-12-30 ENCOUNTER — HOSPITAL ENCOUNTER (EMERGENCY)
Facility: MEDICAL CENTER | Age: 46
End: 2021-12-30
Attending: EMERGENCY MEDICINE
Payer: COMMERCIAL

## 2021-12-30 VITALS
RESPIRATION RATE: 16 BRPM | TEMPERATURE: 97.9 F | BODY MASS INDEX: 33.13 KG/M2 | HEIGHT: 65 IN | SYSTOLIC BLOOD PRESSURE: 125 MMHG | HEART RATE: 77 BPM | DIASTOLIC BLOOD PRESSURE: 78 MMHG | OXYGEN SATURATION: 99 % | WEIGHT: 198.85 LBS

## 2021-12-30 DIAGNOSIS — U07.1 COVID-19: ICD-10-CM

## 2021-12-30 PROCEDURE — M0243 CASIRIVI AND IMDEVI INFUSION: HCPCS

## 2021-12-30 PROCEDURE — 99283 EMERGENCY DEPT VISIT LOW MDM: CPT

## 2021-12-30 PROCEDURE — 700111 HCHG RX REV CODE 636 W/ 250 OVERRIDE (IP): Performed by: EMERGENCY MEDICINE

## 2021-12-30 RX ORDER — IMDEVIMAB 1332 MG/11.1ML
300 INJECTION, SOLUTION, CONCENTRATE INTRAVENOUS ONCE
Status: CANCELLED | OUTPATIENT
Start: 2021-12-30 | End: 2021-12-30

## 2021-12-30 RX ORDER — CASIRIVIMAB 1332 MG/11.1ML
300 INJECTION, SOLUTION, CONCENTRATE INTRAVENOUS ONCE
Status: CANCELLED | OUTPATIENT
Start: 2021-12-30 | End: 2021-12-30

## 2021-12-30 RX ADMIN — CASIRIVIMAB AND IMDEVIMAB 300 MG: 600; 600 INJECTION, SOLUTION, CONCENTRATE INTRAVENOUS at 11:30

## 2021-12-30 ASSESSMENT — FIBROSIS 4 INDEX: FIB4 SCORE: 1.66

## 2021-12-30 NOTE — PROGRESS NOTES
Patient is 46-year-old female with SLE, Sjogren's syndrome, fibromyalgia, depression anxiety who tested positive for COVID-19 on 12/28/2021. Symptom onset was 12/29/2021. Symptoms include slight cough and runny nose.  Patient is interested in Regen-COV injection.  I have provided the patient with the fact sheet for patients and parents/caregivers.  I informed patient that she has the option to accept or refuse monoclonal antibodies.  I informed patient that Regen-COV is not FDA approved, but that it is authorized for use in her emergency by the FDA.  I informed patient of the known risks and benefits of Regen-COV.  The patient consents to undergoing the Regen-COV injections at [facility] center.

## 2021-12-30 NOTE — ED NOTES
Pt ambulated into ED c/o weakness and exhaustion. Pt stated she took 2 at home covid tests on the 28 and both came back positive. Pt has been symptomatic since the 26. Pt was sent here by PCP for REGEN-COV.

## 2021-12-30 NOTE — ED PROVIDER NOTES
ED Provider Note    Scribed for Kaiden Brown M.D. by Alicja Tai. 12/30/2021  11:06 AM    Primary care provider: PATRIC Hinson  Means of arrival: Walk-in  History obtained from: Patient  History limited by: None    CHIEF COMPLAINT  Chief Complaint   Patient presents with    Coronavirus Screening     tested positive for COVID 2 days ago, here for Rejeneron     PPE Note: I personally donned full PPE for all patient encounters during this visit, including wearing an N95 respirator mask. Scribe remained outside the patient's room and did not have any contact with the patient for the duration of patient encounter.      HPI  Augusta Allred is a 46 y.o. female who presents to the Emergency Department requesting Regeneron monoclonal antibody treatment. She tested positive for COVID-19 2 days ago. She is fully vaccinated against COVID-19 and received a booster. She has a history of Lupus. She denies nausea and vomiting. She was evaluated by her primary care provider, and they discussed the potential for Regeneron prior to arrival.     REVIEW OF SYSTEMS   Pertinent positives include COVID-19 symptoms including rhinorrhea and cough.   Pertinent negatives include no nausea or vomiting.    See HPI for further details.     PAST MEDICAL HISTORY   has a past medical history of Depression and Fibromyalgia.    SURGICAL HISTORY   has a past surgical history that includes knee arthroscopy; primary c section; tubal ligation; and dental extraction(s) (1996).    SOCIAL HISTORY  Social History     Tobacco Use    Smoking status: Current Every Day Smoker     Packs/day: 0.50     Years: 21.00     Pack years: 10.50    Smokeless tobacco: Never Used   Vaping Use    Vaping Use: Never used   Substance Use Topics    Alcohol use: No     Comment: rare    Drug use: Yes     Types: Marijuana     Comment: topical lotion with THC for fibromyalgia      Social History     Substance and Sexual Activity   Drug Use Yes    Types:  Marijuana    Comment: topical lotion with THC for fibromyalgia       FAMILY HISTORY  Family History   Problem Relation Age of Onset    Cancer Mother         uterine    Other Mother         celiac    Depression Father     Lung Disease Father     Bladder cancer Father     Other Father         drug addict    Heart Disease Sister     Other Sister         mental retardation    Bipolar disorder Brother     Other Brother         addiction    Breast Cancer Maternal Grandmother     Other Maternal Grandmother         stroke    Prostate cancer Maternal Grandfather     Heart Disease Paternal Grandmother     Heart Disease Paternal Grandfather     Other Brother         paranoid schizophrenia, drug addiction       CURRENT MEDICATIONS  Current Outpatient Medications   Medication Instructions    ARIPiprazole (ABILIFY) 5 mg, Oral, DAILY    aspirin EC (ECOTRIN) 81 mg, Oral, DAILY    BENLYSTA 200 MG/ML Solution Auto-injector INJECT 1 PEN UNDER THE SKIN EVERY 7 DAYS.    Botox 155 Units, Subcutaneous, EVERY 90 DAYS    cyclobenzaprine (FLEXERIL) 10 mg, Oral, 1 TIME DAILY PRN    DiphenhydrAMINE HCl (BENADRYL ALLERGY PO) Oral    fluticasone (FLONASE) 50 MCG/ACT nasal spray 1 Spray, Nasal, DAILY    folic acid (FOLVITE) 1 MG Tab TAKE 1 TABLET BY MOUTH EVERY DAY    hydroxychloroquine (PLAQUENIL) 400 mg, Oral, DAILY    lamoTRIgine (LAMICTAL) 100 mg, Oral, DAILY    Lifitegrast (XIIDRA) 5 % Solution Ophthalmic    methotrexate 12.5 mg, Oral, EVERY 7 DAYS    Nurtec 75 mg, Oral    prazosin (MINIPRESS) 2 mg, Oral, EVERY EVENING    predniSONE (DELTASONE) 5 MG Tab 20mg (4 tabs) daily for 5 days, then 15mg (3 tabs) daily for 5 days, then 10mg (2 tabs) daily for 5 days, then 5mg (1 tab) daily for 5 days then stop. Take in AM with food    pregabalin (LYRICA) 100 mg, Oral, 2 TIMES DAILY    traZODone (DESYREL) 100 mg, Oral, NIGHTLY PRN       ALLERGIES  Allergies   Allergen Reactions    Codeine        PHYSICAL EXAM  VITAL SIGNS: /85   Pulse 98   Temp  "36.7 °C (98 °F) (Temporal)   Resp 18   Ht 1.651 m (5' 5\")   Wt 90.2 kg (198 lb 13.7 oz)   SpO2 96%   BMI 33.09 kg/m²     Nursing note and vitals reviewed.  Constitutional: Well-developed and well-nourished. No distress.   HENT: Head is normocephalic and atraumatic. Oropharynx is clear and moist without exudate or erythema.   Eyes: Pupils are equal, round, and reactive to light. Conjunctiva are normal.   Cardiovascular: Normal rate and regular rhythm. No murmur heard. Normal radial pulses.  Pulmonary/Chest: Breath sounds normal. No wheezes or rales. Prominent productive cough noted.  Abdominal: Soft and non-tender. No distention    Musculoskeletal: Extremities exhibit normal range of motion without edema or tenderness.   Neurological: Awake, alert and oriented to person, place, and time. No focal deficits noted.  Skin: Skin is warm and dry. No rash.   Psychiatric: Normal mood and affect. Appropriate for clinical situation.    COURSE & MEDICAL DECISION MAKING  Nursing notes, VS, PMSFHx reviewed in chart.     Review of past medical records shows the patient has a history of Lupus.     11:06 AM - Patient seen and examined at bedside. Patient will be treated with casirivimab-imdevimab.      Monoclonal antibody infusion EUA progress note    This patient is considered a candidate for monoclonal antibody infusion to treat/prevent high risk SARS-CoV-2 virus infection.      Criteria for use (treatment):  -Mild to moderate illness for less than 10 days and is high risk for progressing to severe COVID-19 and/or hospitalization.    -Not hospitalized.   -Does not require oxygen therapy due to COVID-19.    ->40 kg and 12 years old or greater    Criteria for use (post-exposure prophylaxis):  -High risk for progressing to severe COVID-19 and/or hospitalization  -Exposed (close contact per CDC) to COVID-19 positive individual OR high risk of exposure to COVID-19 because of positive individual in institutional setting   -Not " fully vaccinated or not expected to mount an adequate immune response due to immunocompromising condition or immunosuppressive medication  ->40 kg and 12 years old or greater    Date of positive COVID-19 test (treatment): 12/28/2021     Vaccinated for COVID-19 (yes/no): yes     Symptoms of COVID-19 (if being used for treatment): rhinorrhea and cough.    High risk criteria:   -Age of 65 or greater  -Body mass index of 25 kg/m2or greater  -12-17 years of age and have  -BMI greater than or equal to 85% of their age and gender based CDC growth chart  -Chronic kidney disease  -Diabetes  -Pregnancy  -Immunosuppressive disease  -Receiving immunosuppressive treatment  -Chronic lung disease  -65 years of age or greater  -Cardiovascular disease  -Hypertension  -Medical related technological dependence  -Sickle cell disease  -Congenital or acquired heart disease  -Neurodevelopmental disorder  -Other medical conditions or factors that place individual patients at high risk for progression to severe COVID-19 and authorization of REGEN-COV under the EUA is not limited to the medical conditions or factors listed above. Healthcare providers should consider the benefit-risk for an individual patient.    This patient has been given the EUA patient fact sheet and consents to receiving this medication.    The patient will return for new or worsening symptoms and is stable at the time of discharge.    The patient is referred to a primary physician for blood pressure management, diabetic screening, and for all other preventative health concerns.      DISPOSITION:  Patient will be discharged home in stable condition.    FOLLOW UP:  PATRIC Hinson  [address] [place] [address] [phone]    Schedule an appointment as soon as possible for a visit       [facility], Emergency Dept  [address]  [place] [address] [phone]    If symptoms worsen    FINAL IMPRESSION  1.  COVID-19          Alicja ARAYA (Scribe), am scribing for, and in the presence of, Kaiden Brown M.D..    Electronically signed by: Alicja Tai (Scribe), 12/30/2021    Kaiden ARAYA M.D. personally performed the services described in this documentation, as scribed by Alicja Tai in my presence, and it is both accurate and complete.    The note accurately reflects work and decisions made by me.  Kaiden Brown M.D.  12/30/2021  4:12 PM

## 2021-12-30 NOTE — ED TRIAGE NOTES
Chief Complaint   Patient presents with   • Coronavirus Screening     tested positive for COVID 2 days ago, here for Rejeneron   She has been Covid vaccinated

## 2022-01-04 ENCOUNTER — APPOINTMENT (OUTPATIENT)
Dept: NEUROLOGY | Facility: MEDICAL CENTER | Age: 47
End: 2022-01-04
Attending: NURSE PRACTITIONER
Payer: COMMERCIAL

## 2022-01-11 ENCOUNTER — HOSPITAL ENCOUNTER (OUTPATIENT)
Dept: LAB | Facility: MEDICAL CENTER | Age: 47
End: 2022-01-11
Attending: NURSE PRACTITIONER
Payer: COMMERCIAL

## 2022-01-11 ENCOUNTER — HOSPITAL ENCOUNTER (OUTPATIENT)
Dept: LAB | Facility: MEDICAL CENTER | Age: 47
End: 2022-01-11
Attending: INTERNAL MEDICINE
Payer: COMMERCIAL

## 2022-01-11 ENCOUNTER — OFFICE VISIT (OUTPATIENT)
Dept: NEUROLOGY | Facility: MEDICAL CENTER | Age: 47
End: 2022-01-11
Attending: PSYCHIATRY & NEUROLOGY
Payer: COMMERCIAL

## 2022-01-11 VITALS
SYSTOLIC BLOOD PRESSURE: 118 MMHG | BODY MASS INDEX: 34.06 KG/M2 | OXYGEN SATURATION: 97 % | DIASTOLIC BLOOD PRESSURE: 72 MMHG | WEIGHT: 199.52 LBS | HEIGHT: 64 IN | HEART RATE: 97 BPM

## 2022-01-11 DIAGNOSIS — M05.80 POLYARTHRITIS WITH POSITIVE RHEUMATOID FACTOR (HCC): ICD-10-CM

## 2022-01-11 DIAGNOSIS — G43.709 CHRONIC MIGRAINE W/O AURA W/O STATUS MIGRAINOSUS, NOT INTRACTABLE: Primary | ICD-10-CM

## 2022-01-11 DIAGNOSIS — F33.1 MODERATE EPISODE OF RECURRENT MAJOR DEPRESSIVE DISORDER (HCC): ICD-10-CM

## 2022-01-11 DIAGNOSIS — M35.00 SJOGREN'S SYNDROME, WITH UNSPECIFIED ORGAN INVOLVEMENT (HCC): ICD-10-CM

## 2022-01-11 DIAGNOSIS — R53.83 FATIGUE, UNSPECIFIED TYPE: ICD-10-CM

## 2022-01-11 DIAGNOSIS — M32.8 OTHER FORMS OF SYSTEMIC LUPUS ERYTHEMATOSUS, UNSPECIFIED ORGAN INVOLVEMENT STATUS (HCC): ICD-10-CM

## 2022-01-11 DIAGNOSIS — N92.6 IRREGULAR MENSES: ICD-10-CM

## 2022-01-11 DIAGNOSIS — Z13.21 ENCOUNTER FOR VITAMIN DEFICIENCY SCREENING: ICD-10-CM

## 2022-01-11 DIAGNOSIS — Z13.6 SCREENING FOR CARDIOVASCULAR CONDITION: ICD-10-CM

## 2022-01-11 DIAGNOSIS — Z79.899 HIGH RISK MEDICATION USE: ICD-10-CM

## 2022-01-11 DIAGNOSIS — Z79.899 LONG-TERM USE OF PLAQUENIL: ICD-10-CM

## 2022-01-11 LAB
25(OH)D3 SERPL-MCNC: 37 NG/ML (ref 30–100)
ALBUMIN SERPL BCP-MCNC: 4.3 G/DL (ref 3.2–4.9)
ALBUMIN/GLOB SERPL: 1.2 G/DL
ALP SERPL-CCNC: 128 U/L (ref 30–99)
ALT SERPL-CCNC: 40 U/L (ref 2–50)
ANION GAP SERPL CALC-SCNC: 11 MMOL/L (ref 7–16)
ANISOCYTOSIS BLD QL SMEAR: ABNORMAL
APPEARANCE UR: CLEAR
AST SERPL-CCNC: 41 U/L (ref 12–45)
BACTERIA #/AREA URNS HPF: ABNORMAL /HPF
BASOPHILS # BLD AUTO: 0 % (ref 0–1.8)
BASOPHILS # BLD: 0 K/UL (ref 0–0.12)
BILIRUB SERPL-MCNC: 0.2 MG/DL (ref 0.1–1.5)
BILIRUB UR QL STRIP.AUTO: NEGATIVE
BUN SERPL-MCNC: 10 MG/DL (ref 8–22)
C3 SERPL-MCNC: 105.8 MG/DL (ref 87–200)
C4 SERPL-MCNC: 12.3 MG/DL (ref 19–52)
CALCIUM SERPL-MCNC: 9.2 MG/DL (ref 8.5–10.5)
CHLORIDE SERPL-SCNC: 106 MMOL/L (ref 96–112)
CHOLEST SERPL-MCNC: 129 MG/DL (ref 100–199)
CO2 SERPL-SCNC: 23 MMOL/L (ref 20–33)
COLOR UR: YELLOW
CREAT SERPL-MCNC: 0.78 MG/DL (ref 0.5–1.4)
CREAT UR-MCNC: 48.82 MG/DL
CRP SERPL HS-MCNC: 0.61 MG/DL (ref 0–0.75)
DACRYOCYTES BLD QL SMEAR: NORMAL
EOSINOPHIL # BLD AUTO: 0.07 K/UL (ref 0–0.51)
EOSINOPHIL NFR BLD: 3.1 % (ref 0–6.9)
EPI CELLS #/AREA URNS HPF: NEGATIVE /HPF
ERYTHROCYTE [DISTWIDTH] IN BLOOD BY AUTOMATED COUNT: 50.3 FL (ref 35.9–50)
EST. AVERAGE GLUCOSE BLD GHB EST-MCNC: 100 MG/DL
FASTING STATUS PATIENT QL REPORTED: NORMAL
GLOBULIN SER CALC-MCNC: 3.7 G/DL (ref 1.9–3.5)
GLUCOSE SERPL-MCNC: 93 MG/DL (ref 65–99)
GLUCOSE UR STRIP.AUTO-MCNC: NEGATIVE MG/DL
HBA1C MFR BLD: 5.1 % (ref 4–5.6)
HCT VFR BLD AUTO: 42.7 % (ref 37–47)
HDLC SERPL-MCNC: 36 MG/DL
HGB BLD-MCNC: 14.5 G/DL (ref 12–16)
HYALINE CASTS #/AREA URNS LPF: ABNORMAL /LPF
KETONES UR STRIP.AUTO-MCNC: NEGATIVE MG/DL
LDLC SERPL CALC-MCNC: 71 MG/DL
LEUKOCYTE ESTERASE UR QL STRIP.AUTO: ABNORMAL
LYMPHOCYTES # BLD AUTO: 0.64 K/UL (ref 1–4.8)
LYMPHOCYTES NFR BLD: 28.9 % (ref 22–41)
MACROCYTES BLD QL SMEAR: ABNORMAL
MANUAL DIFF BLD: NORMAL
MCH RBC QN AUTO: 33.6 PG (ref 27–33)
MCHC RBC AUTO-ENTMCNC: 34 G/DL (ref 33.6–35)
MCV RBC AUTO: 99.1 FL (ref 81.4–97.8)
MICRO URNS: ABNORMAL
MICROCYTES BLD QL SMEAR: ABNORMAL
MONOCYTES # BLD AUTO: 0.32 K/UL (ref 0–0.85)
MONOCYTES NFR BLD AUTO: 14.4 % (ref 0–13.4)
MORPHOLOGY BLD-IMP: NORMAL
NEUTROPHILS # BLD AUTO: 1.18 K/UL (ref 2–7.15)
NEUTROPHILS NFR BLD: 53.6 % (ref 44–72)
NITRITE UR QL STRIP.AUTO: NEGATIVE
NRBC # BLD AUTO: 0 K/UL
NRBC BLD-RTO: 0 /100 WBC
OVALOCYTES BLD QL SMEAR: NORMAL
PH UR STRIP.AUTO: 6 [PH] (ref 5–8)
PLATELET # BLD AUTO: 186 K/UL (ref 164–446)
PLATELET BLD QL SMEAR: NORMAL
PMV BLD AUTO: 11 FL (ref 9–12.9)
POIKILOCYTOSIS BLD QL SMEAR: NORMAL
POLYCHROMASIA BLD QL SMEAR: NORMAL
POTASSIUM SERPL-SCNC: 4.4 MMOL/L (ref 3.6–5.5)
PROT SERPL-MCNC: 8 G/DL (ref 6–8.2)
PROT UR QL STRIP: NEGATIVE MG/DL
PROT UR-MCNC: 5 MG/DL (ref 0–15)
PROT/CREAT UR: 102 MG/G (ref 10–107)
RBC # BLD AUTO: 4.31 M/UL (ref 4.2–5.4)
RBC # URNS HPF: ABNORMAL /HPF
RBC BLD AUTO: PRESENT
RBC UR QL AUTO: NEGATIVE
SODIUM SERPL-SCNC: 140 MMOL/L (ref 135–145)
SP GR UR STRIP.AUTO: 1.01
TRIGL SERPL-MCNC: 110 MG/DL (ref 0–149)
TSH SERPL DL<=0.005 MIU/L-ACNC: 1.04 UIU/ML (ref 0.38–5.33)
UROBILINOGEN UR STRIP.AUTO-MCNC: 0.2 MG/DL
WBC # BLD AUTO: 2.2 K/UL (ref 4.8–10.8)
WBC #/AREA URNS HPF: ABNORMAL /HPF

## 2022-01-11 PROCEDURE — 700111 HCHG RX REV CODE 636 W/ 250 OVERRIDE (IP): Performed by: PSYCHIATRY & NEUROLOGY

## 2022-01-11 PROCEDURE — 82306 VITAMIN D 25 HYDROXY: CPT

## 2022-01-11 PROCEDURE — 85007 BL SMEAR W/DIFF WBC COUNT: CPT

## 2022-01-11 PROCEDURE — 85027 COMPLETE CBC AUTOMATED: CPT

## 2022-01-11 PROCEDURE — 36415 COLL VENOUS BLD VENIPUNCTURE: CPT

## 2022-01-11 PROCEDURE — 86140 C-REACTIVE PROTEIN: CPT

## 2022-01-11 PROCEDURE — 83036 HEMOGLOBIN GLYCOSYLATED A1C: CPT

## 2022-01-11 PROCEDURE — 84443 ASSAY THYROID STIM HORMONE: CPT

## 2022-01-11 PROCEDURE — 64615 CHEMODENERV MUSC MIGRAINE: CPT | Performed by: PSYCHIATRY & NEUROLOGY

## 2022-01-11 PROCEDURE — 84156 ASSAY OF PROTEIN URINE: CPT

## 2022-01-11 PROCEDURE — 86160 COMPLEMENT ANTIGEN: CPT

## 2022-01-11 PROCEDURE — 85652 RBC SED RATE AUTOMATED: CPT

## 2022-01-11 PROCEDURE — 81001 URINALYSIS AUTO W/SCOPE: CPT

## 2022-01-11 PROCEDURE — 80061 LIPID PANEL: CPT

## 2022-01-11 PROCEDURE — 80053 COMPREHEN METABOLIC PANEL: CPT

## 2022-01-11 PROCEDURE — 82570 ASSAY OF URINE CREATININE: CPT

## 2022-01-11 RX ADMIN — ONABOTULINUMTOXINA 155 UNITS: 200 INJECTION, POWDER, LYOPHILIZED, FOR SOLUTION INTRADERMAL; INTRAMUSCULAR at 10:50

## 2022-01-11 ASSESSMENT — PATIENT HEALTH QUESTIONNAIRE - PHQ9
5. POOR APPETITE OR OVEREATING: 1 - SEVERAL DAYS
CLINICAL INTERPRETATION OF PHQ2 SCORE: 2
SUM OF ALL RESPONSES TO PHQ QUESTIONS 1-9: 7

## 2022-01-11 ASSESSMENT — FIBROSIS 4 INDEX: FIB4 SCORE: 1.66

## 2022-01-12 LAB — ERYTHROCYTE [SEDIMENTATION RATE] IN BLOOD BY WESTERGREN METHOD: 24 MM/HOUR (ref 0–25)

## 2022-01-17 ENCOUNTER — HOSPITAL ENCOUNTER (OUTPATIENT)
Dept: LAB | Facility: MEDICAL CENTER | Age: 47
End: 2022-01-17
Attending: INTERNAL MEDICINE
Payer: COMMERCIAL

## 2022-01-17 DIAGNOSIS — D72.819 LEUKOPENIA, UNSPECIFIED TYPE: ICD-10-CM

## 2022-01-17 LAB
BASOPHILS # BLD AUTO: 0.2 % (ref 0–1.8)
BASOPHILS # BLD: 0.01 K/UL (ref 0–0.12)
EOSINOPHIL # BLD AUTO: 0.01 K/UL (ref 0–0.51)
EOSINOPHIL NFR BLD: 0.2 % (ref 0–6.9)
ERYTHROCYTE [DISTWIDTH] IN BLOOD BY AUTOMATED COUNT: 46.8 FL (ref 35.9–50)
HCT VFR BLD AUTO: 43 % (ref 37–47)
HGB BLD-MCNC: 14.9 G/DL (ref 12–16)
IMM GRANULOCYTES # BLD AUTO: 0.03 K/UL (ref 0–0.11)
IMM GRANULOCYTES NFR BLD AUTO: 0.7 % (ref 0–0.9)
LYMPHOCYTES # BLD AUTO: 0.65 K/UL (ref 1–4.8)
LYMPHOCYTES NFR BLD: 15.5 % (ref 22–41)
MCH RBC QN AUTO: 33.4 PG (ref 27–33)
MCHC RBC AUTO-ENTMCNC: 34.7 G/DL (ref 33.6–35)
MCV RBC AUTO: 96.4 FL (ref 81.4–97.8)
MONOCYTES # BLD AUTO: 0.12 K/UL (ref 0–0.85)
MONOCYTES NFR BLD AUTO: 2.9 % (ref 0–13.4)
NEUTROPHILS # BLD AUTO: 3.38 K/UL (ref 2–7.15)
NEUTROPHILS NFR BLD: 80.5 % (ref 44–72)
NRBC # BLD AUTO: 0 K/UL
NRBC BLD-RTO: 0 /100 WBC
PLATELET # BLD AUTO: 183 K/UL (ref 164–446)
PMV BLD AUTO: 11.1 FL (ref 9–12.9)
RBC # BLD AUTO: 4.46 M/UL (ref 4.2–5.4)
WBC # BLD AUTO: 4.2 K/UL (ref 4.8–10.8)

## 2022-01-17 PROCEDURE — 85025 COMPLETE CBC W/AUTO DIFF WBC: CPT

## 2022-01-17 PROCEDURE — 36415 COLL VENOUS BLD VENIPUNCTURE: CPT

## 2022-01-31 DIAGNOSIS — F41.1 GENERALIZED ANXIETY DISORDER: ICD-10-CM

## 2022-01-31 DIAGNOSIS — G43.709 CHRONIC MIGRAINE W/O AURA W/O STATUS MIGRAINOSUS, NOT INTRACTABLE: ICD-10-CM

## 2022-01-31 RX ORDER — LORAZEPAM 0.5 MG/1
TABLET ORAL
Qty: 30 TABLET | Refills: 5 | OUTPATIENT
Start: 2022-01-31

## 2022-04-08 ENCOUNTER — TELEPHONE (OUTPATIENT)
Dept: NEUROLOGY | Facility: MEDICAL CENTER | Age: 47
End: 2022-04-08

## 2022-04-08 ENCOUNTER — OFFICE VISIT (OUTPATIENT)
Dept: NEUROLOGY | Facility: MEDICAL CENTER | Age: 47
End: 2022-04-08
Attending: PSYCHIATRY & NEUROLOGY
Payer: COMMERCIAL

## 2022-04-08 VITALS
HEIGHT: 64 IN | HEART RATE: 80 BPM | BODY MASS INDEX: 33.87 KG/M2 | WEIGHT: 198.41 LBS | TEMPERATURE: 97.4 F | OXYGEN SATURATION: 97 % | RESPIRATION RATE: 16 BRPM | DIASTOLIC BLOOD PRESSURE: 68 MMHG | SYSTOLIC BLOOD PRESSURE: 112 MMHG

## 2022-04-08 DIAGNOSIS — G43.709 CHRONIC MIGRAINE W/O AURA W/O STATUS MIGRAINOSUS, NOT INTRACTABLE: Primary | ICD-10-CM

## 2022-04-08 PROCEDURE — 64615 CHEMODENERV MUSC MIGRAINE: CPT | Performed by: PSYCHIATRY & NEUROLOGY

## 2022-04-08 PROCEDURE — 700111 HCHG RX REV CODE 636 W/ 250 OVERRIDE (IP): Performed by: PSYCHIATRY & NEUROLOGY

## 2022-04-08 RX ORDER — RIMEGEPANT SULFATE 75 MG/75MG
75 TABLET, ORALLY DISINTEGRATING ORAL
Qty: 8 TABLET | Refills: 11 | Status: SHIPPED | OUTPATIENT
Start: 2022-04-08 | End: 2022-05-08

## 2022-04-08 RX ADMIN — ONABOTULINUMTOXINA 155 UNITS: 200 INJECTION, POWDER, LYOPHILIZED, FOR SOLUTION INTRADERMAL; INTRAMUSCULAR at 07:59

## 2022-04-08 ASSESSMENT — FIBROSIS 4 INDEX: FIB4 SCORE: 1.59

## 2022-04-08 ASSESSMENT — PAIN SCALES - GENERAL: PAINLEVEL: NO PAIN

## 2022-04-08 NOTE — TELEPHONE ENCOUNTER
Contacted patient at [phone] to discuss [facility] and services/benefits offered. No answer, left voicemail.    Tova Newell  Rx Coordinator   [phone]

## 2022-04-08 NOTE — TELEPHONE ENCOUNTER
NURTEC 75mg Dispersible Tablet    Refill request rec'd via VADIM, misha TC via Amirah, MICHAEL paid copay $0.00 #8/30 DS MAX 31 DS  notifying liaison Graciela Aranda. - 04/08/2022 8:47am

## 2022-04-08 NOTE — PROGRESS NOTES
[facility]  BOTOX PROCEDURE NOTE    Chronic Migraine:  Botox therapy has reduced patient’s migraines by more than 7 days and/or 100 hours per month.     I treated Augusta Allred in clinic today with BotoxA 155 units according to the dosing/injection paradigm currently mandated by the FDA for the management of chronic migraine.  Specifically, I injected:  - 5 units to the procerus,  - 5 units to the corrugators (bilaterally),  - a total of 20 units to the frontalis musculature,  - 20 units to the temporalis (bilaterally),  - 15 units to the occipitalis (bilaterally),  - 10 units to the cervical paraspinals (bilaterally), and  - 15 units to the trapezius musculature (bilaterally).    The remainder of the Botox was discarded as wastage per FDA guidelines  Consent on file.  Patient identify verified with 2 patient identifiers.     Frequency of headaches is >15 days monthly with at least 8 migraines monthly.    Migraines include at least two of the following: worsened with activity or avoidance of activity with migraines (ie they go lie down), moderate to severe pain intensity, pulsing headache, unilateral headache and has  have either nausea or vomiting OR sensitivity to light and sound.     Although Augusta Allred is responding to botox she is NOT migraine free.  I recommend that Botox be continued at this time.    Augusta Allred has chronic migraines, defined as having 15 or more headaches days per month, 8 of which are migraines, over a minimum of the last three months.  Episodes last more than 4 hours (untreated).  Pt has 2 or more of following (see initial note):  - headache worsened with activity  - pain is moderate to severe intensity  - pulsing in nature  - unilateral   and patient either has nausea/vomiting OR sensitivity to light and sound.    No adverse effect of Botox noted at conclusion of today's appointment.    Follow up in 12 weeks for Botox or sooner if needed.    Signed: Freddie  DILCIA Romero M.D.

## 2022-04-15 ENCOUNTER — OFFICE VISIT (OUTPATIENT)
Dept: MEDICAL GROUP | Facility: PHYSICIAN GROUP | Age: 47
End: 2022-04-15
Payer: COMMERCIAL

## 2022-04-15 VITALS
BODY MASS INDEX: 33.63 KG/M2 | HEART RATE: 88 BPM | HEIGHT: 64 IN | RESPIRATION RATE: 16 BRPM | SYSTOLIC BLOOD PRESSURE: 120 MMHG | DIASTOLIC BLOOD PRESSURE: 76 MMHG | WEIGHT: 197 LBS | OXYGEN SATURATION: 94 % | TEMPERATURE: 98 F

## 2022-04-15 DIAGNOSIS — Z12.31 ENCOUNTER FOR SCREENING MAMMOGRAM FOR BREAST CANCER: ICD-10-CM

## 2022-04-15 DIAGNOSIS — N93.9 ABNORMAL UTERINE BLEEDING: ICD-10-CM

## 2022-04-15 DIAGNOSIS — M54.2 NECK PAIN: ICD-10-CM

## 2022-04-15 DIAGNOSIS — N39.3 STRESS INCONTINENCE: ICD-10-CM

## 2022-04-15 DIAGNOSIS — M79.7 FIBROMYALGIA: ICD-10-CM

## 2022-04-15 PROCEDURE — 99214 OFFICE O/P EST MOD 30 MIN: CPT | Performed by: NURSE PRACTITIONER

## 2022-04-15 RX ORDER — ACETAMINOPHEN AND CODEINE PHOSPHATE 120; 12 MG/5ML; MG/5ML
1 SOLUTION ORAL DAILY
Qty: 84 TABLET | Refills: 3 | Status: SHIPPED | OUTPATIENT
Start: 2022-04-15 | End: 2022-05-27 | Stop reason: SINTOL

## 2022-04-15 RX ORDER — CYCLOBENZAPRINE HCL 10 MG
10 TABLET ORAL
Qty: 30 TABLET | Refills: 6 | Status: SHIPPED | OUTPATIENT
Start: 2022-04-15 | End: 2023-03-10 | Stop reason: SDUPTHER

## 2022-04-15 RX ORDER — PREGABALIN 100 MG/1
100 CAPSULE ORAL 2 TIMES DAILY
Qty: 180 CAPSULE | Refills: 2 | Status: SHIPPED | OUTPATIENT
Start: 2022-04-15 | End: 2023-03-10 | Stop reason: SDUPTHER

## 2022-04-15 ASSESSMENT — FIBROSIS 4 INDEX: FIB4 SCORE: 1.59

## 2022-04-15 NOTE — PROGRESS NOTES
CC: Medication refill, menses, incontinence    HISTORY OF THE PRESENT ILLNESS: Patient is a 46 y.o. female. This pleasant patient is here today for evaluation and management of the following health problems.        Fibromyalgia  This is a chronic health problem that is well controlled with current medications and lifestyle measures.  Taking Lyrica 100 mg twice a day and cyclobenzaprine as needed.  Asking for 9-month refill as she will been losing her health insurance in the next few months as her  is leaving his job.  Not sure when she will be getting more health insurance.  Patient does not have health insurance offer through her work.    Abnormal uterine bleeding  Patient reports light menses for 5 weeks.  Reports periods are irregular.  Goes 5 to 6 months between menses.  Has never had a menses last this long.  She is wanting something to help stop her menses.  Denies abdominal pain, dyspareunia, vaginal discharge.    Stress incontinence  Patient reports stress incontinence that has been worsening since she had COVID 4 months ago.  Has to wear a pad.  Sometimes will just leak and not happen during a cough or sneeze.  Denies dysuria, hematuria.      Allergies: Codeine    Current Outpatient Medications Ordered in Epic   Medication Sig Dispense Refill   • pregabalin (LYRICA) 100 MG Cap Take 1 Capsule by mouth 2 times a day for 270 days. 180 Capsule 2   • norethindrone (MICRONOR) 0.35 MG tablet Take 1 Tablet by mouth every day. 84 Tablet 3   • cyclobenzaprine (FLEXERIL) 10 mg Tab Take 1 Tablet by mouth 1 time a day as needed for Muscle Spasms. 30 Tablet 6   • Rimegepant Sulfate (NURTEC) 75 MG TABLET DISPERSIBLE Take 1 Tablet by mouth 1 time a day as needed for up to 30 days. 8 Tablet 11   • naproxen (NAPROSYN) 500 MG Tab Take 1 Tablet by mouth 2 times daily with meals as needed. 60 Tablet 5   • predniSONE (DELTASONE) 5 MG Tab 20mg (4 tabs) daily for 5 days, then 15mg (3 tabs) daily for 5 days, then 10mg (2  tabs) daily for 5 days, then 5mg (1 tab) daily for 5 days then stop. Take in AM with food 50 Tablet 1   • methotrexate 2.5 MG Tab Take 6 Tablets by mouth every 7 days. 90 Tablet 1   • folic acid (FOLVITE) 1 MG Tab Take 1 Tablet by mouth every day. 90 Tablet 3   • prazosin (MINIPRESS) 2 MG Cap Take 1 Capsule by mouth every evening. 90 Capsule 1   • BENLYSTA 200 MG/ML Solution Auto-injector INJECT 1 PEN UNDER THE SKIN EVERY 7 DAYS. 3.92 mL 11   • traZODone (DESYREL) 100 MG Tab Take 1 Tablet by mouth at bedtime as needed. 90 Tablet 4   • lamoTRIgine (LAMICTAL) 100 MG Tab Take 1 Tablet by mouth every day. 90 Tablet 4   • ARIPiprazole (ABILIFY) 5 MG tablet Take 1 Tablet by mouth every day. 90 Tablet 4   • hydroxychloroquine (PLAQUENIL) 200 MG Tab Take 2 Tablets by mouth every day. 180 Tablet 3   • Lifitegrast (XIIDRA) 5 % Solution Administer  into affected eye(s).     • fluticasone (FLONASE) 50 MCG/ACT nasal spray Spray 1 Spray in nose every day.     • aspirin EC (ECOTRIN) 81 MG Tablet Delayed Response Take 81 mg by mouth every day.     • DiphenhydrAMINE HCl (BENADRYL ALLERGY PO) Take  by mouth.       No current Epic-ordered facility-administered medications on file.       Past Medical History:   Diagnosis Date   • Depression    • Fibromyalgia        Past Surgical History:   Procedure Laterality Date   • DENTAL EXTRACTION(S)  1996   • KNEE ARTHROSCOPY     • PRIMARY C SECTION     • TUBAL LIGATION         Social History     Tobacco Use   • Smoking status: Current Every Day Smoker     Packs/day: 0.50     Years: 21.00     Pack years: 10.50   • Smokeless tobacco: Never Used   Vaping Use   • Vaping Use: Never used   Substance Use Topics   • Alcohol use: No     Comment: rare   • Drug use: Yes     Types: Marijuana, Oral     Comment: topical lotion with THC for fibromyalgia       Family History   Problem Relation Age of Onset   • Cancer Mother         uterine   • Other Mother         celiac   • Depression Father    • Lung Disease  "Father    • Bladder cancer Father    • Other Father         drug addict   • Heart Disease Sister    • Other Sister         mental retardation   • Bipolar disorder Brother    • Other Brother         addiction   • Breast Cancer Maternal Grandmother    • Other Maternal Grandmother         stroke   • Prostate cancer Maternal Grandfather    • Heart Disease Paternal Grandmother    • Heart Disease Paternal Grandfather    • Other Brother         paranoid schizophrenia, drug addiction       ROS:   As in HPI, otherwise negative for chest pain, dyspnea, abdominal pain, dysuria, blood in stool, fever           Exam: /76   Pulse 88   Temp 36.7 °C (98 °F) (Temporal)   Resp 16   Ht 1.626 m (5' 4\")   Wt 89.4 kg (197 lb)   SpO2 94%  Body mass index is 33.81 kg/m².    General: Alert, pleasant, well nourished, well developed female in NAD  HEENT: Normocephalic. Eyes conjunctiva clear lids without ptosis,  oropharynx is without erythema, edema or exudates.   Neck: Supple without bruit. Thyroid is not enlarged.  Pulmonary: Clear to ausculation.  Normal effort. No rales, ronchi, or wheezing.  Cardiovascular: Normal rate and rhythm without murmur. .  Abdomen: Soft, nontender, nondistended. Normal bowel sounds. Liver and spleen are not palpable  Negative CVA tenderness  Neurologic: Grossly nonfocal  Lymph: No cervical or supraclavicular lymph nodes are palpable  Skin: Warm and dry.    Musculoskeletal: Normal gait.   Psych: Normal mood and affect. Alert and oriented. Judgment and insight is normal.    Please note that this dictation was created using voice recognition software. I have made every reasonable attempt to correct obvious errors, but I expect that there are errors of grammar and possibly content that I did not discover before finalizing the note.      Assessment/Plan  1. Fibromyalgia  Well-controlled.  Continue with Lyrica, medically necessary.  Continue with cyclobenzaprine as needed.  Reviewed lifestyle measures.  "  reviewed.  - pregabalin (LYRICA) 100 MG Cap; Take 1 Capsule by mouth 2 times a day for 270 days.  Dispense: 180 Capsule; Refill: 2  - cyclobenzaprine (FLEXERIL) 10 mg Tab; Take 1 Tablet by mouth 1 time a day as needed for Muscle Spasms.  Dispense: 30 Tablet; Refill: 6    2. Abnormal uterine bleeding  Patient will start norethindrone daily.  If menses does not stop, consider referral to gynecology or pelvic ultrasound.  Overdue for Pap smear.  - norethindrone (MICRONOR) 0.35 MG tablet; Take 1 Tablet by mouth every day.  Dispense: 84 Tablet; Refill: 3    3. Encounter for screening mammogram for breast cancer  Ordered  - MA-SCREENING MAMMO BILAT W/CAD; Future    4. Neck pain    - cyclobenzaprine (FLEXERIL) 10 mg Tab; Take 1 Tablet by mouth 1 time a day as needed for Muscle Spasms.  Dispense: 30 Tablet; Refill: 6    5. Stress incontinence  Patient unable to give urine specimen today. Advised on Lbs.  Handouts given to patient.    She will return to clinic next month for screening Pap.

## 2022-04-15 NOTE — ASSESSMENT & PLAN NOTE
This is a chronic health problem that is well controlled with current medications and lifestyle measures.  Taking Lyrica 100 mg twice a day and cyclobenzaprine as needed.  Asking for 9-month refill as she will been losing her health insurance in the next few months as her  is leaving his job.  Not sure when she will be getting more health insurance.  Patient does not have health insurance offer through her work.

## 2022-04-15 NOTE — PATIENT INSTRUCTIONS
Urinary Incontinence    Urinary incontinence refers to a condition in which a person is unable to control where and when to pass urine. A person with this condition will urinate when he or she does not mean to (involuntarily).  What are the causes?  This condition may be caused by:  · Medicines.  · Infections.  · Constipation.  · Overactive bladder muscles.  · Weak bladder muscles.  · Weak pelvic floor muscles. These muscles provide support for the bladder, intestine, and, in women, the uterus.  · Enlarged prostate in men. The prostate is a gland near the bladder. When it gets too big, it can pinch the urethra. With the urethra blocked, the bladder can weaken and lose the ability to empty properly.  · Surgery.  · Emotional factors, such as anxiety, stress, or post-traumatic stress disorder (PTSD).  · Pelvic organ prolapse. This happens in women when organs shift out of place and into the vagina. This shift can prevent the bladder and urethra from working properly.  What increases the risk?  The following factors may make you more likely to develop this condition:  · Older age.  · Obesity and physical inactivity.  · Pregnancy and childbirth.  · Menopause.  · Diseases that affect the nerves or spinal cord (neurological diseases).  · Long-term (chronic) coughing. This can increase pressure on the bladder and pelvic floor muscles.  What are the signs or symptoms?  Symptoms may vary depending on the type of urinary incontinence you have. They include:  · A sudden urge to urinate, but passing urine involuntarily before you can get to a bathroom (urge incontinence).  · Suddenly passing urine with any activity that forces urine to pass, such as coughing, laughing, exercise, or sneezing (stress incontinence).  · Needing to urinate often, but urinating only a small amount, or constantly dribbling urine (overflow incontinence).  · Urinating because you cannot get to the bathroom in time due to a physical disability, such as  arthritis or injury, or communication and thinking problems, such as Alzheimer disease (functional incontinence).  How is this diagnosed?  This condition may be diagnosed based on:  · Your medical history.  · A physical exam.  · Tests, such as:  ? Urine tests.  ? X-rays of your kidney and bladder.  ? Ultrasound.  ? CT scan.  ? Cystoscopy. In this procedure, a health care provider inserts a tube with a light and camera (cystoscope) through the urethra and into the bladder in order to check for problems.  ? Urodynamic testing. These tests assess how well the bladder, urethra, and sphincter can store and release urine. There are different types of urodynamic tests, and they vary depending on what the test is measuring.  To help diagnose your condition, your health care provider may recommend that you keep a log of when you urinate and how much you urinate.  How is this treated?  Treatment for this condition depends on the type of incontinence that you have and its cause. Treatment may include:  · Lifestyle changes, such as:  ? Quitting smoking.  ? Maintaining a healthy weight.  ? Staying active. Try to get 150 minutes of moderate-intensity exercise every week. Ask your health care provider which activities are safe for you.  ? Eating a healthy diet.  § Avoid high-fat foods, like fried foods.  § Avoid refined carbohydrates like white bread and white rice.  § Limit how much alcohol and caffeine you drink.  § Increase your fiber intake. Foods such as fresh fruits, vegetables, beans, and whole grains are healthy sources of fiber.  · Pelvic floor muscle exercises.  · Bladder training, such as lengthening the amount of time between bathroom breaks, or using the bathroom at regular intervals.  · Using techniques to suppress bladder urges. This can include distraction techniques or controlled breathing exercises.  · Medicines to relax the bladder muscles and prevent bladder spasms.  · Medicines to help slow or prevent the  growth of a man's prostate.  · Botox injections. These can help relax the bladder muscles.  · Using pulses of electricity to help change bladder reflexes (electrical nerve stimulation).  · For women, using a medical device to prevent urine leaks. This is a small, tampon-like, disposable device that is inserted into the urethra.  · Injecting collagen or carbon beads (bulking agents) into the urinary sphincter. These can help thicken tissue and close the bladder opening.  · Surgery.  Follow these instructions at home:  Lifestyle  · Limit alcohol and caffeine. These can fill your bladder quickly and irritate it.  · Keep yourself clean to help prevent odors and skin damage. Ask your doctor about special skin creams and cleansers that can protect the skin from urine.  · Consider wearing pads or adult diapers. Make sure to change them regularly, and always change them right after experiencing incontinence.  General instructions  · Take over-the-counter and prescription medicines only as told by your health care provider.  · Use the bathroom about every 3-4 hours, even if you do not feel the need to urinate. Try to empty your bladder completely every time. After urinating, wait a minute. Then try to urinate again.  · Make sure you are in a relaxed position while urinating.  · If your incontinence is caused by nerve problems, keep a log of the medicines you take and the times you go to the bathroom.  · Keep all follow-up visits as told by your health care provider. This is important.  Contact a health care provider if:  · You have pain that gets worse.  · Your incontinence gets worse.  Get help right away if:  · You have a fever or chills.  · You are unable to urinate.  · You have redness in your groin area or down your legs.  Summary  · Urinary incontinence refers to a condition in which a person is unable to control where and when to pass urine.  · This condition may be caused by medicines, infection, weak bladder  muscles, weak pelvic floor muscles, enlargement of the prostate (in men), or surgery.  · The following factors increase your risk for developing this condition: older age, obesity, pregnancy and childbirth, menopause, neurological diseases, and chronic coughing.  · There are several types of urinary incontinence. They include urge incontinence, stress incontinence, overflow incontinence, and functional incontinence.  · This condition is usually treated first with lifestyle and behavioral changes, such as quitting smoking, eating a healthier diet, and doing regular pelvic floor exercises. Other treatment options include medicines, bulking agents, medical devices, electrical nerve stimulation, or surgery.  This information is not intended to replace advice given to you by your health care provider. Make sure you discuss any questions you have with your health care provider.  Document Released: 01/25/2006 Document Revised: 12/28/2018 Document Reviewed: 03/29/2018  [employer] Patient Education © 2020 [employer]          Kegel Exercises    Kegel exercises can help strengthen your pelvic floor muscles. The pelvic floor is a group of muscles that support your rectum, small intestine, and bladder. In females, pelvic floor muscles also help support the womb (uterus). These muscles help you control the flow of urine and stool.  Kegel exercises are painless and simple, and they do not require any equipment. Your provider may suggest Kegel exercises to:  · Improve bladder and bowel control.  · Improve sexual response.  · Improve weak pelvic floor muscles after surgery to remove the uterus (hysterectomy) or pregnancy (females).  · Improve weak pelvic floor muscles after prostate gland removal or surgery (males).  Kegel exercises involve squeezing your pelvic floor muscles, which are the same muscles you squeeze when you try to stop the flow of urine or keep from passing gas. The exercises can be done while sitting, standing, or  lying down, but it is best to vary your position.  Exercises  How to do Kegel exercises:  1. Squeeze your pelvic floor muscles tight. You should feel a tight lift in your rectal area. If you are a female, you should also feel a tightness in your vaginal area. Keep your stomach, buttocks, and legs relaxed.  2. Hold the muscles tight for up to 10 seconds.  3. Breathe normally.  4. Relax your muscles.  5. Repeat as told by your health care provider.  Repeat this exercise daily as told by your health care provider. Continue to do this exercise for at least 4-6 weeks, or for as long as told by your health care provider.  You may be referred to a physical therapist who can help you learn more about how to do Kegel exercises.  Depending on your condition, your health care provider may recommend:  · Varying how long you squeeze your muscles.  · Doing several sets of exercises every day.  · Doing exercises for several weeks.  · Making Kegel exercises a part of your regular exercise routine.  This information is not intended to replace advice given to you by your health care provider. Make sure you discuss any questions you have with your health care provider.  Document Released: 12/04/2013 Document Revised: 08/07/2019 Document Reviewed: 08/07/2019  [employer] Patient Education © 2020 [employer]

## 2022-04-15 NOTE — ASSESSMENT & PLAN NOTE
Patient reports stress incontinence that has been worsening since she had COVID 4 months ago.  Has to wear a pad.  Sometimes will just leak and not happen during a cough or sneeze.  Denies dysuria, hematuria.

## 2022-04-15 NOTE — ASSESSMENT & PLAN NOTE
Patient reports light menses for 5 weeks.  Reports periods are irregular.  Goes 5 to 6 months between menses.  Has never had a menses last this long.  She is wanting something to help stop her menses.  Denies abdominal pain, dyspareunia, vaginal discharge.

## 2022-04-26 ENCOUNTER — PATIENT MESSAGE (OUTPATIENT)
Dept: MEDICAL GROUP | Facility: PHYSICIAN GROUP | Age: 47
End: 2022-04-26

## 2022-04-26 DIAGNOSIS — N93.9 ABNORMAL UTERINE BLEEDING: ICD-10-CM

## 2022-05-27 ENCOUNTER — HOSPITAL ENCOUNTER (OUTPATIENT)
Facility: MEDICAL CENTER | Age: 47
End: 2022-05-27
Attending: NURSE PRACTITIONER
Payer: COMMERCIAL

## 2022-05-27 ENCOUNTER — OFFICE VISIT (OUTPATIENT)
Dept: MEDICAL GROUP | Facility: PHYSICIAN GROUP | Age: 47
End: 2022-05-27
Payer: COMMERCIAL

## 2022-05-27 VITALS
RESPIRATION RATE: 16 BRPM | OXYGEN SATURATION: 98 % | BODY MASS INDEX: 33.37 KG/M2 | WEIGHT: 200.3 LBS | TEMPERATURE: 98.4 F | HEIGHT: 65 IN | HEART RATE: 87 BPM | SYSTOLIC BLOOD PRESSURE: 104 MMHG | DIASTOLIC BLOOD PRESSURE: 72 MMHG

## 2022-05-27 DIAGNOSIS — N93.9 ABNORMAL UTERINE BLEEDING: ICD-10-CM

## 2022-05-27 DIAGNOSIS — R30.0 DYSURIA: ICD-10-CM

## 2022-05-27 DIAGNOSIS — Z12.4 CERVICAL CANCER SCREENING: ICD-10-CM

## 2022-05-27 PROCEDURE — 88175 CYTOPATH C/V AUTO FLUID REDO: CPT

## 2022-05-27 PROCEDURE — 87624 HPV HI-RISK TYP POOLED RSLT: CPT

## 2022-05-27 PROCEDURE — 99396 PREV VISIT EST AGE 40-64: CPT | Performed by: NURSE PRACTITIONER

## 2022-05-27 PROCEDURE — 99000 SPECIMEN HANDLING OFFICE-LAB: CPT | Performed by: NURSE PRACTITIONER

## 2022-05-27 ASSESSMENT — FIBROSIS 4 INDEX: FIB4 SCORE: 1.59

## 2022-05-27 NOTE — PROGRESS NOTES
SUBJECTIVE: 46 y.o. female for annual routine gynecologic exam and for the following problems.    Abnormal uterine bleeding  Patient trialed norethindrone, but caused worsening bleeding.  She tapered off and went a couple weeks without menses.  However, she started menses this morning.  She has been referred to gynecology and pelvic ultrasound was ordered.  Patient has been very busy at work and has not had time to schedule appointments.    Chief Complaint   Patient presents with   • Gynecologic Exam       OB History    Para Term  AB Living   1 1 0 0 0 0   SAB IAB Ectopic Molar Multiple Live Births   0 0 0 0 0 0      Last Pap:   Social History     Substance and Sexual Activity   Sexual Activity Yes   • Partners: Male   • Birth control/protection: Surgical     H/O Abnormal Pap yes, repeat okay, hpv in the past without treatment  She  reports that she has been smoking cigarettes. She has a 10.50 pack-year smoking history. She has never used smokeless tobacco.        Allergies: Codeine     ROS:    Menses every irregular   Cramping is variable.   She does take OTC analgesics for cramping  Reports mild menopause symptoms of hot flashes, night sweats, sleep disruption, mood changes, vaginal dryness.   No significant bloating/fluid retention, pelvic pain, or dyspareunia. No vaginal discharge. Positive dysuria for less than a week  No breast tenderness, mass, nipple discharge, changes in size or contour, or abnormal cyclic discomfort.  No abdominal pain, change in bowel habits, black or bloody stools.    No unusual headaches, no visual changes, menstrual migraines   No prolonged cough. No dyspnea or chest pain on exertion.  No new/concerning skin lesions, concerns.     Exercise: sporadic irregular exercise walks 4000 steps  Preventive Care:  Health Maintenance Topics with due status: Overdue       Topic Date Due    PAP SMEAR Never done    MAMMOGRAM 2020       Current medicines (including changes  today)  Current Outpatient Medications   Medication Sig Dispense Refill   • hydroxychloroquine (PLAQUENIL) 200 MG Tab Take 2 Tablets by mouth every day. 180 Tablet 3   • methotrexate 2.5 MG Tab Take 6 Tablets by mouth every 7 days. 90 Tablet 1   • pregabalin (LYRICA) 100 MG Cap Take 1 Capsule by mouth 2 times a day for 270 days. 180 Capsule 2   • norethindrone (MICRONOR) 0.35 MG tablet Take 1 Tablet by mouth every day. 84 Tablet 3   • cyclobenzaprine (FLEXERIL) 10 mg Tab Take 1 Tablet by mouth 1 time a day as needed for Muscle Spasms. 30 Tablet 6   • naproxen (NAPROSYN) 500 MG Tab Take 1 Tablet by mouth 2 times daily with meals as needed. 60 Tablet 5   • folic acid (FOLVITE) 1 MG Tab Take 1 Tablet by mouth every day. 90 Tablet 3   • prazosin (MINIPRESS) 2 MG Cap Take 1 Capsule by mouth every evening. 90 Capsule 1   • BENLYSTA 200 MG/ML Solution Auto-injector INJECT 1 PEN UNDER THE SKIN EVERY 7 DAYS. 3.92 mL 11   • traZODone (DESYREL) 100 MG Tab Take 1 Tablet by mouth at bedtime as needed. 90 Tablet 4   • lamoTRIgine (LAMICTAL) 100 MG Tab Take 1 Tablet by mouth every day. 90 Tablet 4   • ARIPiprazole (ABILIFY) 5 MG tablet Take 1 Tablet by mouth every day. 90 Tablet 4   • Lifitegrast (XIIDRA) 5 % Solution Administer  into affected eye(s).     • fluticasone (FLONASE) 50 MCG/ACT nasal spray Spray 1 Spray in nose every day.     • aspirin EC (ECOTRIN) 81 MG Tablet Delayed Response Take 81 mg by mouth every day.     • DiphenhydrAMINE HCl (BENADRYL ALLERGY PO) Take  by mouth.       No current facility-administered medications for this visit.     She  has a past medical history of Depression and Fibromyalgia.  She  has a past surgical history that includes knee arthroscopy; primary c section; tubal ligation; and dental extraction(s) (1996).     Family History:   Family History   Problem Relation Age of Onset   • Cancer Mother         uterine   • Other Mother         celiac   • Depression Father    • Lung Disease Father    •  "Bladder cancer Father    • Other Father         drug addict   • Heart Disease Sister    • Other Sister         mental retardation   • Bipolar disorder Brother    • Other Brother         addiction   • Breast Cancer Maternal Grandmother    • Other Maternal Grandmother         stroke   • Prostate cancer Maternal Grandfather    • Heart Disease Paternal Grandmother    • Heart Disease Paternal Grandfather    • Other Brother         paranoid schizophrenia, drug addiction       OBJECTIVE:    Exam: /72 (BP Location: Left arm, Patient Position: Sitting, BP Cuff Size: Large adult)   Pulse 87   Temp 36.9 °C (98.4 °F) (Temporal)   Resp 16   Ht 1.638 m (5' 4.5\")   Wt 90.9 kg (200 lb 4.8 oz)   SpO2 98%  Body mass index is 33.85 kg/m².    General: Alert, pleasant, well nourished, well developed female in NAD  Neck: Supple without bruit. Thyroid is not enlarged.  Pulmonary: Clear to ausculation.  Normal effort. No rales, ronchi, or wheezing.  Cardiovascular: Normal rate and rhythm without murmur.  No lower extremity edema.  Abdomen: Soft, nontender, nondistended. Normal bowel sounds. Liver and spleen are not palpable  Neurologic: Grossly nonfocal  Skin: Warm and dry.   Musculoskeletal: Normal gait.   Psych: Normal mood and affect. Alert and oriented. Judgment and insight is normal.     Breast Exam: Performed with instruction during examination. No axillary lymphadenopathy, no skin changes, no dominant masses. No nipple retraction  Pelvic Exam -  Normal external genitalia with no lesions. Normal vaginal mucosa with normal rugation and with minimal amount menses bleeding. Cervix with no visible lesions. No cervical motion tenderness. Uterus is enlarged with no masses. No adnexal tenderness or enlargement appreciated. Sure Path Pap obtained, and specimen(s) sent to lab      A chaperone was offered to the patient during today's exam. Patient declined chaperone.      <ASSESSMENT and PLAN>    1. Dysuria  Patient unable to " leave urine specimen today.  We will stop by lab later today.  - URINALYSIS; Future  - URINE CULTURE(NEW); Future    2. Cervical cancer screening  Patient started menses this morning, light bleeding.  Uterus mildly enlarged on exam.  Otherwise normal pelvic exam.  Will await Pap smear results.  - THINPREP PAP WITH HPV; Future    3. Abnormal uterine bleeding  Advised patient to get pelvic ultrasound and to schedule appointment to establish care with gynecology.  Contact information given to patient today.

## 2022-05-27 NOTE — ASSESSMENT & PLAN NOTE
Patient trialed norethindrone, but caused worsening bleeding.  She tapered off and went a couple weeks without menses.  However, she started menses this morning.  She has been referred to gynecology and pelvic ultrasound was ordered.  Patient has been very busy at work and has not had time to schedule appointments.

## 2022-05-27 NOTE — PATIENT INSTRUCTIONS
EBENEZER MAJANO MD  [address] [id number]  Phone: [phone]

## 2022-05-28 DIAGNOSIS — Z12.4 CERVICAL CANCER SCREENING: ICD-10-CM

## 2022-05-31 LAB
CYTOLOGY REG CYTOL: NORMAL
HPV HR 12 DNA CVX QL NAA+PROBE: NEGATIVE
HPV16 DNA SPEC QL NAA+PROBE: NEGATIVE
HPV18 DNA SPEC QL NAA+PROBE: NEGATIVE
SPECIMEN SOURCE: NORMAL

## 2022-06-14 ENCOUNTER — PATIENT MESSAGE (OUTPATIENT)
Dept: MEDICAL GROUP | Facility: PHYSICIAN GROUP | Age: 47
End: 2022-06-14

## 2022-06-14 DIAGNOSIS — Z11.1 SCREENING FOR TUBERCULOSIS: ICD-10-CM

## 2022-06-14 DIAGNOSIS — R76.11 POSITIVE REACTION TO TUBERCULIN SKIN TEST: ICD-10-CM

## 2022-06-24 ENCOUNTER — HOSPITAL ENCOUNTER (OUTPATIENT)
Dept: LAB | Facility: MEDICAL CENTER | Age: 47
End: 2022-06-24
Attending: NURSE PRACTITIONER
Payer: COMMERCIAL

## 2022-06-24 ENCOUNTER — HOSPITAL ENCOUNTER (OUTPATIENT)
Dept: LAB | Facility: MEDICAL CENTER | Age: 47
End: 2022-06-24
Attending: INTERNAL MEDICINE
Payer: COMMERCIAL

## 2022-06-24 ENCOUNTER — HOSPITAL ENCOUNTER (OUTPATIENT)
Dept: RADIOLOGY | Facility: MEDICAL CENTER | Age: 47
End: 2022-06-24
Attending: NURSE PRACTITIONER
Payer: COMMERCIAL

## 2022-06-24 DIAGNOSIS — M32.8 OTHER FORMS OF SYSTEMIC LUPUS ERYTHEMATOSUS, UNSPECIFIED ORGAN INVOLVEMENT STATUS (HCC): ICD-10-CM

## 2022-06-24 DIAGNOSIS — M05.80 POLYARTHRITIS WITH POSITIVE RHEUMATOID FACTOR (HCC): ICD-10-CM

## 2022-06-24 DIAGNOSIS — Z11.1 SCREENING FOR TUBERCULOSIS: ICD-10-CM

## 2022-06-24 DIAGNOSIS — R76.11 POSITIVE REACTION TO TUBERCULIN SKIN TEST: ICD-10-CM

## 2022-06-24 DIAGNOSIS — Z79.899 LONG-TERM USE OF PLAQUENIL: ICD-10-CM

## 2022-06-24 DIAGNOSIS — M35.00 SJOGREN'S SYNDROME, WITH UNSPECIFIED ORGAN INVOLVEMENT (HCC): ICD-10-CM

## 2022-06-24 DIAGNOSIS — Z79.631 LONG TERM METHOTREXATE USER: ICD-10-CM

## 2022-06-24 DIAGNOSIS — R30.0 DYSURIA: ICD-10-CM

## 2022-06-24 LAB
ALBUMIN SERPL BCP-MCNC: 4 G/DL (ref 3.2–4.9)
ALBUMIN/GLOB SERPL: 1.1 G/DL
ALP SERPL-CCNC: 92 U/L (ref 30–99)
ALT SERPL-CCNC: 25 U/L (ref 2–50)
ANION GAP SERPL CALC-SCNC: 11 MMOL/L (ref 7–16)
ANISOCYTOSIS BLD QL SMEAR: ABNORMAL
APPEARANCE UR: CLEAR
APPEARANCE UR: CLEAR
AST SERPL-CCNC: 31 U/L (ref 12–45)
BASOPHILS # BLD AUTO: 0.8 % (ref 0–1.8)
BASOPHILS # BLD: 0.02 K/UL (ref 0–0.12)
BILIRUB SERPL-MCNC: 0.3 MG/DL (ref 0.1–1.5)
BILIRUB UR QL STRIP.AUTO: NEGATIVE
BILIRUB UR QL STRIP.AUTO: NEGATIVE
BUN SERPL-MCNC: 9 MG/DL (ref 8–22)
C3 SERPL-MCNC: 91 MG/DL (ref 87–200)
C4 SERPL-MCNC: 11.7 MG/DL (ref 19–52)
CALCIUM SERPL-MCNC: 8.8 MG/DL (ref 8.5–10.5)
CHLORIDE SERPL-SCNC: 103 MMOL/L (ref 96–112)
CO2 SERPL-SCNC: 22 MMOL/L (ref 20–33)
COLOR UR: YELLOW
COLOR UR: YELLOW
CREAT SERPL-MCNC: 0.78 MG/DL (ref 0.5–1.4)
CRP SERPL HS-MCNC: 0.46 MG/DL (ref 0–0.75)
EOSINOPHIL # BLD AUTO: 0.09 K/UL (ref 0–0.51)
EOSINOPHIL NFR BLD: 4.3 % (ref 0–6.9)
ERYTHROCYTE [DISTWIDTH] IN BLOOD BY AUTOMATED COUNT: 51.5 FL (ref 35.9–50)
ERYTHROCYTE [SEDIMENTATION RATE] IN BLOOD BY WESTERGREN METHOD: 64 MM/HOUR (ref 0–25)
GFR SERPLBLD CREATININE-BSD FMLA CKD-EPI: 95 ML/MIN/1.73 M 2
GLOBULIN SER CALC-MCNC: 3.5 G/DL (ref 1.9–3.5)
GLUCOSE SERPL-MCNC: 78 MG/DL (ref 65–99)
GLUCOSE UR STRIP.AUTO-MCNC: NEGATIVE MG/DL
GLUCOSE UR STRIP.AUTO-MCNC: NEGATIVE MG/DL
HCT VFR BLD AUTO: 40.5 % (ref 37–47)
HGB BLD-MCNC: 13.6 G/DL (ref 12–16)
KETONES UR STRIP.AUTO-MCNC: NEGATIVE MG/DL
KETONES UR STRIP.AUTO-MCNC: NEGATIVE MG/DL
LEUKOCYTE ESTERASE UR QL STRIP.AUTO: NEGATIVE
LEUKOCYTE ESTERASE UR QL STRIP.AUTO: NEGATIVE
LG PLATELETS BLD QL SMEAR: NORMAL
LYMPHOCYTES # BLD AUTO: 0.87 K/UL (ref 1–4.8)
LYMPHOCYTES NFR BLD: 43.6 % (ref 22–41)
MACROCYTES BLD QL SMEAR: ABNORMAL
MANUAL DIFF BLD: NORMAL
MCH RBC QN AUTO: 33.4 PG (ref 27–33)
MCHC RBC AUTO-ENTMCNC: 33.6 G/DL (ref 33.6–35)
MCV RBC AUTO: 99.5 FL (ref 81.4–97.8)
MICRO URNS: NORMAL
MICRO URNS: NORMAL
MONOCYTES # BLD AUTO: 0.22 K/UL (ref 0–0.85)
MONOCYTES NFR BLD AUTO: 11.1 % (ref 0–13.4)
MORPHOLOGY BLD-IMP: NORMAL
NEUTROPHILS # BLD AUTO: 0.8 K/UL (ref 2–7.15)
NEUTROPHILS NFR BLD: 40.2 % (ref 44–72)
NITRITE UR QL STRIP.AUTO: NEGATIVE
NITRITE UR QL STRIP.AUTO: NEGATIVE
NRBC # BLD AUTO: 0 K/UL
NRBC BLD-RTO: 0 /100 WBC
PH UR STRIP.AUTO: 6.5 [PH] (ref 5–8)
PH UR STRIP.AUTO: 6.5 [PH] (ref 5–8)
PLATELET # BLD AUTO: 155 K/UL (ref 164–446)
PLATELET BLD QL SMEAR: NORMAL
PMV BLD AUTO: 10.7 FL (ref 9–12.9)
POTASSIUM SERPL-SCNC: 4.3 MMOL/L (ref 3.6–5.5)
PROT SERPL-MCNC: 7.5 G/DL (ref 6–8.2)
PROT UR QL STRIP: NEGATIVE MG/DL
PROT UR QL STRIP: NEGATIVE MG/DL
RBC # BLD AUTO: 4.07 M/UL (ref 4.2–5.4)
RBC BLD AUTO: PRESENT
RBC UR QL AUTO: NEGATIVE
RBC UR QL AUTO: NEGATIVE
SODIUM SERPL-SCNC: 136 MMOL/L (ref 135–145)
SP GR UR STRIP.AUTO: 1.01
SP GR UR STRIP.AUTO: 1.01
UROBILINOGEN UR STRIP.AUTO-MCNC: 0.2 MG/DL
UROBILINOGEN UR STRIP.AUTO-MCNC: 0.2 MG/DL
WBC # BLD AUTO: 2 K/UL (ref 4.8–10.8)

## 2022-06-24 PROCEDURE — 86140 C-REACTIVE PROTEIN: CPT

## 2022-06-24 PROCEDURE — 85652 RBC SED RATE AUTOMATED: CPT

## 2022-06-24 PROCEDURE — 80053 COMPREHEN METABOLIC PANEL: CPT

## 2022-06-24 PROCEDURE — 86160 COMPLEMENT ANTIGEN: CPT

## 2022-06-24 PROCEDURE — 85025 COMPLETE CBC W/AUTO DIFF WBC: CPT

## 2022-06-24 PROCEDURE — 87086 URINE CULTURE/COLONY COUNT: CPT

## 2022-06-24 PROCEDURE — 36415 COLL VENOUS BLD VENIPUNCTURE: CPT

## 2022-06-24 PROCEDURE — 81003 URINALYSIS AUTO W/O SCOPE: CPT

## 2022-06-24 PROCEDURE — 81003 URINALYSIS AUTO W/O SCOPE: CPT | Mod: 91

## 2022-06-24 PROCEDURE — 85007 BL SMEAR W/DIFF WBC COUNT: CPT

## 2022-06-24 PROCEDURE — 71046 X-RAY EXAM CHEST 2 VIEWS: CPT

## 2022-06-26 LAB
BACTERIA UR CULT: NORMAL
SIGNIFICANT IND 70042: NORMAL
SITE SITE: NORMAL
SOURCE SOURCE: NORMAL

## 2022-06-28 ENCOUNTER — OFFICE VISIT (OUTPATIENT)
Dept: NEUROLOGY | Facility: MEDICAL CENTER | Age: 47
End: 2022-06-28
Attending: PSYCHIATRY & NEUROLOGY
Payer: COMMERCIAL

## 2022-06-28 VITALS
RESPIRATION RATE: 18 BRPM | DIASTOLIC BLOOD PRESSURE: 70 MMHG | SYSTOLIC BLOOD PRESSURE: 104 MMHG | HEART RATE: 76 BPM | OXYGEN SATURATION: 97 %

## 2022-06-28 DIAGNOSIS — G43.709 CHRONIC MIGRAINE W/O AURA W/O STATUS MIGRAINOSUS, NOT INTRACTABLE: Primary | ICD-10-CM

## 2022-06-28 PROCEDURE — 700111 HCHG RX REV CODE 636 W/ 250 OVERRIDE (IP): Performed by: PSYCHIATRY & NEUROLOGY

## 2022-06-28 PROCEDURE — 64615 CHEMODENERV MUSC MIGRAINE: CPT

## 2022-06-28 PROCEDURE — 64615 CHEMODENERV MUSC MIGRAINE: CPT | Performed by: PSYCHIATRY & NEUROLOGY

## 2022-06-28 RX ADMIN — ONABOTULINUMTOXINA 200 UNITS: 200 INJECTION, POWDER, LYOPHILIZED, FOR SOLUTION INTRADERMAL; INTRAMUSCULAR at 09:25

## 2022-07-26 DIAGNOSIS — F43.10 POSTTRAUMATIC STRESS DISORDER: ICD-10-CM

## 2022-07-26 RX ORDER — PRAZOSIN HYDROCHLORIDE 2 MG/1
2 CAPSULE ORAL EVERY EVENING
Qty: 90 CAPSULE | Refills: 1 | Status: SHIPPED | OUTPATIENT
Start: 2022-07-26 | End: 2022-08-25 | Stop reason: SDUPTHER

## 2022-08-11 DIAGNOSIS — F43.10 POSTTRAUMATIC STRESS DISORDER: ICD-10-CM

## 2022-08-11 RX ORDER — PRAZOSIN HYDROCHLORIDE 1 MG/1
1 CAPSULE ORAL DAILY
Qty: 30 CAPSULE | Refills: 1 | Status: SHIPPED | OUTPATIENT
Start: 2022-08-11 | End: 2022-08-25 | Stop reason: SDUPTHER

## 2022-08-25 ENCOUNTER — TELEMEDICINE (OUTPATIENT)
Dept: BEHAVIORAL HEALTH | Facility: CLINIC | Age: 47
End: 2022-08-25
Payer: COMMERCIAL

## 2022-08-25 DIAGNOSIS — F41.0 PANIC ATTACKS: ICD-10-CM

## 2022-08-25 DIAGNOSIS — F33.1 MODERATE EPISODE OF RECURRENT MAJOR DEPRESSIVE DISORDER (HCC): ICD-10-CM

## 2022-08-25 DIAGNOSIS — F43.10 POSTTRAUMATIC STRESS DISORDER: ICD-10-CM

## 2022-08-25 DIAGNOSIS — G47.09 OTHER INSOMNIA: ICD-10-CM

## 2022-08-25 DIAGNOSIS — F41.1 GENERALIZED ANXIETY DISORDER: ICD-10-CM

## 2022-08-25 PROCEDURE — 90833 PSYTX W PT W E/M 30 MIN: CPT | Mod: 95 | Performed by: PSYCHIATRY & NEUROLOGY

## 2022-08-25 PROCEDURE — 99214 OFFICE O/P EST MOD 30 MIN: CPT | Mod: 95 | Performed by: PSYCHIATRY & NEUROLOGY

## 2022-08-25 RX ORDER — PRAZOSIN HYDROCHLORIDE 1 MG/1
1 CAPSULE ORAL DAILY
Qty: 30 CAPSULE | Refills: 5 | Status: SHIPPED | OUTPATIENT
Start: 2022-08-25 | End: 2022-10-03

## 2022-08-25 RX ORDER — TRAZODONE HYDROCHLORIDE 100 MG/1
100 TABLET ORAL NIGHTLY PRN
Qty: 90 TABLET | Refills: 5 | Status: SHIPPED | OUTPATIENT
Start: 2022-08-25 | End: 2023-06-08 | Stop reason: SDUPTHER

## 2022-08-25 RX ORDER — PRAZOSIN HYDROCHLORIDE 2 MG/1
2 CAPSULE ORAL EVERY EVENING
Qty: 90 CAPSULE | Refills: 5 | Status: SHIPPED | OUTPATIENT
Start: 2022-08-25 | End: 2022-10-03 | Stop reason: SDUPTHER

## 2022-08-25 RX ORDER — LORAZEPAM 1 MG/1
1 TABLET ORAL
Qty: 30 TABLET | Refills: 2 | Status: SHIPPED | OUTPATIENT
Start: 2022-08-25 | End: 2022-09-24

## 2022-08-25 RX ORDER — ARIPIPRAZOLE 5 MG/1
5 TABLET ORAL DAILY
Qty: 90 TABLET | Refills: 5 | Status: SHIPPED | OUTPATIENT
Start: 2022-08-25 | End: 2022-09-27

## 2022-08-25 RX ORDER — LAMOTRIGINE 100 MG/1
100 TABLET ORAL DAILY
Qty: 90 TABLET | Refills: 5 | Status: SHIPPED | OUTPATIENT
Start: 2022-08-25 | End: 2023-06-08 | Stop reason: SDUPTHER
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
## 2022-09-06 DIAGNOSIS — G43.709 CHRONIC MIGRAINE W/O AURA W/O STATUS MIGRAINOSUS, NOT INTRACTABLE: ICD-10-CM

## 2022-09-25 DIAGNOSIS — F33.1 MODERATE EPISODE OF RECURRENT MAJOR DEPRESSIVE DISORDER (HCC): ICD-10-CM

## 2022-09-27 RX ORDER — ARIPIPRAZOLE 5 MG/1
5 TABLET ORAL DAILY
Qty: 90 TABLET | Refills: 4 | Status: SHIPPED | OUTPATIENT
Start: 2022-09-27 | End: 2023-06-08 | Stop reason: SDUPTHER

## 2022-09-29 ENCOUNTER — OFFICE VISIT (OUTPATIENT)
Dept: NEUROLOGY | Facility: MEDICAL CENTER | Age: 47
End: 2022-09-29
Attending: PSYCHIATRY & NEUROLOGY
Payer: COMMERCIAL

## 2022-09-29 VITALS
OXYGEN SATURATION: 97 % | HEIGHT: 64 IN | RESPIRATION RATE: 17 BRPM | BODY MASS INDEX: 34.85 KG/M2 | DIASTOLIC BLOOD PRESSURE: 68 MMHG | HEART RATE: 80 BPM | TEMPERATURE: 97.8 F | WEIGHT: 204.15 LBS | SYSTOLIC BLOOD PRESSURE: 112 MMHG

## 2022-09-29 DIAGNOSIS — G43.709 CHRONIC MIGRAINE W/O AURA W/O STATUS MIGRAINOSUS, NOT INTRACTABLE: Primary | ICD-10-CM

## 2022-09-29 PROCEDURE — 64615 CHEMODENERV MUSC MIGRAINE: CPT | Performed by: PSYCHIATRY & NEUROLOGY

## 2022-09-29 PROCEDURE — 700111 HCHG RX REV CODE 636 W/ 250 OVERRIDE (IP): Performed by: PSYCHIATRY & NEUROLOGY

## 2022-09-29 PROCEDURE — 700101 HCHG RX REV CODE 250: Performed by: PSYCHIATRY & NEUROLOGY

## 2022-09-29 RX ORDER — RIMEGEPANT SULFATE 75 MG/75MG
75 TABLET, ORALLY DISINTEGRATING ORAL
COMMUNITY
Start: 2022-09-16

## 2022-09-29 RX ADMIN — SODIUM CHLORIDE 155 UNITS: 9 INJECTION INTRAMUSCULAR; INTRAVENOUS; SUBCUTANEOUS at 08:33

## 2022-09-29 ASSESSMENT — FIBROSIS 4 INDEX: FIB4 SCORE: 1.8

## 2022-10-03 ENCOUNTER — OFFICE VISIT (OUTPATIENT)
Dept: BEHAVIORAL HEALTH | Facility: CLINIC | Age: 47
End: 2022-10-03
Payer: COMMERCIAL

## 2022-10-03 VITALS — WEIGHT: 203 LBS | BODY MASS INDEX: 34.84 KG/M2

## 2022-10-03 DIAGNOSIS — F33.1 MDD (MAJOR DEPRESSIVE DISORDER), RECURRENT EPISODE, MODERATE (HCC): ICD-10-CM

## 2022-10-03 DIAGNOSIS — F41.1 GENERALIZED ANXIETY DISORDER: ICD-10-CM

## 2022-10-03 DIAGNOSIS — F43.10 POSTTRAUMATIC STRESS DISORDER: ICD-10-CM

## 2022-10-03 DIAGNOSIS — Z63.4 BEREAVEMENT: ICD-10-CM

## 2022-10-03 PROCEDURE — 99215 OFFICE O/P EST HI 40 MIN: CPT | Performed by: PSYCHIATRY & NEUROLOGY

## 2022-10-03 PROCEDURE — 99417 PROLNG OP E/M EACH 15 MIN: CPT | Performed by: PSYCHIATRY & NEUROLOGY

## 2022-10-03 RX ORDER — PRAZOSIN HYDROCHLORIDE 2 MG/1
2 CAPSULE ORAL 2 TIMES DAILY
Qty: 60 CAPSULE | Refills: 1 | Status: SHIPPED | OUTPATIENT
Start: 2022-10-03 | End: 2022-11-17 | Stop reason: SDUPTHER

## 2022-10-03 RX ORDER — FLUOXETINE 10 MG/1
10 CAPSULE ORAL EVERY MORNING
Qty: 30 CAPSULE | Refills: 1 | Status: SHIPPED | OUTPATIENT
Start: 2022-10-03 | End: 2022-11-17 | Stop reason: SDUPTHER

## 2022-10-03 SDOH — SOCIAL STABILITY - SOCIAL INSECURITY: DISSAPEARANCE AND DEATH OF FAMILY MEMBER: Z63.4

## 2022-10-03 ASSESSMENT — FIBROSIS 4 INDEX: FIB4 SCORE: 1.84
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
## 2022-11-17 ENCOUNTER — TELEMEDICINE (OUTPATIENT)
Dept: BEHAVIORAL HEALTH | Facility: CLINIC | Age: 47
End: 2022-11-17
Payer: COMMERCIAL

## 2022-11-17 DIAGNOSIS — F33.1 MDD (MAJOR DEPRESSIVE DISORDER), RECURRENT EPISODE, MODERATE (HCC): ICD-10-CM

## 2022-11-17 DIAGNOSIS — F43.10 POSTTRAUMATIC STRESS DISORDER: ICD-10-CM

## 2022-11-17 DIAGNOSIS — Z63.4 BEREAVEMENT: ICD-10-CM

## 2022-11-17 DIAGNOSIS — F41.1 GENERALIZED ANXIETY DISORDER: ICD-10-CM

## 2022-11-17 PROCEDURE — 99214 OFFICE O/P EST MOD 30 MIN: CPT | Mod: GT | Performed by: PSYCHIATRY & NEUROLOGY

## 2022-11-17 RX ORDER — PRAZOSIN HYDROCHLORIDE 2 MG/1
2 CAPSULE ORAL 2 TIMES DAILY
Qty: 180 CAPSULE | Refills: 0 | Status: SHIPPED | OUTPATIENT
Start: 2022-11-17 | End: 2023-06-08 | Stop reason: SDUPTHER

## 2022-11-17 RX ORDER — FLUOXETINE HYDROCHLORIDE 20 MG/1
20 CAPSULE ORAL EVERY MORNING
Qty: 30 CAPSULE | Refills: 1 | Status: SHIPPED | OUTPATIENT
Start: 2022-11-17 | End: 2023-02-13 | Stop reason: SDUPTHER

## 2022-11-17 SDOH — SOCIAL STABILITY - SOCIAL INSECURITY: DISSAPEARANCE AND DEATH OF FAMILY MEMBER: Z63.4

## 2022-11-17 NOTE — PROGRESS NOTES
PSYCHIATRY VIRTUAL VISIT FOLLOW-UP NOTE      Chief Complaint   Patient presents with    Follow-Up     Depression, anxiety     This evaluation was conducted via Zoom using secure and encrypted videoconferencing technology.   The patient was in their home in the [facility].    The patient's identity was confirmed and verbal consent was obtained for this virtual visit.    History Of Present Illness:  Augusta Allred is a 47 y.o. female with major depressive disorder, generalized anxiety disorder, PTSD, fibromyalgia, SLE, Sjogren's syndrome comes in today for follow up, was last seen 6 weeks ago.  She is doing all right in regards to her mental health.  She has been compliant with Prozac but so far has not noticed any efficacy or side effects.  She has been taking the higher dose of Prazosin and has noticed that it has improved her intrusive thoughts.  She is trying to find a balance between work and home life.  She is still working over 150 hours in 2 weeks.  She has been staying in touch with family and friends feels that she is moving forward in terms of grief related to her son's death.  She does not have an answer from the corner in regards to his cause of death.  She mentions that holidays were never back at her family and she is not worried about having a tough time through the holidays.  She is looking forward to a trip to [place] with her .  She has been compliant with all of her medications.  She has been struggling with more fatigue and pain but has been sleeping more.  She continues to have fleeting thoughts of death but denies intrusive thoughts or having active thoughts, intent or plan of wanting to hurt herself.    Social History:   She is ,  x 2,  x 1, 26 yo son from first marriage  in 2022,  for [employer], lives with  in [place]    Substance Use:  Alcohol - Drinks in social settings only, once every few months  Nicotine - Smokes 1  "PPD  Cannabis - Edible cannabis few times month  Illicit drugs - Denies     Past Medication Trials:  Zoloft (s/e - \"amped me too much\"), Celexa (ineffective), Lexapro (ineffective), Effexor, Wellbutrin (s/e - suicidal ideations), Remeron (ineffective), Nefazodone (ineffective), Lithium, Depakote, Tegretol, Hydroxyzine, Xanax    Medications:  Current Outpatient Medications   Medication Sig Dispense Refill    prazosin (MINIPRESS) 2 MG Cap Take 1 Capsule by mouth 2 times a day. 60 Capsule 1    FLUoxetine (PROZAC) 10 MG Cap Take 1 Capsule by mouth every morning. 30 Capsule 1    NURTEC 75 MG TABLET DISPERSIBLE Take 1 Tablet by mouth 1 time a day as needed.      ARIPiprazole (ABILIFY) 5 MG tablet TAKE 1 TABLET BY MOUTH EVERY DAY 90 Tablet 4    predniSONE (DELTASONE) 5 MG Tab 20mg (4 tabs) daily for 3 days, then 15mg (3 tabs) daily for 5 days, then 10mg (2 tabs) daily for 3 days, then 5mg (1 tab) daily for 3 days then stop. Take in AM with food. 30 Tablet 0    methotrexate 2.5 MG Tab Take 6 Tablets by mouth every 7 days. 90 Tablet 1    Belimumab (BENLYSTA) 200 MG/ML Solution Auto-injector INJECT 1 PEN UNDER THE SKIN EVERY 7 DAYS. 3.92 mL 11    traZODone (DESYREL) 100 MG Tab Take 1 Tablet by mouth at bedtime as needed for Sleep. 90 Tablet 5    lamoTRIgine (LAMICTAL) 100 MG Tab Take 1 Tablet by mouth every day. 90 Tablet 5    hydroxychloroquine (PLAQUENIL) 200 MG Tab Take 2 Tablets by mouth every day. 180 Tablet 3    pregabalin (LYRICA) 100 MG Cap Take 1 Capsule by mouth 2 times a day for 270 days. 180 Capsule 2    cyclobenzaprine (FLEXERIL) 10 mg Tab Take 1 Tablet by mouth 1 time a day as needed for Muscle Spasms. 30 Tablet 6    naproxen (NAPROSYN) 500 MG Tab Take 1 Tablet by mouth 2 times daily with meals as needed. 60 Tablet 5    folic acid (FOLVITE) 1 MG Tab Take 1 Tablet by mouth every day. 90 Tablet 3    Lifitegrast (XIIDRA) 5 % Solution Administer  into affected eye(s) 2 times a day.      fluticasone (FLONASE) 50 " "MCG/ACT nasal spray Administer 1 Spray into affected nostril(S) every day.      aspirin EC (ECOTRIN) 81 MG Tablet Delayed Response Take 1 Tablet by mouth every day.      DiphenhydrAMINE HCl (BENADRYL ALLERGY PO) Take  by mouth.       No current facility-administered medications for this visit.     Review Of Systems:    Constitutional - Positive for fatigue  Psychiatric - Positive for depression, anxiety    Physical Examination:  Vital signs: There were no vitals taken for this visit.    Musculoskeletal: Normal gait. No abnormal movements.     Mental Status Evaluation:   General: Middle aged female, dressed in casual attire, good grooming and hygiene, in no apparent distress, calm and cooperative, good eye contact, psychomotor retardation  Orientation: Alert and oriented to person, place and time  Recent and remote memory: Grossly intact  Attention span and concentration: Grossly intact  Speech: Spontaneous, normal rate, rhythm and tone  Thought Process: Linear, logical and goal directed  Thought Content: Denies active suicidal or homicidal ideations, intent or plan  Perception: No delusions noted  Associations: Intact  Language: Appropriate  Fund of knowledge and vocabulary: Grossly adequate  Mood: \"busy but okay\"  Affect: Dysphoric, mood congruent  Insight: Good  Judgment: Good    Depression screenin/17/2020     2:30 PM 10/5/2021     9:00 AM 2022    10:40 AM   Depression Screen (PHQ-2/PHQ-9)   PHQ-2 Total Score 6 2 2   PHQ-9 Total Score 22 8 7     Interpretation of PHQ-9 Total Score   Score Severity   1-4 No Depression   5-9 Mild Depression   10-14 Moderate Depression   15-19 Moderately Severe Depression   20-27 Severe Depression    Anxiety screenin/29/2020     3:36 PM 2020     1:35 PM   SAMUEL 7   Total Score 18    SAMUEL-7 Total Score  16     Interpretation of SAMUEL 7 Total Score   Score Severity:  0-4 No Anxiety   5-9 Mild Anxiety  10-14 Moderate Anxiety  15-21 Severe Anxiety    Medical " Records/Labs/Diagnostic Tests Reviewed:  [place] PDMP records - appropriate refills, no abuse suspected       Impression:  1.  Major depressive disorder, recurrent, moderate - stable  2.  Generalized anxiety disorder - stable  3.  Posttraumatic stress disorder (childhood and adult trauma, found son unresponsive and did CPR) - stable  4.  Bereavement (25-year-old son  in 2022) - stable    Plan:  1.  Increase Prozac to 20 mg daily for depression and anxiety  2.  Continue Prazosin 2 mg twice daily for PTSD  3.  Continue Lamictal 100 mg in the morning for mood stabilization  4.  Continue Abilify 5 mg in the morning for depression augmentation  -Metabolic monitoring (2022): Lipid profile with low HDL, A1c normal  -Repeat labs due in 2023  5.  Continue Ativan 1 mg daily as needed for anxiety.  Not prescribed today  6.  Continue Trazodone 100 mg at bedtime as needed for sleep  7.  She continues to be not interested in psychotherapy.  She has tried therapy in the past and it has never been beneficial for her.  She feels that staying connected with family and friends has been helping her move forward in terms of grief.    Return to clinic in 2 months or sooner if symptoms worsen    The proposed treatment plan was discussed with the patient who was provided the opportunity to ask questions and make suggestions regarding alternative treatment. Patient verbalized understanding and expressed agreement with the plan.    Genevieve Calderón M.D.  22    This note was created using voice recognition software (Dragon). The accuracy of the dictation is limited by the abilities of the software. I have reviewed the note prior to signing, however some errors in grammar and context are still possible. If you have any questions related to this note please do not hesitate to contact our office.

## 2023-01-17 ENCOUNTER — APPOINTMENT (OUTPATIENT)
Dept: BEHAVIORAL HEALTH | Facility: CLINIC | Age: 48
End: 2023-01-17
Payer: COMMERCIAL

## 2023-03-10 ENCOUNTER — OFFICE VISIT (OUTPATIENT)
Dept: MEDICAL GROUP | Facility: PHYSICIAN GROUP | Age: 48
End: 2023-03-10
Payer: COMMERCIAL

## 2023-03-10 VITALS
RESPIRATION RATE: 20 BRPM | HEART RATE: 83 BPM | WEIGHT: 203.7 LBS | DIASTOLIC BLOOD PRESSURE: 70 MMHG | SYSTOLIC BLOOD PRESSURE: 118 MMHG | HEIGHT: 65 IN | OXYGEN SATURATION: 98 % | BODY MASS INDEX: 33.94 KG/M2 | TEMPERATURE: 97.7 F

## 2023-03-10 DIAGNOSIS — M79.7 FIBROMYALGIA: ICD-10-CM

## 2023-03-10 DIAGNOSIS — Z63.4 BEREAVEMENT: ICD-10-CM

## 2023-03-10 DIAGNOSIS — F33.1 MDD (MAJOR DEPRESSIVE DISORDER), RECURRENT EPISODE, MODERATE (HCC): ICD-10-CM

## 2023-03-10 DIAGNOSIS — Z13.29 SCREENING FOR THYROID DISORDER: ICD-10-CM

## 2023-03-10 DIAGNOSIS — E66.9 OBESITY (BMI 30-39.9): ICD-10-CM

## 2023-03-10 DIAGNOSIS — Z13.6 SCREENING FOR CARDIOVASCULAR CONDITION: ICD-10-CM

## 2023-03-10 DIAGNOSIS — M54.2 NECK PAIN: ICD-10-CM

## 2023-03-10 DIAGNOSIS — Z13.21 ENCOUNTER FOR VITAMIN DEFICIENCY SCREENING: ICD-10-CM

## 2023-03-10 PROCEDURE — 99214 OFFICE O/P EST MOD 30 MIN: CPT | Performed by: NURSE PRACTITIONER

## 2023-03-10 RX ORDER — CYCLOBENZAPRINE HCL 10 MG
10 TABLET ORAL
Qty: 30 TABLET | Refills: 6 | Status: SHIPPED | OUTPATIENT
Start: 2023-03-10

## 2023-03-10 RX ORDER — PREGABALIN 75 MG/1
75 CAPSULE ORAL 2 TIMES DAILY
Qty: 180 CAPSULE | Refills: 0 | Status: SHIPPED | OUTPATIENT
Start: 2023-03-10 | End: 2023-06-23 | Stop reason: SDUPTHER

## 2023-03-10 SDOH — SOCIAL STABILITY - SOCIAL INSECURITY: DISSAPEARANCE AND DEATH OF FAMILY MEMBER: Z63.4

## 2023-03-10 ASSESSMENT — FIBROSIS 4 INDEX: FIB4 SCORE: 1.84

## 2023-03-10 ASSESSMENT — PATIENT HEALTH QUESTIONNAIRE - PHQ9
SUM OF ALL RESPONSES TO PHQ QUESTIONS 1-9: 16
CLINICAL INTERPRETATION OF PHQ2 SCORE: 6
5. POOR APPETITE OR OVEREATING: 0 - NOT AT ALL

## 2023-03-10 NOTE — ASSESSMENT & PLAN NOTE
Patient reports her son passed away unexpectedly in 7/2022.  While grieving, stopped most of her medications for a while.  She is ready to get back on track.  She is continuing to see psychiatry and rheumatology.  Has recently started her rheumatology medications again.  Also taking her psychiatric meds.

## 2023-03-10 NOTE — ASSESSMENT & PLAN NOTE
Chronic intermittent problem.  Continues with intermittent neck pain and stiffness that is relieved with cyclobenzaprine.  Requesting refill today.

## 2023-03-10 NOTE — ASSESSMENT & PLAN NOTE
Chronic health problem that is usually well controlled with Lyrica 100 mg twice a day and cyclobenzaprine as needed.  However, patient has been without medications for couple months.  She had stopped medications and did not pursue refilling them after the loss of her son.  Please see additional notes.  Without Lyrica, patient has had severe myalgias and fatigue.

## 2023-03-10 NOTE — PROGRESS NOTES
CC: Medication refill, lab request    HISTORY OF THE PRESENT ILLNESS: Patient is a 47 y.o. female. This pleasant patient is here today for evaluation and management of the following health problems.      Bereavement  Patient reports her son passed away unexpectedly in 7/2022.  While grieving, stopped most of her medications for a while.  She is ready to get back on track.  She is continuing to see psychiatry and rheumatology.  Has recently started her rheumatology medications again.  Also taking her psychiatric meds.    Fibromyalgia  Chronic health problem that is usually well controlled with Lyrica 100 mg twice a day and cyclobenzaprine as needed.  However, patient has been without medications for couple months.  She had stopped medications and did not pursue refilling them after the loss of her son.  Please see additional notes.  Without Lyrica, patient has had severe myalgias and fatigue.    Neck pain  Chronic intermittent problem.  Continues with intermittent neck pain and stiffness that is relieved with cyclobenzaprine.  Requesting refill today.    Allergies: Codeine    Current Outpatient Medications Ordered in Epic   Medication Sig Dispense Refill    pregabalin (LYRICA) 75 MG Cap Take 1 Capsule by mouth 2 times a day for 90 days. 180 Capsule 0    cyclobenzaprine (FLEXERIL) 10 mg Tab Take 1 Tablet by mouth 1 time a day as needed for Muscle Spasms. 30 Tablet 6    naproxen (NAPROSYN) 500 MG Tab Take 1 Tablet by mouth 2 times daily with meals as needed (pain). 60 Tablet 5    hydroxychloroquine (PLAQUENIL) 200 MG Tab Take 2 Tablets by mouth every day. 180 Tablet 3    methotrexate 2.5 MG Tab Take 6 Tablets by mouth every 7 days. 90 Tablet 1    FLUoxetine (PROZAC) 20 MG Cap Take 1 Capsule by mouth every morning. 30 Capsule 1    prazosin (MINIPRESS) 2 MG Cap Take 1 Capsule by mouth 2 times a day. 180 Capsule 0    NURTEC 75 MG TABLET DISPERSIBLE Take 1 Tablet by mouth 1 time a day as needed.      ARIPiprazole (ABILIFY)  5 MG tablet TAKE 1 TABLET BY MOUTH EVERY DAY 90 Tablet 4    Belimumab (BENLYSTA) 200 MG/ML Solution Auto-injector INJECT 1 PEN UNDER THE SKIN EVERY 7 DAYS. 3.92 mL 11    traZODone (DESYREL) 100 MG Tab Take 1 Tablet by mouth at bedtime as needed for Sleep. 90 Tablet 5    lamoTRIgine (LAMICTAL) 100 MG Tab Take 1 Tablet by mouth every day. 90 Tablet 5    folic acid (FOLVITE) 1 MG Tab Take 1 Tablet by mouth every day. 90 Tablet 3    Lifitegrast (XIIDRA) 5 % Solution Administer  into affected eye(s) 2 times a day.      aspirin EC (ECOTRIN) 81 MG Tablet Delayed Response Take 1 Tablet by mouth every day.      DiphenhydrAMINE HCl (BENADRYL ALLERGY PO) Take  by mouth.      predniSONE (DELTASONE) 5 MG Tab 20mg (4 tabs) daily for 3 days, then 15mg (3 tabs) daily for 5 days, then 10mg (2 tabs) daily for 3 days, then 5mg (1 tab) daily for 3 days then stop. Take in AM with food. (Patient not taking: Reported on 3/3/2023) 30 Tablet 0    fluticasone (FLONASE) 50 MCG/ACT nasal spray Administer 1 Spray into affected nostril(S) every day. (Patient not taking: Reported on 3/3/2023)       No current [facility]-ordered facility-administered medications on file.       Past Medical History:   Diagnosis Date    Depression     Fibromyalgia        Past Surgical History:   Procedure Laterality Date    DENTAL EXTRACTION(S)  1996    KNEE ARTHROSCOPY      PRIMARY C SECTION      TUBAL LIGATION         Social History     Tobacco Use    Smoking status: Every Day     Packs/day: 0.50     Years: 21.00     Pack years: 10.50     Types: Cigarettes    Smokeless tobacco: Never   Vaping Use    Vaping Use: Never used   Substance Use Topics    Alcohol use: No     Comment: rare    Drug use: Yes     Types: Marijuana, Oral     Comment: topical lotion with THC for fibromyalgia       Family History   Problem Relation Age of Onset    Cancer Mother         uterine    Other Mother         celiac    Depression Father     Lung Disease Father     Bladder cancer Father      "Other Father         drug addict    Heart Disease Sister     Other Sister         mental retardation    Bipolar disorder Brother     Other Brother         addiction    Other Brother         paranoid schizophrenia, drug addiction    Prostate cancer Maternal Grandfather     Breast Cancer Maternal Grandmother     Other Maternal Grandmother         stroke    Heart Disease Paternal Grandfather     Heart Disease Paternal Grandmother        ROS: As in HPI, otherwise negative for chest pain, dyspnea, abdominal pain, dysuria, blood in stool, fever         Exam: /70   Pulse 83   Temp 36.5 °C (97.7 °F) (Temporal)   Resp 20   Ht 1.645 m (5' 4.75\")   Wt 92.4 kg (203 lb 11.2 oz)   SpO2 98%  Body mass index is 34.16 kg/m².    General: Alert, pleasant, well nourished, well developed female in NAD  HEENT: Normocephalic. Eyes conjunctiva clear lids without ptosis, pupils equal and reactive to light, ears normal shape and contour, canals are clear bilaterally, tympanic membranes are pearly gray with good light reflex, nasal mucosa without erythema and drainage, oropharynx is without erythema, edema or exudates.   Neck: Supple without bruit. Thyroid is not enlarged.  Pulmonary: Clear to ausculation.  Normal effort. No rales, ronchi, or wheezing.  Cardiovascular: Normal rate and rhythm without murmur. Carotid and radial pulses are intact and equal bilaterally.  No lower extremity edema.  Abdomen: Soft, nontender, nondistended. Normal bowel sounds. Liver and spleen are not palpable  Neurologic: Grossly nonfocal  Lymph: No cervical or supraclavicular lymph nodes are palpable  Skin: Warm and dry.    Musculoskeletal: Normal gait.   Psych: Normal mood and affect. Alert and oriented. Judgment and insight is normal.    Please note that this dictation was created using voice recognition software. I have made every reasonable attempt to correct obvious errors, but I expect that there are errors of grammar and possibly content that I " did not discover before finalizing the note.      Assessment/Plan  1. Fibromyalgia  Has been out of Lyrica for couple months.  Will restart at 75 mg twice a day.  Advised to start with 1 tablet once a day for a week, then increase to 1 tab twice daily.   reviewed.  - pregabalin (LYRICA) 75 MG Cap; Take 1 Capsule by mouth 2 times a day for 90 days.  Dispense: 180 Capsule; Refill: 0  - cyclobenzaprine (FLEXERIL) 10 mg Tab; Take 1 Tablet by mouth 1 time a day as needed for Muscle Spasms.  Dispense: 30 Tablet; Refill: 6    2. Neck pain  Continue with lifestyle measures and cyclobenzaprine as needed.  - cyclobenzaprine (FLEXERIL) 10 mg Tab; Take 1 Tablet by mouth 1 time a day as needed for Muscle Spasms.  Dispense: 30 Tablet; Refill: 6    3. Screening for cardiovascular condition    - Lipid Profile; Future    4. Encounter for vitamin deficiency screening    - VITAMIN D,25 HYDROXY (DEFICIENCY); Future    5. Screening for thyroid disorder    - TSH WITH REFLEX TO FT4; Future    6. Bereavement  Offered condolences improve provided support.  Patient will continue working with psychiatry.    7. MDD (major depressive disorder), recurrent episode, moderate (HCC)  As in #6  - Patient has been identified as having a positive depression screening. Appropriate orders and counseling have been given.     8. Obesity (BMI 30-39.9)    - Patient identified as having weight management issue.  Appropriate orders and counseling given.     Patient will return to clinic in 3 months either virtually or in person for lab review and medication refill or sooner if needed.

## 2023-06-08 ENCOUNTER — TELEMEDICINE (OUTPATIENT)
Dept: BEHAVIORAL HEALTH | Facility: CLINIC | Age: 48
End: 2023-06-08
Payer: COMMERCIAL

## 2023-06-08 DIAGNOSIS — F43.10 POSTTRAUMATIC STRESS DISORDER: ICD-10-CM

## 2023-06-08 DIAGNOSIS — Z63.4 BEREAVEMENT: ICD-10-CM

## 2023-06-08 DIAGNOSIS — F51.04 CHRONIC INSOMNIA: ICD-10-CM

## 2023-06-08 DIAGNOSIS — F33.0 MDD (MAJOR DEPRESSIVE DISORDER), RECURRENT EPISODE, MILD (HCC): ICD-10-CM

## 2023-06-08 DIAGNOSIS — F41.1 GENERALIZED ANXIETY DISORDER: ICD-10-CM

## 2023-06-08 DIAGNOSIS — Z79.899 ENCOUNTER FOR LONG-TERM (CURRENT) USE OF MEDICATIONS: ICD-10-CM

## 2023-06-08 PROBLEM — F33.1 MDD (MAJOR DEPRESSIVE DISORDER), RECURRENT EPISODE, MODERATE (HCC): Status: RESOLVED | Noted: 2018-10-30 | Resolved: 2023-06-08

## 2023-06-08 PROCEDURE — 99214 OFFICE O/P EST MOD 30 MIN: CPT | Mod: GT | Performed by: PSYCHIATRY & NEUROLOGY

## 2023-06-08 RX ORDER — PRAZOSIN HYDROCHLORIDE 1 MG/1
CAPSULE ORAL
Qty: 270 CAPSULE | Refills: 0 | Status: SHIPPED | OUTPATIENT
Start: 2023-06-08 | End: 2023-09-07

## 2023-06-08 RX ORDER — TRAZODONE HYDROCHLORIDE 100 MG/1
100 TABLET ORAL
Qty: 90 TABLET | Refills: 0 | Status: SHIPPED | OUTPATIENT
Start: 2023-06-08 | End: 2023-12-12 | Stop reason: SDUPTHER

## 2023-06-08 RX ORDER — ARIPIPRAZOLE 5 MG/1
5 TABLET ORAL EVERY MORNING
Qty: 90 TABLET | Refills: 0 | Status: SHIPPED | OUTPATIENT
Start: 2023-06-08 | End: 2023-09-14 | Stop reason: SDUPTHER

## 2023-06-08 RX ORDER — LAMOTRIGINE 100 MG/1
100 TABLET ORAL EVERY MORNING
Qty: 90 TABLET | Refills: 0 | Status: SHIPPED | OUTPATIENT
Start: 2023-06-08 | End: 2023-09-14 | Stop reason: SDUPTHER

## 2023-06-08 RX ORDER — FLUOXETINE HYDROCHLORIDE 40 MG/1
40 CAPSULE ORAL DAILY
Qty: 90 CAPSULE | Refills: 0 | Status: SHIPPED | OUTPATIENT
Start: 2023-06-08 | End: 2023-09-14 | Stop reason: SDUPTHER

## 2023-06-08 RX ORDER — LORAZEPAM 1 MG/1
.5-1 TABLET ORAL
Qty: 30 TABLET | Refills: 0 | Status: SHIPPED | OUTPATIENT
Start: 2023-06-08 | End: 2023-07-08

## 2023-06-08 SDOH — SOCIAL STABILITY - SOCIAL INSECURITY: DISSAPEARANCE AND DEATH OF FAMILY MEMBER: Z63.4

## 2023-06-08 NOTE — PROGRESS NOTES
PSYCHIATRY VIRTUAL VISIT FOLLOW-UP NOTE    Chief Complaint   Patient presents with    Follow-Up     Depression, anxiety     This evaluation was conducted via Zoom using secure and encrypted videoconferencing technology.   The patient was in their home in the [facility].    The patient's identity was confirmed and verbal consent was obtained for this virtual visit.    History Of Present Illness:  Augusta Allred is a 47 y.o. female with major depressive disorder, generalized anxiety disorder, PTSD, fibromyalgia, SLE, Sjogren's syndrome comes in today for follow up, was last seen over 6 months ago.  She has been doing all right in regards to her mental health.  She continues to have struggles but feels that she has been managing her stress better than before.  She has been compliant with Prozac and has noticed some benefits with it.  She has been staying really busy at work which can be overwhelming at times.  She is looking forward to a trip to [place] with  and friends later in the year.  She continues to be working on grief related to her son's death.  She did get a final report from the  and she was not able to give a cause of death.  She continues to stay in touch with her friends and that has been helpful.  She denies any side effects from her current medications.  She endorses infrequent flashbacks and nightmares.  She feels that the morning dose of prazosin does help with anxiety but is interested in trying a lower dose.  She continues to have infrequent fleeting thoughts of death but denies active thoughts, intent or plan of wanting to hurt herself.    Social History:   She is ,  x 2,  x 1, 24 yo son from first marriage  in 2022,  for [employer], lives with  in [place].    Substance Use:  Alcohol - Drinks in social settings only, once every few months  Nicotine - Smokes 1/2 PPD  Cannabis - Edible cannabis few times month, helps with  "pain  Illicit drugs - Denies     Past Medication Trials:  Zoloft (s/e - \"amped me too much\"), Celexa (ineffective), Lexapro (ineffective), Effexor, Wellbutrin (s/e - suicidal ideations), Remeron (ineffective), Nefazodone (ineffective), Lithium, Depakote, Tegretol, Hydroxyzine, Xanax    Medications:  Current Outpatient Medications   Medication Sig Dispense Refill    predniSONE (DELTASONE) 5 MG Tab 20mg (4 tabs) daily for 3 days, then 15mg (3 tabs) daily for 5 days, then 10mg (2 tabs) daily for 3 days, then 5mg (1 tab) daily for 3 days then stop. Take in AM with food. 30 Tablet 0    Belimumab (BENLYSTA) 200 MG/ML Solution Auto-injector INJECT 1 PEN UNDER THE SKIN EVERY 7 DAYS. 3.92 mL 11    pregabalin (LYRICA) 75 MG Cap Take 1 Capsule by mouth 2 times a day for 90 days. 180 Capsule 0    cyclobenzaprine (FLEXERIL) 10 mg Tab Take 1 Tablet by mouth 1 time a day as needed for Muscle Spasms. 30 Tablet 6    naproxen (NAPROSYN) 500 MG Tab Take 1 Tablet by mouth 2 times daily with meals as needed (pain). 60 Tablet 5    hydroxychloroquine (PLAQUENIL) 200 MG Tab Take 2 Tablets by mouth every day. 180 Tablet 3    methotrexate 2.5 MG Tab Take 6 Tablets by mouth every 7 days. 90 Tablet 1    FLUoxetine (PROZAC) 20 MG Cap Take 1 Capsule by mouth every morning. 30 Capsule 1    prazosin (MINIPRESS) 2 MG Cap Take 1 Capsule by mouth 2 times a day. 180 Capsule 0    NURTEC 75 MG TABLET DISPERSIBLE Take 1 Tablet by mouth 1 time a day as needed.      ARIPiprazole (ABILIFY) 5 MG tablet TAKE 1 TABLET BY MOUTH EVERY DAY 90 Tablet 4    traZODone (DESYREL) 100 MG Tab Take 1 Tablet by mouth at bedtime as needed for Sleep. 90 Tablet 5    lamoTRIgine (LAMICTAL) 100 MG Tab Take 1 Tablet by mouth every day. 90 Tablet 5    folic acid (FOLVITE) 1 MG Tab Take 1 Tablet by mouth every day. 90 Tablet 3    Lifitegrast (XIIDRA) 5 % Solution Administer  into affected eye(s) 2 times a day.      fluticasone (FLONASE) 50 MCG/ACT nasal spray Administer 1 Spray " "into affected nostril(S) every day.      aspirin EC (ECOTRIN) 81 MG Tablet Delayed Response Take 1 Tablet by mouth every day.      DiphenhydrAMINE HCl (BENADRYL ALLERGY PO) Take  by mouth.       No current facility-administered medications for this visit.     Review Of Systems:    Constitutional - Positive for fatigue  Psychiatric - Positive for depression, anxiety    Physical Examination:  Vital signs: There were no vitals taken for this visit.    Musculoskeletal:No abnormal movements.     Mental Status Evaluation:   General: Middle aged female, dressed in casual attire, good grooming and hygiene, in no apparent distress, calm and cooperative, good eye contact, psychomotor retardation  Orientation: Alert and oriented to person, place and time  Recent and remote memory: Grossly intact  Attention span and concentration: Grossly intact  Speech: Spontaneous, normal rate, rhythm and tone  Thought Process: Linear, logical and goal directed  Thought Content: Denies active suicidal or homicidal ideations, intent or plan  Perception: No delusions noted  Associations: Intact  Language: Appropriate  Fund of knowledge and vocabulary: Grossly adequate  Mood: \"good\"  Affect: Dysphoric, mood congruent  Insight: Good  Judgment: Good    Depression screening:      10/5/2021     9:00 AM 2022    10:40 AM 3/10/2023    11:40 AM   Depression Screen (PHQ-2/PHQ-9)   PHQ-2 Total Score 2 2 6   PHQ-9 Total Score 8 7 16     Interpretation of PHQ-9 Total Score   Score Severity   1-4 No Depression   5-9 Mild Depression   10-14 Moderate Depression   15-19 Moderately Severe Depression   20-27 Severe Depression    Anxiety screenin/29/2020     3:36 PM 2020     1:35 PM   SAMUEL 7   Total Score 18    SAMUEL-7 Total Score  16     Interpretation of SAMUEL 7 Total Score   Score Severity:  0-4 No Anxiety   5-9 Mild Anxiety  10-14 Moderate Anxiety  15-21 Severe Anxiety    Medical Records/Labs/Diagnostic Tests Reviewed:  [place] [facility] records - " appropriate refills, no abuse suspected       Impression:  1.  Major depressive disorder, recurrent, mild - slight improvement   2.  Generalized anxiety disorder - stable  3.  Post traumatic stress disorder (childhood and adult trauma, found son unresponsive and did CPR) - stable  4.  Bereavement (25-year-old son  in 2022) - stable  5.  Chronic insomnia - stable  6.  Long-term use of medications - Abilify    Plan:  1.  Increase Prozac to 40 mg in the morning for depression and anxiety  2.  Decrease Prazosin to 1 mg in the morning and 2 mg twice daily for PTSD per patient request  3.  Continue Lamictal 100 mg in the morning for mood stabilization  4.  Continue Abilify 5 mg in the morning for depression augmentation  -Metabolic monitoring (2022): Lipid profile with low HDL, A1c normal  -Yearly metabolic monitoring labs due: A1c ordered, lipid profile ordered by PCP  5.  Continue Ativan 0.5 to 1 mg daily as needed for anxiety.  E-prescribed x 30 days with no refills  6.  Continue Trazodone 100 mg at bedtime for sleep    Return to clinic in 3 months or sooner if symptoms worsen    The proposed treatment plan was discussed with the patient who was provided the opportunity to ask questions and make suggestions regarding alternative treatment. Patient verbalized understanding and expressed agreement with the plan.    Genevieve Calderón M.D.  23    This note was created using voice recognition software (Dragon). The accuracy of the dictation is limited by the abilities of the software. I have reviewed the note prior to signing, however some errors in grammar and context are still possible. If you have any questions related to this note please do not hesitate to contact our office.

## 2023-06-09 ENCOUNTER — HOSPITAL ENCOUNTER (OUTPATIENT)
Dept: LAB | Facility: MEDICAL CENTER | Age: 48
End: 2023-06-09
Attending: INTERNAL MEDICINE
Payer: COMMERCIAL

## 2023-06-09 DIAGNOSIS — Z79.899 HIGH RISK MEDICATION USE: ICD-10-CM

## 2023-06-09 DIAGNOSIS — M32.8 OTHER FORMS OF SYSTEMIC LUPUS ERYTHEMATOSUS, UNSPECIFIED ORGAN INVOLVEMENT STATUS (HCC): Chronic | ICD-10-CM

## 2023-06-09 DIAGNOSIS — Z13.6 SCREENING FOR CARDIOVASCULAR CONDITION: ICD-10-CM

## 2023-06-09 DIAGNOSIS — M35.00 SJOGREN'S SYNDROME, WITH UNSPECIFIED ORGAN INVOLVEMENT (HCC): ICD-10-CM

## 2023-06-09 DIAGNOSIS — Z79.899 ENCOUNTER FOR LONG-TERM (CURRENT) USE OF MEDICATIONS: ICD-10-CM

## 2023-06-09 DIAGNOSIS — Z13.29 SCREENING FOR THYROID DISORDER: ICD-10-CM

## 2023-06-09 DIAGNOSIS — Z79.631 LONG TERM METHOTREXATE USER: ICD-10-CM

## 2023-06-09 DIAGNOSIS — Z13.21 ENCOUNTER FOR VITAMIN DEFICIENCY SCREENING: ICD-10-CM

## 2023-06-09 LAB
25(OH)D3 SERPL-MCNC: 29 NG/ML (ref 30–100)
ALBUMIN SERPL BCP-MCNC: 4 G/DL (ref 3.2–4.9)
ALBUMIN/GLOB SERPL: 1.1 G/DL
ALP SERPL-CCNC: 95 U/L (ref 30–99)
ALT SERPL-CCNC: 46 U/L (ref 2–50)
ANION GAP SERPL CALC-SCNC: 11 MMOL/L (ref 7–16)
APPEARANCE UR: CLEAR
AST SERPL-CCNC: 32 U/L (ref 12–45)
BACTERIA #/AREA URNS HPF: NEGATIVE /HPF
BASOPHILS # BLD AUTO: 0.5 % (ref 0–1.8)
BASOPHILS # BLD: 0.02 K/UL (ref 0–0.12)
BILIRUB SERPL-MCNC: 0.3 MG/DL (ref 0.1–1.5)
BILIRUB UR QL STRIP.AUTO: NEGATIVE
BUN SERPL-MCNC: 14 MG/DL (ref 8–22)
C3 SERPL-MCNC: 83.5 MG/DL (ref 87–200)
C4 SERPL-MCNC: 6 MG/DL (ref 19–52)
CALCIUM ALBUM COR SERPL-MCNC: 8.6 MG/DL (ref 8.5–10.5)
CALCIUM SERPL-MCNC: 8.6 MG/DL (ref 8.5–10.5)
CAOX CRY #/AREA URNS HPF: NORMAL /HPF
CHLORIDE SERPL-SCNC: 106 MMOL/L (ref 96–112)
CHOLEST SERPL-MCNC: 144 MG/DL (ref 100–199)
CO2 SERPL-SCNC: 25 MMOL/L (ref 20–33)
COLOR UR: YELLOW
CREAT SERPL-MCNC: 0.71 MG/DL (ref 0.5–1.4)
CRP SERPL HS-MCNC: <0.3 MG/DL (ref 0–0.75)
EOSINOPHIL # BLD AUTO: 0.04 K/UL (ref 0–0.51)
EOSINOPHIL NFR BLD: 0.9 % (ref 0–6.9)
EPI CELLS #/AREA URNS HPF: NORMAL /HPF
ERYTHROCYTE [DISTWIDTH] IN BLOOD BY AUTOMATED COUNT: 51.7 FL (ref 35.9–50)
ERYTHROCYTE [SEDIMENTATION RATE] IN BLOOD BY WESTERGREN METHOD: 16 MM/HOUR (ref 0–25)
FASTING STATUS PATIENT QL REPORTED: NORMAL
GFR SERPLBLD CREATININE-BSD FMLA CKD-EPI: 106 ML/MIN/1.73 M 2
GLOBULIN SER CALC-MCNC: 3.5 G/DL (ref 1.9–3.5)
GLUCOSE SERPL-MCNC: 91 MG/DL (ref 65–99)
GLUCOSE UR STRIP.AUTO-MCNC: NEGATIVE MG/DL
HCT VFR BLD AUTO: 40.8 % (ref 37–47)
HDLC SERPL-MCNC: 45 MG/DL
HGB BLD-MCNC: 14 G/DL (ref 12–16)
HYALINE CASTS #/AREA URNS LPF: NORMAL /LPF
IMM GRANULOCYTES # BLD AUTO: 0.02 K/UL (ref 0–0.11)
IMM GRANULOCYTES NFR BLD AUTO: 0.5 % (ref 0–0.9)
KETONES UR STRIP.AUTO-MCNC: NEGATIVE MG/DL
LDLC SERPL CALC-MCNC: 65 MG/DL
LEUKOCYTE ESTERASE UR QL STRIP.AUTO: ABNORMAL
LYMPHOCYTES # BLD AUTO: 1.39 K/UL (ref 1–4.8)
LYMPHOCYTES NFR BLD: 32.3 % (ref 22–41)
MCH RBC QN AUTO: 34.3 PG (ref 27–33)
MCHC RBC AUTO-ENTMCNC: 34.3 G/DL (ref 32.2–35.5)
MCV RBC AUTO: 100 FL (ref 81.4–97.8)
MICRO URNS: ABNORMAL
MONOCYTES # BLD AUTO: 0.41 K/UL (ref 0–0.85)
MONOCYTES NFR BLD AUTO: 9.5 % (ref 0–13.4)
NEUTROPHILS # BLD AUTO: 2.43 K/UL (ref 1.82–7.42)
NEUTROPHILS NFR BLD: 56.3 % (ref 44–72)
NITRITE UR QL STRIP.AUTO: NEGATIVE
NRBC # BLD AUTO: 0 K/UL
NRBC BLD-RTO: 0 /100 WBC (ref 0–0.2)
PH UR STRIP.AUTO: 6 [PH] (ref 5–8)
PLATELET # BLD AUTO: 184 K/UL (ref 164–446)
PMV BLD AUTO: 10.5 FL (ref 9–12.9)
POTASSIUM SERPL-SCNC: 3.7 MMOL/L (ref 3.6–5.5)
PROT SERPL-MCNC: 7.5 G/DL (ref 6–8.2)
PROT UR QL STRIP: NEGATIVE MG/DL
RBC # BLD AUTO: 4.08 M/UL (ref 4.2–5.4)
RBC # URNS HPF: NORMAL /HPF
RBC UR QL AUTO: NEGATIVE
SODIUM SERPL-SCNC: 142 MMOL/L (ref 135–145)
SP GR UR STRIP.AUTO: 1.02
TRIGL SERPL-MCNC: 169 MG/DL (ref 0–149)
TSH SERPL DL<=0.005 MIU/L-ACNC: 1.24 UIU/ML (ref 0.38–5.33)
UROBILINOGEN UR STRIP.AUTO-MCNC: 1 MG/DL
WBC # BLD AUTO: 4.3 K/UL (ref 4.8–10.8)
WBC #/AREA URNS HPF: NORMAL /HPF

## 2023-06-09 PROCEDURE — 84443 ASSAY THYROID STIM HORMONE: CPT

## 2023-06-09 PROCEDURE — 36415 COLL VENOUS BLD VENIPUNCTURE: CPT

## 2023-06-09 PROCEDURE — 86160 COMPLEMENT ANTIGEN: CPT

## 2023-06-09 PROCEDURE — 85652 RBC SED RATE AUTOMATED: CPT

## 2023-06-09 PROCEDURE — 81001 URINALYSIS AUTO W/SCOPE: CPT

## 2023-06-09 PROCEDURE — 82570 ASSAY OF URINE CREATININE: CPT

## 2023-06-09 PROCEDURE — 84156 ASSAY OF PROTEIN URINE: CPT

## 2023-06-09 PROCEDURE — 85025 COMPLETE CBC W/AUTO DIFF WBC: CPT

## 2023-06-09 PROCEDURE — 80053 COMPREHEN METABOLIC PANEL: CPT

## 2023-06-09 PROCEDURE — 82306 VITAMIN D 25 HYDROXY: CPT

## 2023-06-09 PROCEDURE — 86140 C-REACTIVE PROTEIN: CPT

## 2023-06-09 PROCEDURE — 83036 HEMOGLOBIN GLYCOSYLATED A1C: CPT

## 2023-06-09 PROCEDURE — 80061 LIPID PANEL: CPT

## 2023-06-10 LAB
CREAT UR-MCNC: 158.3 MG/DL
EST. AVERAGE GLUCOSE BLD GHB EST-MCNC: 100 MG/DL
HBA1C MFR BLD: 5.1 % (ref 4–5.6)
PROT UR-MCNC: 11 MG/DL (ref 0–15)
PROT/CREAT UR: 69 MG/G (ref 10–107)

## 2023-06-20 ENCOUNTER — PATIENT MESSAGE (OUTPATIENT)
Dept: MEDICAL GROUP | Facility: PHYSICIAN GROUP | Age: 48
End: 2023-06-20
Payer: COMMERCIAL

## 2023-06-20 DIAGNOSIS — M79.7 FIBROMYALGIA: ICD-10-CM

## 2023-06-20 NOTE — PATIENT COMMUNICATION
lAST OV 3/10/23    Received request via: Patient    Was the patient seen in the last year in this department? Yes    Does the patient have an active prescription (recently filled or refills available) for medication(s) requested? No    Does the patient have FPC Plus and need 100 day supply (blood pressure, diabetes and cholesterol meds only)? Patient does not have SCP

## 2023-06-21 RX ORDER — PREGABALIN 100 MG/1
100 CAPSULE ORAL 2 TIMES DAILY
Qty: 180 CAPSULE | Refills: 2 | OUTPATIENT
Start: 2023-06-21 | End: 2024-03-17

## 2023-06-23 ENCOUNTER — TELEMEDICINE (OUTPATIENT)
Dept: MEDICAL GROUP | Facility: PHYSICIAN GROUP | Age: 48
End: 2023-06-23
Payer: COMMERCIAL

## 2023-06-23 VITALS — BODY MASS INDEX: 34.99 KG/M2 | HEIGHT: 65 IN | WEIGHT: 210 LBS

## 2023-06-23 DIAGNOSIS — M32.9 SLE (SYSTEMIC LUPUS ERYTHEMATOSUS RELATED SYNDROME) (HCC): ICD-10-CM

## 2023-06-23 DIAGNOSIS — M79.7 FIBROMYALGIA: ICD-10-CM

## 2023-06-23 PROBLEM — R74.8 ELEVATED SERUM ALKALINE PHOSPHATASE LEVEL: Status: RESOLVED | Noted: 2018-07-10 | Resolved: 2023-06-23

## 2023-06-23 PROBLEM — R73.09 ELEVATED GLUCOSE: Status: RESOLVED | Noted: 2021-10-06 | Resolved: 2023-06-23

## 2023-06-23 PROCEDURE — 99213 OFFICE O/P EST LOW 20 MIN: CPT | Mod: 95 | Performed by: NURSE PRACTITIONER

## 2023-06-23 RX ORDER — PREGABALIN 75 MG/1
75 CAPSULE ORAL 2 TIMES DAILY
Qty: 180 CAPSULE | Refills: 1 | Status: SHIPPED | OUTPATIENT
Start: 2023-06-23 | End: 2023-12-20

## 2023-06-23 ASSESSMENT — FIBROSIS 4 INDEX: FIB4 SCORE: 1.18

## 2023-06-23 NOTE — PROGRESS NOTES
Virtual Visit: Established Patient   This visit was conducted via Zoom using secure and encrypted videoconferencing technology.   The patient was in their home in the [facility].    The patient's identity was confirmed and verbal consent was obtained for this virtual visit.     Subjective:   CC:   Chief Complaint   Patient presents with    Medication Refill     lyrica       Augusta Allred is a 47 y.o. female presenting for evaluation and management of:    Fibromyalgia  This visit is being conducted by video. This is a chronic health problem that is well controlled with current medications and lifestyle measures.  Taking Lyrica 75 mg twice daily.  Has been having a flare for the last week or 2.  Met with her rheumatologist yesterday and determined it was likely a lupus flare.    Reportedly will take the Benlysta another 6 months to really take effect.  In the meantime, patient is now taking a prednisone taper that will last until approximately October.  Tolerating Lyrica, denies adverse effects.  Requesting refill today.         SLE (systemic lupus erythematosus related syndrome) (HCC)  Please see additional notes under fibromyalgia.     ROS   As in HPI, otherwise negative for chest pain, dyspnea, fever      Current medicines (including changes today)  Current Outpatient Medications   Medication Sig Dispense Refill    pregabalin (LYRICA) 75 MG Cap Take 1 Capsule by mouth 2 times a day for 180 days. 180 Capsule 1    Cholecalciferol (VITAMIN D3 PO)       predniSONE (DELTASONE) 5 MG Tab 20mg (4 tabs) daily for 1 week, then 15mg (3 tabs) daily for 1 week, then 10mg (2 tabs) daily for 1 week, then 5mg (1 tab) daily until follow up appt 130 Tablet 1    methotrexate 2.5 MG Tab Take 6 Tablets by mouth every 7 days. 90 Tablet 1    fluoxetine (PROZAC) 40 MG capsule Take 1 Capsule by mouth every day. 90 Capsule 0    traZODone (DESYREL) 100 MG Tab Take 1 Tablet by mouth at bedtime. 90 Tablet 0    prazosin (MINIPRESS)  1 MG Cap Take 1 Capsule by mouth every morning AND 2 Capsules at bedtime. 270 Capsule 0    ARIPiprazole (ABILIFY) 5 MG tablet Take 1 Tablet by mouth every morning. 90 Tablet 0    lamoTRIgine (LAMICTAL) 100 MG Tab Take 1 Tablet by mouth every morning. 90 Tablet 0    LORazepam (ATIVAN) 1 MG Tab Take 0.5-1 Tablets by mouth 1 time a day as needed for Anxiety for up to 30 days. 30 Tablet 0    Belimumab (BENLYSTA) 200 MG/ML Solution Auto-injector INJECT 1 PEN UNDER THE SKIN EVERY 7 DAYS. 3.92 mL 11    cyclobenzaprine (FLEXERIL) 10 mg Tab Take 1 Tablet by mouth 1 time a day as needed for Muscle Spasms. 30 Tablet 6    naproxen (NAPROSYN) 500 MG Tab Take 1 Tablet by mouth 2 times daily with meals as needed (pain). 60 Tablet 5    hydroxychloroquine (PLAQUENIL) 200 MG Tab Take 2 Tablets by mouth every day. 180 Tablet 3    NURTEC 75 MG TABLET DISPERSIBLE Take 1 Tablet by mouth 1 time a day as needed.      folic acid (FOLVITE) 1 MG Tab Take 1 Tablet by mouth every day. 90 Tablet 3    Lifitegrast (XIIDRA) 5 % Solution Administer  into affected eye(s) 2 times a day.      fluticasone (FLONASE) 50 MCG/ACT nasal spray Administer 1 Spray into affected nostril(S) every day.      aspirin EC (ECOTRIN) 81 MG Tablet Delayed Response Take 1 Tablet by mouth every day.       No current facility-administered medications for this visit.       Patient Active Problem List    Diagnosis Date Noted    MDD (major depressive disorder), recurrent episode, mild ([facility]) 06/08/2023    Obesity (BMI 30-39.9) 03/10/2023    Bereavement 10/03/2022    Abnormal uterine bleeding 04/15/2022    Stress incontinence 04/15/2022    COVID-19 12/29/2021    Leah-menopausal 03/23/2021    Chronic intractable headache 03/23/2021    Cigarette nicotine dependence without complication 03/23/2021    Sjogren's syndrome ([facility]) 12/05/2019    Neck pain 12/05/2019    SLE (systemic lupus erythematosus related syndrome) ([facility]) 10/20/2018    Fibromyalgia 07/06/2018    Seasonal allergies  "07/06/2018    Chronic insomnia 07/06/2018    PTSD (post-traumatic stress disorder) 07/06/2018        Objective:   Ht 1.645 m (5' 4.75\")   Wt 95.3 kg (210 lb)   BMI 35.22 kg/m²     Physical Exam:  Constitutional: Alert, no distress, well-groomed.  Skin: No rashes in visible areas.  Eye: Round. Conjunctiva clear, lids normal. No icterus.   ENMT: Lips pink without lesions, good dentition, moist mucous membranes. Phonation normal.  Neck: No masses, no thyromegaly. Moves freely without pain.  Respiratory: Unlabored respiratory effort, no cough or audible wheeze  Psych: Alert and oriented x3, normal affect and mood.     Assessment and Plan:   The following treatment plan was discussed:   1. Fibromyalgia  Well-controlled.  Continue with Lyrica at current dose.  Will prescribe 6 months as I am leaving the practice and would like to give patient some time to establish with a new provider.   reviewed.  - pregabalin (LYRICA) 75 MG Cap; Take 1 Capsule by mouth 2 times a day for 180 days.  Dispense: 180 Capsule; Refill: 1    2. SLE (systemic lupus erythematosus related syndrome) (HCC)  Continue management with rheumatology.    Lab results reviewed with patient.  CMP unremarkable.  Cholesterol shows mildly elevated triglycerides, TSH normal.  Vitamin D mildly low and patient starting supplement daily.      Follow-up:   I explained to patient today that I will be leaving the practice.  Offered follow-up appointment as new to you patient with another provider in [place].  Patient will call to schedule                 "

## 2023-06-23 NOTE — ASSESSMENT & PLAN NOTE
This visit is being conducted by video. This is a chronic health problem that is well controlled with current medications and lifestyle measures.  Taking Lyrica 75 mg twice daily.  Has been having a flare for the last week or 2.  Met with her rheumatologist yesterday and determined it was likely a lupus flare.    Reportedly will take the Benlysta another 6 months to really take effect.  In the meantime, patient is now taking a prednisone taper that will last until approximately October.  Tolerating Lyrica, denies adverse effects.  Requesting refill today.

## 2023-09-14 ENCOUNTER — TELEMEDICINE (OUTPATIENT)
Dept: BEHAVIORAL HEALTH | Facility: CLINIC | Age: 48
End: 2023-09-14
Payer: COMMERCIAL

## 2023-09-14 DIAGNOSIS — F43.10 POSTTRAUMATIC STRESS DISORDER: ICD-10-CM

## 2023-09-14 DIAGNOSIS — F33.0 MDD (MAJOR DEPRESSIVE DISORDER), RECURRENT EPISODE, MILD (HCC): ICD-10-CM

## 2023-09-14 DIAGNOSIS — F51.04 CHRONIC INSOMNIA: ICD-10-CM

## 2023-09-14 DIAGNOSIS — F41.1 GAD (GENERALIZED ANXIETY DISORDER): ICD-10-CM

## 2023-09-14 DIAGNOSIS — Z63.4 BEREAVEMENT: ICD-10-CM

## 2023-09-14 PROCEDURE — 99214 OFFICE O/P EST MOD 30 MIN: CPT | Mod: GT | Performed by: PSYCHIATRY & NEUROLOGY

## 2023-09-14 RX ORDER — FLUOXETINE HYDROCHLORIDE 20 MG/1
20 CAPSULE ORAL EVERY MORNING
Qty: 90 CAPSULE | Refills: 0 | Status: SHIPPED | OUTPATIENT
Start: 2023-09-14 | End: 2023-12-12 | Stop reason: SDUPTHER

## 2023-09-14 RX ORDER — FLUOXETINE HYDROCHLORIDE 40 MG/1
40 CAPSULE ORAL DAILY
Qty: 90 CAPSULE | Refills: 0 | Status: SHIPPED | OUTPATIENT
Start: 2023-09-14 | End: 2023-12-12 | Stop reason: SDUPTHER

## 2023-09-14 RX ORDER — PRAZOSIN HYDROCHLORIDE 2 MG/1
2 CAPSULE ORAL 2 TIMES DAILY
Qty: 180 CAPSULE | Refills: 0 | Status: SHIPPED | OUTPATIENT
Start: 2023-09-14

## 2023-09-14 RX ORDER — LAMOTRIGINE 100 MG/1
100 TABLET ORAL EVERY MORNING
Qty: 90 TABLET | Refills: 0 | Status: SHIPPED | OUTPATIENT
Start: 2023-09-14 | End: 2023-12-12 | Stop reason: SDUPTHER

## 2023-09-14 RX ORDER — LORAZEPAM 1 MG/1
.5-1 TABLET ORAL
Qty: 30 TABLET | Refills: 0 | Status: SHIPPED | OUTPATIENT
Start: 2023-09-14 | End: 2023-10-14

## 2023-09-14 RX ORDER — ARIPIPRAZOLE 5 MG/1
5 TABLET ORAL EVERY MORNING
Qty: 90 TABLET | Refills: 0 | Status: SHIPPED | OUTPATIENT
Start: 2023-09-14 | End: 2023-12-12

## 2023-09-14 SDOH — SOCIAL STABILITY - SOCIAL INSECURITY: DISSAPEARANCE AND DEATH OF FAMILY MEMBER: Z63.4

## 2023-09-14 NOTE — PROGRESS NOTES
PSYCHIATRY VIRTUAL VISIT FOLLOW-UP NOTE    Chief Complaint   Patient presents with    Follow-Up     Depression, anxiety     This evaluation was conducted via Zoom using secure and encrypted videoconferencing technology.   The patient was in a private location outside of their home in the [facility].    The patient's identity was confirmed and verbal consent was obtained for this virtual visit.    History Of Present Illness:  Augusta Allred is a 47 y.o. female with major depressive disorder, generalized anxiety disorder, PTSD, fibromyalgia, SLE, Sjogren's syndrome comes in today for follow up, was last seen over 3 months ago.  She has had ups and downs in regards to her mental health.  She had her son's first death anniversary in July and the same day one of her coworkers committed suicide.  However, she survived and is doing better now.  However, since then she has been struggling more with nightmares and anxiety related to death.  She did notice some benefit from a higher dose of Prozac but continues to have struggles with depression.  She continues to keep herself busy with work so that she does not have to work on her emotions.  She is traveling to [place] in the next few weeks and is endorsing anxiety related to it.  She tends to have anxiety in social situations and this traveling is happening with a big group of people.  She continues to have infrequent passive thoughts of death but denies active thoughts, intent or plan of wanting to hurt herself.    Social History:   She is ,  x 2,  x 1, 26 yo son from first marriage  in 2022,  for [employer] [place], lives with  in [place].    Substance Use:  Alcohol - Drinks in social settings only, once every few months  Nicotine - Smokes 4-5 cigarettes daily, is trying to cut down  Cannabis - Edible cannabis few times month, helps with pain, has been using it more lately   Illicit drugs - Denies     Past Medication  "Trials:  Zoloft (s/e - \"amped me too much\"), Celexa (ineffective), Lexapro (ineffective), Effexor, Wellbutrin (s/e - suicidal ideations), Remeron (ineffective), Nefazodone (ineffective), Lithium, Depakote, Tegretol, Hydroxyzine, Xanax    Medications:  Current Outpatient Medications   Medication Sig Dispense Refill    prazosin (MINIPRESS) 2 MG Cap Take 1 Capsule by mouth 2 times a day. 180 Capsule 0    FLUoxetine (PROZAC) 20 MG Cap Take 1 Capsule by mouth every morning. Take in combination with Prozac 40 mg for total daily dose of 60 mg. 90 Capsule 0    fluoxetine (PROZAC) 40 MG capsule Take 1 Capsule by mouth every day. Take in combination with Prozac 20 mg for total daily dose of 60 mg. 90 Capsule 0    ARIPiprazole (ABILIFY) 5 MG tablet Take 1 Tablet by mouth every morning. 90 Tablet 0    lamoTRIgine (LAMICTAL) 100 MG Tab Take 1 Tablet by mouth every morning. 90 Tablet 0    LORazepam (ATIVAN) 1 MG Tab Take 0.5-1 Tablets by mouth 1 time a day as needed for Anxiety for up to 30 days. 30 Tablet 0    Belimumab (BENLYSTA) 200 MG/ML Solution Auto-injector INJECT 1 PEN UNDER THE SKIN EVERY 7 DAYS. 3.92 mL 11    pregabalin (LYRICA) 75 MG Cap Take 1 Capsule by mouth 2 times a day for 180 days. 180 Capsule 1    Cholecalciferol (VITAMIN D3 PO)       predniSONE (DELTASONE) 5 MG Tab 20mg (4 tabs) daily for 1 week, then 15mg (3 tabs) daily for 1 week, then 10mg (2 tabs) daily for 1 week, then 5mg (1 tab) daily until follow up appt 130 Tablet 1    methotrexate 2.5 MG Tab Take 6 Tablets by mouth every 7 days. 90 Tablet 1    traZODone (DESYREL) 100 MG Tab Take 1 Tablet by mouth at bedtime. 90 Tablet 0    cyclobenzaprine (FLEXERIL) 10 mg Tab Take 1 Tablet by mouth 1 time a day as needed for Muscle Spasms. 30 Tablet 6    naproxen (NAPROSYN) 500 MG Tab Take 1 Tablet by mouth 2 times daily with meals as needed (pain). 60 Tablet 5    hydroxychloroquine (PLAQUENIL) 200 MG Tab Take 2 Tablets by mouth every day. 180 Tablet 3    NURTEC 75 " "MG TABLET DISPERSIBLE Take 1 Tablet by mouth 1 time a day as needed.      folic acid (FOLVITE) 1 MG Tab Take 1 Tablet by mouth every day. 90 Tablet 3    Lifitegrast (XIIDRA) 5 % Solution Administer  into affected eye(s) 2 times a day.      fluticasone (FLONASE) 50 MCG/ACT nasal spray Administer 1 Spray into affected nostril(S) every day.      aspirin EC (ECOTRIN) 81 MG Tablet Delayed Response Take 1 Tablet by mouth every day.       No current facility-administered medications for this visit.     Review Of Systems:    Constitutional - Positive for fatigue  Psychiatric - Positive for depression, anxiety    Physical Examination:  Vital signs: There were no vitals taken for this visit.    Musculoskeletal:No abnormal movements.     Mental Status Evaluation:   General: Middle aged female, dressed in casual attire, good grooming and hygiene, in no apparent distress, calm and cooperative, good eye contact, psychomotor retardation  Orientation: Alert and oriented to person, place and time  Recent and remote memory: Grossly intact  Attention span and concentration: Grossly intact  Speech: Spontaneous, normal rate, rhythm and tone  Thought Process: Linear, logical and goal directed  Thought Content: Denies active suicidal or homicidal ideations, intent or plan  Perception: No delusions noted  Associations: Intact  Language: Appropriate  Fund of knowledge and vocabulary: Grossly adequate  Mood: \"alright\"  Affect: Dysphoric, mood congruent  Insight: Good  Judgment: Good    Depression screening:      10/5/2021     9:00 AM 2022    10:40 AM 3/10/2023    11:40 AM   Depression Screen (PHQ-2/PHQ-9)   PHQ-2 Total Score 2 2 6   PHQ-9 Total Score 8 7 16     Interpretation of PHQ-9 Total Score   Score Severity   1-4 No Depression   5-9 Mild Depression   10-14 Moderate Depression   15-19 Moderately Severe Depression   20-27 Severe Depression    Anxiety screenin/29/2020     3:36 PM 2020     1:35 PM   SAMUEL 7   Total Score " 18    SAMUEL-7 Total Score  16     Interpretation of SAMUEL 7 Total Score   Score Severity:  0-4 No Anxiety   5-9 Mild Anxiety  10-14 Moderate Anxiety  15-21 Severe Anxiety    Medical Records/Labs/Diagnostic Tests Reviewed:  [place] PDMP records - appropriate refills, no abuse suspected   Labs from 2023 - Lipid profile with elevated triglycerides, A1c normal      Impression:  1.  Major depressive disorder, recurrent, mild - stable  2.  Generalized anxiety disorder - stable  3.  Post traumatic stress disorder (childhood and adult trauma, found son unresponsive and did CPR) - worsening  4.  Bereavement (25-year-old son  in 2022) - stable  5.  Chronic insomnia - stable    Plan:  1.  Increase Prozac to 60 mg in the morning for depression and anxiety  2.  Increase Prazosin to 2 mg twice daily for PTSD per patient request  3.  Continue Lamictal 100 mg in the morning for mood stabilization  4.  Continue Abilify 5 mg in the morning for depression augmentation  -Metabolic monitoring (2023): Lipid profile with elevated triglycerides, A1c normal  -Yearly metabolic monitoring labs due in 2024  5.  Continue Ativan 0.5 to 1 mg daily as needed for anxiety.  E-prescribed x 30 days with no refills  6.  Continue Trazodone 100 mg at bedtime for sleep    Return to clinic in 3 months per patient request or sooner if symptoms worsen    The proposed treatment plan was discussed with the patient who was provided the opportunity to ask questions and make suggestions regarding alternative treatment. Patient verbalized understanding and expressed agreement with the plan.    Genevieve Calderón M.D.  23    This note was created using voice recognition software (Dragon). The accuracy of the dictation is limited by the abilities of the software. I have reviewed the note prior to signing, however some errors in grammar and context are still possible. If you have any questions related to this note please do not hesitate to contact our  office.

## 2023-12-12 ENCOUNTER — TELEMEDICINE (OUTPATIENT)
Dept: BEHAVIORAL HEALTH | Facility: CLINIC | Age: 48
End: 2023-12-12
Payer: COMMERCIAL

## 2023-12-12 DIAGNOSIS — F33.0 MDD (MAJOR DEPRESSIVE DISORDER), RECURRENT EPISODE, MILD (HCC): ICD-10-CM

## 2023-12-12 DIAGNOSIS — F51.04 CHRONIC INSOMNIA: ICD-10-CM

## 2023-12-12 DIAGNOSIS — Z63.4 BEREAVEMENT: ICD-10-CM

## 2023-12-12 DIAGNOSIS — F43.10 POSTTRAUMATIC STRESS DISORDER: ICD-10-CM

## 2023-12-12 DIAGNOSIS — F41.1 GAD (GENERALIZED ANXIETY DISORDER): ICD-10-CM

## 2023-12-12 PROCEDURE — 99214 OFFICE O/P EST MOD 30 MIN: CPT | Mod: GT | Performed by: PSYCHIATRY & NEUROLOGY

## 2023-12-12 RX ORDER — TRAZODONE HYDROCHLORIDE 100 MG/1
100 TABLET ORAL
Qty: 90 TABLET | Refills: 1 | Status: SHIPPED | OUTPATIENT
Start: 2023-12-12

## 2023-12-12 RX ORDER — FLUOXETINE HYDROCHLORIDE 20 MG/1
20 CAPSULE ORAL EVERY MORNING
Qty: 90 CAPSULE | Refills: 1 | Status: SHIPPED | OUTPATIENT
Start: 2023-12-12

## 2023-12-12 RX ORDER — FLUOXETINE HYDROCHLORIDE 40 MG/1
40 CAPSULE ORAL EVERY MORNING
Qty: 90 CAPSULE | Refills: 1 | Status: SHIPPED | OUTPATIENT
Start: 2023-12-12

## 2023-12-12 RX ORDER — LAMOTRIGINE 100 MG/1
100 TABLET ORAL EVERY MORNING
Qty: 90 TABLET | Refills: 1 | Status: SHIPPED | OUTPATIENT
Start: 2023-12-12

## 2023-12-12 RX ORDER — ARIPIPRAZOLE 2 MG/1
2 TABLET ORAL EVERY MORNING
Qty: 90 TABLET | Refills: 1 | Status: SHIPPED | OUTPATIENT
Start: 2023-12-12

## 2023-12-12 RX ORDER — LORAZEPAM 1 MG/1
1 TABLET ORAL
Qty: 30 TABLET | Refills: 0 | Status: SHIPPED | OUTPATIENT
Start: 2023-12-12 | End: 2024-01-11

## 2023-12-12 SDOH — SOCIAL STABILITY - SOCIAL INSECURITY: DISSAPEARANCE AND DEATH OF FAMILY MEMBER: Z63.4

## 2023-12-12 NOTE — PROGRESS NOTES
"PSYCHIATRY VIRTUAL VISIT FOLLOW-UP NOTE    Chief Complaint   Patient presents with    Follow-Up     Depression, anxiety     This evaluation was conducted via Zoom using secure and encrypted videoconferencing technology.   The patient was in a private location outside of their [facility].    The patient's identity was confirmed and verbal consent was obtained for this virtual visit.    History Of Present Illness:  Augusta Allred is a 47 y.o. female with major depressive disorder, generalized anxiety disorder, PTSD, fibromyalgia, SLE, Sjogren's syndrome comes in today for follow up, was last seen 3 months ago.  She has noticed improvements in depression with the higher dose of Prozac.  She has been doing better in regards to flashbacks and nightmares with higher dose of Prazosin as well.  She is currently in the middle of an SLE flareup and fatigue and insomnia have been bothersome.  She has been on Abilify for years and has noticed improvements in depression with the but has also noticed difficulty with losing weight especially since she is on steroids for rheumatological illness.  She has been doing all right in regards to sleep.  She continues to manage her grief by working as much as possible.  She denies any stress in her marriage.  She continues to have infrequent passive thoughts of death but denies active thoughts, intent or plan of wanting to hurt herself.    Social History:   She is ,  x 2,  x 1, 24 yo son from first marriage  in 2022,  for [employer], lives with  in [place].    Substance Use:  Alcohol - Drinks in social settings only, once every few months  Nicotine - Vapes nicotine   Cannabis - Edible few times a week, helps with pain  Illicit drugs - Denies     Past Medication Trials:  Zoloft (s/e - \"amped me too much\"), Celexa (ineffective), Lexapro (ineffective), Effexor, Wellbutrin (s/e - suicidal ideations), Remeron " (ineffective), Nefazodone (ineffective), Lithium, Depakote, Tegretol, Hydroxyzine, Xanax    Medications:  Current Outpatient Medications   Medication Sig Dispense Refill    ARIPiprazole (ABILIFY) 2 MG tablet Take 1 Tablet by mouth every morning. 90 Tablet 1    FLUoxetine (PROZAC) 20 MG Cap Take 1 Capsule by mouth every morning. Take in combination with Prozac 40 mg for total daily dose of 60 mg. 90 Capsule 1    fluoxetine (PROZAC) 40 MG capsule Take 1 Capsule by mouth every morning. Take in combination with Prozac 20 mg for total daily dose of 60 mg. 90 Capsule 1    lamoTRIgine (LAMICTAL) 100 MG Tab Take 1 Tablet by mouth every morning. 90 Tablet 1    traZODone (DESYREL) 100 MG Tab Take 1 Tablet by mouth at bedtime. 90 Tablet 1    LORazepam (ATIVAN) 1 MG Tab Take 1 Tablet by mouth 1 time a day as needed for Anxiety for up to 30 days. 30 Tablet 0    predniSONE (DELTASONE) 5 MG Tab 20mg (4 tabs) daily for 1 week, then 15mg (3 tabs) daily for 1 week, then 10mg (2 tabs) daily for 1 week, then 5mg (1 tab) daily until follow up appt 130 Tablet 1    methotrexate 2.5 MG Tab Take 6 Tablets by mouth every 7 days. 90 Tablet 1    folic acid (FOLVITE) 1 MG Tab Take 1 Tablet by mouth every day. 90 Tablet 3    prazosin (MINIPRESS) 2 MG Cap Take 1 Capsule by mouth 2 times a day. 180 Capsule 0    Belimumab (BENLYSTA) 200 MG/ML Solution Auto-injector INJECT 1 PEN UNDER THE SKIN EVERY 7 DAYS. 3.92 mL 11    pregabalin (LYRICA) 75 MG Cap Take 1 Capsule by mouth 2 times a day for 180 days. 180 Capsule 1    Cholecalciferol (VITAMIN D3 PO)       cyclobenzaprine (FLEXERIL) 10 mg Tab Take 1 Tablet by mouth 1 time a day as needed for Muscle Spasms. 30 Tablet 6    naproxen (NAPROSYN) 500 MG Tab Take 1 Tablet by mouth 2 times daily with meals as needed (pain). 60 Tablet 5    hydroxychloroquine (PLAQUENIL) 200 MG Tab Take 2 Tablets by mouth every day. 180 Tablet 3    NURTEC 75 MG TABLET DISPERSIBLE Take 1 Tablet by mouth 1 time a day as needed.   "    Lifitegrast (XIIDRA) 5 % Solution Administer  into affected eye(s) 2 times a day.      fluticasone (FLONASE) 50 MCG/ACT nasal spray Administer 1 Spray into affected nostril(S) every day.      aspirin EC (ECOTRIN) 81 MG Tablet Delayed Response Take 1 Tablet by mouth every day.       No current facility-administered medications for this visit.     Review Of Systems:    Constitutional - Positive for fatigue  Psychiatric - Positive for depression, anxiety    Physical Examination:  Vital signs: There were no vitals taken for this visit.    Musculoskeletal: No abnormal movements.     Mental Status Evaluation:   General: Middle aged female, dressed in casual attire, good grooming and hygiene, in no apparent distress, calm and cooperative, good eye contact, psychomotor retardation  Orientation: Alert and oriented to person, place and time  Recent and remote memory: Grossly intact  Attention span and concentration: Grossly intact  Speech: Spontaneous, normal rate, rhythm and tone  Thought Process: Linear, logical and goal directed  Thought Content: Denies active suicidal or homicidal ideations, intent or plan  Perception: No delusions noted  Associations: Intact  Language: Appropriate  Fund of knowledge and vocabulary: Grossly adequate  Mood: \"alright\"  Affect: Dysphoric, mood congruent  Insight: Good  Judgment: Good    Depression screening:      10/5/2021     9:00 AM 2022    10:40 AM 3/10/2023    11:40 AM   Depression Screen (PHQ-2/PHQ-9)   PHQ-2 Total Score 2 2 6   PHQ-9 Total Score 8 7 16     Interpretation of PHQ-9 Total Score   Score Severity   1-4 No Depression   5-9 Mild Depression   10-14 Moderate Depression   15-19 Moderately Severe Depression   20-27 Severe Depression    Anxiety screenin/29/2020     3:36 PM 2020     1:35 PM   SAMUEL 7   Total Score 18    SAMUEL-7 Total Score  16     Interpretation of SAMUEL 7 Total Score   Score Severity:  0-4 No Anxiety   5-9 Mild Anxiety  10-14 Moderate " Anxiety  15-21 Severe Anxiety    Medical Records/Labs/Diagnostic Tests Reviewed:  [place] PDMP records - appropriate refills, no abuse suspected       Impression:  1.  Major depressive disorder, recurrent, mild - improving   2.  Generalized anxiety disorder - stable  3.  Post traumatic stress disorder (childhood and adult trauma, found son unresponsive and did CPR) - improving   4.  Bereavement (25-year-old son  in 2022) - stable  5.  Chronic insomnia - stable    Plan:  1.  Continue Prozac 60 mg in the morning for depression and anxiety  2.  Continue Prazosin 2 mg twice daily for PTSD   3.  Continue Lamictal 100 mg in the morning for mood stabilization  4.  Decrease Abilify to 2 mg in the morning for depression augmentation given improving depression and concerns for weight gain  -Metabolic monitoring (2023): Lipid profile with elevated triglycerides, A1c normal  -Yearly metabolic monitoring labs due in 2024  5.  Continue Ativan 0.5 to 1 mg daily as needed for anxiety.  E-prescribed x 30 days with no refills  6.  Continue Trazodone 100 mg at bedtime for sleep    Return to clinic in 6 months per patient request or sooner if symptoms worsen    The proposed treatment plan was discussed with the patient who was provided the opportunity to ask questions and make suggestions regarding alternative treatment. Patient verbalized understanding and expressed agreement with the plan.    Genevieve Calderón M.D.  23    This note was created using voice recognition software (Dragon). The accuracy of the dictation is limited by the abilities of the software. I have reviewed the note prior to signing, however some errors in grammar and context are still possible. If you have any questions related to this note please do not hesitate to contact our office.

## 2024-04-26 ENCOUNTER — HOSPITAL ENCOUNTER (OUTPATIENT)
Facility: MEDICAL CENTER | Age: 49
End: 2024-04-26
Attending: STUDENT IN AN ORGANIZED HEALTH CARE EDUCATION/TRAINING PROGRAM
Payer: COMMERCIAL

## 2024-04-26 ENCOUNTER — OFFICE VISIT (OUTPATIENT)
Dept: MEDICAL GROUP | Facility: PHYSICIAN GROUP | Age: 49
End: 2024-04-26
Payer: COMMERCIAL

## 2024-04-26 VITALS
DIASTOLIC BLOOD PRESSURE: 80 MMHG | BODY MASS INDEX: 37.75 KG/M2 | SYSTOLIC BLOOD PRESSURE: 116 MMHG | RESPIRATION RATE: 16 BRPM | TEMPERATURE: 97.2 F | OXYGEN SATURATION: 94 % | WEIGHT: 221.12 LBS | HEART RATE: 98 BPM | HEIGHT: 64 IN

## 2024-04-26 DIAGNOSIS — E55.9 VITAMIN D DEFICIENCY: ICD-10-CM

## 2024-04-26 DIAGNOSIS — M32.9 SLE (SYSTEMIC LUPUS ERYTHEMATOSUS RELATED SYNDROME) (HCC): ICD-10-CM

## 2024-04-26 DIAGNOSIS — Z13.220 LIPID SCREENING: ICD-10-CM

## 2024-04-26 DIAGNOSIS — G43.019 INTRACTABLE MIGRAINE WITHOUT AURA AND WITHOUT STATUS MIGRAINOSUS: ICD-10-CM

## 2024-04-26 DIAGNOSIS — M79.7 FIBROMYALGIA: ICD-10-CM

## 2024-04-26 DIAGNOSIS — Z12.11 SCREENING FOR COLORECTAL CANCER: ICD-10-CM

## 2024-04-26 DIAGNOSIS — Z12.12 SCREENING FOR COLORECTAL CANCER: ICD-10-CM

## 2024-04-26 PROBLEM — N93.9 ABNORMAL UTERINE BLEEDING: Status: RESOLVED | Noted: 2022-04-15 | Resolved: 2024-04-26

## 2024-04-26 PROCEDURE — 3079F DIAST BP 80-89 MM HG: CPT | Performed by: STUDENT IN AN ORGANIZED HEALTH CARE EDUCATION/TRAINING PROGRAM

## 2024-04-26 PROCEDURE — 3074F SYST BP LT 130 MM HG: CPT | Performed by: STUDENT IN AN ORGANIZED HEALTH CARE EDUCATION/TRAINING PROGRAM

## 2024-04-26 PROCEDURE — 99214 OFFICE O/P EST MOD 30 MIN: CPT | Performed by: STUDENT IN AN ORGANIZED HEALTH CARE EDUCATION/TRAINING PROGRAM

## 2024-04-26 PROCEDURE — 80307 DRUG TEST PRSMV CHEM ANLYZR: CPT

## 2024-04-26 RX ORDER — PREGABALIN 75 MG/1
75 CAPSULE ORAL 2 TIMES DAILY
Qty: 60 CAPSULE | Refills: 2 | Status: SHIPPED | OUTPATIENT
Start: 2024-04-26 | End: 2024-07-25

## 2024-04-26 RX ORDER — RIMEGEPANT SULFATE 75 MG/75MG
75 TABLET, ORALLY DISINTEGRATING ORAL
Qty: 60 TABLET | Refills: 1 | Status: SHIPPED | OUTPATIENT
Start: 2024-04-26

## 2024-04-26 RX ORDER — CYCLOBENZAPRINE HCL 10 MG
10 TABLET ORAL
Qty: 30 TABLET | Refills: 6 | Status: SHIPPED | OUTPATIENT
Start: 2024-04-26

## 2024-04-26 ASSESSMENT — PATIENT HEALTH QUESTIONNAIRE - PHQ9
9. THOUGHTS THAT YOU WOULD BE BETTER OFF DEAD, OR OF HURTING YOURSELF: NOT AT ALL
2. FEELING DOWN, DEPRESSED, IRRITABLE, OR HOPELESS: NOT AT ALL
5. POOR APPETITE OR OVEREATING: NOT AT ALL
7. TROUBLE CONCENTRATING ON THINGS, SUCH AS READING THE NEWSPAPER OR WATCHING TELEVISION: NOT AT ALL
SUM OF ALL RESPONSES TO PHQ9 QUESTIONS 1 AND 2: 0
3. TROUBLE FALLING OR STAYING ASLEEP OR SLEEPING TOO MUCH: NOT AT ALL
4. FEELING TIRED OR HAVING LITTLE ENERGY: NOT AT ALL
1. LITTLE INTEREST OR PLEASURE IN DOING THINGS: NOT AT ALL
SUM OF ALL RESPONSES TO PHQ QUESTIONS 1-9: 0
6. FEELING BAD ABOUT YOURSELF - OR THAT YOU ARE A FAILURE OR HAVE LET YOURSELF OR YOUR FAMILY DOWN: NOT AL ALL
8. MOVING OR SPEAKING SO SLOWLY THAT OTHER PEOPLE COULD HAVE NOTICED. OR THE OPPOSITE, BEING SO FIGETY OR RESTLESS THAT YOU HAVE BEEN MOVING AROUND A LOT MORE THAN USUAL: NOT AT ALL

## 2024-04-26 ASSESSMENT — FIBROSIS 4 INDEX: FIB4 SCORE: 1.21

## 2024-04-26 NOTE — PROGRESS NOTES
Verbal Consent given for CHANNING recording software    HISTORY OF PRESENT ILLNESS: Augusta is a pleasant 48 y.o. female, established patient who presents today to discuss medical problems as listed below:    History of Present Illness  The patient is a 48-year-old female here to establish care with me.    PTSD   INSOMNIA  The patient's health has progressively worsened following the death of her son, which has compounded her pre-traumatic stress disorder (PTSD). She continues to experience chronic insomnia, managed with trazodone 100 mg at bedtime and prazosin 2 mg twice daily for nightmares. She is under the care of a psychiatrist for her depression and is prescribed Prozac 60 mg, Abilify 2 mg, and Lamictal 100 mg each morning. Lorazepam is prescribed by her psychiatrist for anxiety management.    Migaraines  The patient continues to experience chronic headaches. She was previously under the care of a neurologist at [facility], but due to time constraints, she has been unable to attend. She ran out of [facility] last summer and requires a refill, despite experiencing severe migraines. She denies any aura associated with her migraines. She has previously received Botox for her headaches.    #autoimmune   Lupus  sjorgens  The patient has been diagnosed with Sjogren's syndrome and lupus. She is under the care of a rheumatologist, Dr. Aruna Quiles, and has an upcoming appointment at [facility]. She has run out of [facility] and is currently on Plaquenil 400 mg daily, methotrexate 15 mg weekly, and folic acid. She has an antibody test that could potentially cause blood clots, but is unsure of the specifics. She has no history of blood clots and takes baby aspirin. She is not currently under the care of a hematologist. She denies any history of hypertension or diabetes. She is scheduled for labs next week and requests a vitamin D level check, which she checks every spring due to her depression. She denies experiencing chest pain, shortness  of breath, melena, hematochezia, or dysuria.    #fibromylagia  The patient requires refills for Lyrica and Flexeril 10 mg for fibromyalgia. She uses marijuana for pain management. Her psychiatrist prescribed lorazepam as needed for anxiety. She has consumed edibles and Ativan this week. She avoids mixing her medications due to fear of falling asleep.    Supplemental Information  She had abnormal uterine bleeding in the past, but that has resolved. She sees her regular doctor once every 6 months, ophthalmologist once every 6 months, and rheumatologist every 3 months.   The patient denies alcohol intake. She vapes nicotine. She is not motivated to quit at this moment. She used to smoke cigarettes for about 20 years, just half a pack a day.   She denies any family history of colon cancer.       Current Outpatient Medications Ordered in Epic   Medication Sig Dispense Refill    cyclobenzaprine (FLEXERIL) 10 mg Tab Take 1 Tablet by mouth 1 time a day as needed for Muscle Spasms. 30 Tablet 6    pregabalin (LYRICA) 75 MG Cap Take 1 Capsule by mouth 2 times a day for 90 days. 60 Capsule 2    NURTEC 75 MG TABLET DISPERSIBLE Take 1 Tablet by mouth 1 time a day as needed (migraine). 60 Tablet 1    ARIPiprazole (ABILIFY) 2 MG tablet Take 1 Tablet by mouth every morning. 90 Tablet 1    FLUoxetine (PROZAC) 20 MG Cap Take 1 Capsule by mouth every morning. Take in combination with Prozac 40 mg for total daily dose of 60 mg. 90 Capsule 1    fluoxetine (PROZAC) 40 MG capsule Take 1 Capsule by mouth every morning. Take in combination with Prozac 20 mg for total daily dose of 60 mg. 90 Capsule 1    lamoTRIgine (LAMICTAL) 100 MG Tab Take 1 Tablet by mouth every morning. 90 Tablet 1    traZODone (DESYREL) 100 MG Tab Take 1 Tablet by mouth at bedtime. 90 Tablet 1    methotrexate 2.5 MG Tab Take 6 Tablets by mouth every 7 days. 90 Tablet 1    folic acid (FOLVITE) 1 MG Tab Take 1 Tablet by mouth every day. 90 Tablet 3    prazosin (MINIPRESS)  2 MG Cap Take 1 Capsule by mouth 2 times a day. 180 Capsule 0    Cholecalciferol (VITAMIN D3 PO)       hydroxychloroquine (PLAQUENIL) 200 MG Tab Take 2 Tablets by mouth every day. 180 Tablet 3    aspirin EC (ECOTRIN) 81 MG Tablet Delayed Response Take 1 Tablet by mouth every day.      Belimumab (BENLYSTA) 200 MG/ML Solution Auto-injector INJECT 1 PEN UNDER THE SKIN EVERY 7 DAYS. 3.92 mL 11     No current [facility]-ordered facility-administered medications on file.       Review of systems:  Per HPI    Patient Active Problem List    Diagnosis Date Noted    MDD (major depressive disorder), recurrent episode, mild (HCC) 06/08/2023    Obesity (BMI 30-39.9) 03/10/2023    Bereavement 10/03/2022    Stress incontinence 04/15/2022    COVID-19 12/29/2021    Leah-menopausal 03/23/2021    Intractable migraine without aura and without status migrainosus 03/23/2021    Cigarette nicotine dependence without complication 03/23/2021    Sjogren's syndrome ([facility]) 12/05/2019    Neck pain 12/05/2019    SAMUEL (generalized anxiety disorder) 10/30/2018    SLE (systemic lupus erythematosus related syndrome) ([facility]) 10/20/2018    Fibromyalgia 07/06/2018    Seasonal allergies 07/06/2018    Chronic insomnia 07/06/2018    PTSD (post-traumatic stress disorder) 07/06/2018     Past Surgical History:   Procedure Laterality Date    DENTAL EXTRACTION(S)  1996    KNEE ARTHROSCOPY      PRIMARY C SECTION      TUBAL LIGATION       Social History     Tobacco Use    Smoking status: Former     Current packs/day: 0.50     Average packs/day: 0.5 packs/day for 21.0 years (10.5 ttl pk-yrs)     Types: Cigarettes    Smokeless tobacco: Never   Vaping Use    Vaping Use: Every day    Substances: Nicotine   Substance Use Topics    Alcohol use: Yes     Comment: rare    Drug use: Yes     Types: Marijuana, Oral     Comment: edible few times a week      Family History   Problem Relation Age of Onset    Cancer Mother         uterine    Other Mother         celiac    Depression Father      Lung Disease Father     Bladder cancer Father     Other Father         drug addict    Heart Disease Sister     Other Sister         mental retardation    Bipolar disorder Brother     Other Brother         addiction    Other Brother         paranoid schizophrenia, drug addiction    Prostate cancer Maternal Grandfather     Breast Cancer Maternal Grandmother     Other Maternal Grandmother         stroke    Heart Disease Paternal Grandfather     Heart Disease Paternal Grandmother      Current Outpatient Medications   Medication Sig Dispense Refill    cyclobenzaprine (FLEXERIL) 10 mg Tab Take 1 Tablet by mouth 1 time a day as needed for Muscle Spasms. 30 Tablet 6    pregabalin (LYRICA) 75 MG Cap Take 1 Capsule by mouth 2 times a day for 90 days. 60 Capsule 2    NURTEC 75 MG TABLET DISPERSIBLE Take 1 Tablet by mouth 1 time a day as needed (migraine). 60 Tablet 1    ARIPiprazole (ABILIFY) 2 MG tablet Take 1 Tablet by mouth every morning. 90 Tablet 1    FLUoxetine (PROZAC) 20 MG Cap Take 1 Capsule by mouth every morning. Take in combination with Prozac 40 mg for total daily dose of 60 mg. 90 Capsule 1    fluoxetine (PROZAC) 40 MG capsule Take 1 Capsule by mouth every morning. Take in combination with Prozac 20 mg for total daily dose of 60 mg. 90 Capsule 1    lamoTRIgine (LAMICTAL) 100 MG Tab Take 1 Tablet by mouth every morning. 90 Tablet 1    traZODone (DESYREL) 100 MG Tab Take 1 Tablet by mouth at bedtime. 90 Tablet 1    methotrexate 2.5 MG Tab Take 6 Tablets by mouth every 7 days. 90 Tablet 1    folic acid (FOLVITE) 1 MG Tab Take 1 Tablet by mouth every day. 90 Tablet 3    prazosin (MINIPRESS) 2 MG Cap Take 1 Capsule by mouth 2 times a day. 180 Capsule 0    Cholecalciferol (VITAMIN D3 PO)       hydroxychloroquine (PLAQUENIL) 200 MG Tab Take 2 Tablets by mouth every day. 180 Tablet 3    aspirin EC (ECOTRIN) 81 MG Tablet Delayed Response Take 1 Tablet by mouth every day.      Belimumab (BENLYSTA) 200 MG/ML Solution  "Auto-injector INJECT 1 PEN UNDER THE SKIN EVERY 7 DAYS. 3.92 mL 11     No current facility-administered medications for this visit.       Allergies:  Allergies   Allergen Reactions    Codeine        Allergies, past medical history, past surgical history, family history, social history reviewed and updated.    Objective:    /80   Pulse 98   Temp 36.2 °C (97.2 °F) (Temporal)   Resp 16   Ht 1.626 m (5' 4\")   Wt 100 kg (221 lb 1.9 oz)   SpO2 94%   BMI 37.96 kg/m²    Body mass index is 37.96 kg/m².    Physical exam:  General: Normal appearance, no acute distress, not ill-appearing  HEENT: EOM intact, conjunctiva normal limits, negative right/left eye discharge.  Sclera anicteric  Cardiovascular: Normal rate and rhythm, no murmurs  Pulmonary: No respiratory distress, no wheezing, no rales, breath sounds normal.  Musculoskeletal: No edema bilaterally  Skin: Warm, dry, no lesions  Neurological: No focal deficits, normal gait  Psychiatric: Mood within normal limits    Assessment/Plan:    Assessment & Plan  1. Chronic insomnia.  Continue trazodone, Minipress for nightmares secondary to PTSD  The patient is advised to cease vaping. Should she require assistance with nicotine addiction assistance, she is to inform me.    2. Chronic headaches.  Chronic issue new to me, uncontrolled.  She was once seeing neurology who prescribed Nurtec as needed as well as doing Botox injections.  She has not been seen in over a year and has been without her Nurtec for a year but she desires this as she does have migraines and would like to use the as needed.  A prescription for Nurtec will be provided.    3. Depression.  The patient is under the care of a psychiatrist.    4. Sjogren's syndrome and lupus.  The patient is scheduled to follow up with her rheumatologist.    5. Fibromyalgia.  Patient on Lyrica as well as Flexeril.  Will refill.  Urine drug screen today, controlled substance agreement today PDMP reviewed, consistent.  She " is on lorazepam managed by psychiatry for anxiety.  She also does marijuana for chronic pain.    6. Colon cancer screening.  A colonoscopy was recommended.    Follow-up  The patient is scheduled for a follow-up visit in 6 months, or earlier if necessary.       Problem List Items Addressed This Visit       Fibromyalgia    Relevant Medications    cyclobenzaprine (FLEXERIL) 10 mg Tab    pregabalin (LYRICA) 75 MG Cap    Other Relevant Orders    Controlled Substance Treatment Agreement    URINE DRUG SCREEN    SLE (systemic lupus erythematosus related syndrome) (HCC)    Relevant Orders    FOLATE    Intractable migraine without aura and without status migrainosus    Relevant Medications    cyclobenzaprine (FLEXERIL) 10 mg Tab    pregabalin (LYRICA) 75 MG Cap    NURTEC 75 MG TABLET DISPERSIBLE     Other Visit Diagnoses       Screening for colorectal cancer        Relevant Orders    Referral to GI for Colonoscopy    Vitamin D deficiency        Relevant Orders    VITAMIN D,25 HYDROXY (DEFICIENCY)    Lipid screening        Relevant Orders    Lipid Profile            Return in about 3 months (around 7/26/2024).

## 2024-04-28 LAB
AMPHET CTO UR CFM-MCNC: NEGATIVE NG/ML
BARBITURATES CTO UR CFM-MCNC: NEGATIVE NG/ML
BENZODIAZ CTO UR CFM-MCNC: NEGATIVE NG/ML
CANNABINOIDS CTO UR CFM-MCNC: NORMAL NG/ML
COCAINE CTO UR CFM-MCNC: NEGATIVE NG/ML
CREAT UR-MCNC: 109 MG/DL (ref 20–400)
DRUG COMMENT 753798: NORMAL
METHADONE CTO UR CFM-MCNC: NEGATIVE NG/ML
OPIATES CTO UR CFM-MCNC: NEGATIVE NG/ML
PCP CTO UR CFM-MCNC: NEGATIVE NG/ML
PROPOXYPH CTO UR CFM-MCNC: NEGATIVE NG/ML

## 2024-04-30 LAB — THC UR CFM-MCNC: >500 NG/ML

## 2024-05-14 ENCOUNTER — HOSPITAL ENCOUNTER (OUTPATIENT)
Dept: LAB | Facility: MEDICAL CENTER | Age: 49
End: 2024-05-14
Attending: INTERNAL MEDICINE
Payer: COMMERCIAL

## 2024-05-14 ENCOUNTER — HOSPITAL ENCOUNTER (OUTPATIENT)
Dept: LAB | Facility: MEDICAL CENTER | Age: 49
End: 2024-05-14
Attending: STUDENT IN AN ORGANIZED HEALTH CARE EDUCATION/TRAINING PROGRAM
Payer: COMMERCIAL

## 2024-05-14 DIAGNOSIS — M32.9 SLE (SYSTEMIC LUPUS ERYTHEMATOSUS RELATED SYNDROME) (HCC): ICD-10-CM

## 2024-05-14 DIAGNOSIS — M32.9 SYSTEMIC LUPUS ERYTHEMATOSUS, UNSPECIFIED SLE TYPE, UNSPECIFIED ORGAN INVOLVEMENT STATUS (HCC): ICD-10-CM

## 2024-05-14 DIAGNOSIS — E55.9 VITAMIN D DEFICIENCY: ICD-10-CM

## 2024-05-14 DIAGNOSIS — Z13.220 LIPID SCREENING: ICD-10-CM

## 2024-05-14 LAB
25(OH)D3 SERPL-MCNC: 37 NG/ML (ref 30–100)
ALBUMIN SERPL BCP-MCNC: 4.2 G/DL (ref 3.2–4.9)
ALBUMIN/GLOB SERPL: 1.3 G/DL
ALP SERPL-CCNC: 100 U/L (ref 30–99)
ALT SERPL-CCNC: 67 U/L (ref 2–50)
ANION GAP SERPL CALC-SCNC: 10 MMOL/L (ref 7–16)
APPEARANCE UR: CLEAR
AST SERPL-CCNC: 59 U/L (ref 12–45)
BASOPHILS # BLD AUTO: 0.5 % (ref 0–1.8)
BASOPHILS # BLD: 0.01 K/UL (ref 0–0.12)
BILIRUB SERPL-MCNC: 0.4 MG/DL (ref 0.1–1.5)
BILIRUB UR QL STRIP.AUTO: NEGATIVE
BUN SERPL-MCNC: 8 MG/DL (ref 8–22)
C3 SERPL-MCNC: 106.8 MG/DL (ref 87–200)
C4 SERPL-MCNC: 9.8 MG/DL (ref 19–52)
CALCIUM ALBUM COR SERPL-MCNC: 9 MG/DL (ref 8.5–10.5)
CALCIUM SERPL-MCNC: 9.2 MG/DL (ref 8.5–10.5)
CHLORIDE SERPL-SCNC: 106 MMOL/L (ref 96–112)
CHOLEST SERPL-MCNC: 135 MG/DL (ref 100–199)
CO2 SERPL-SCNC: 23 MMOL/L (ref 20–33)
COLOR UR: YELLOW
CREAT SERPL-MCNC: 0.75 MG/DL (ref 0.5–1.4)
CREAT UR-MCNC: 100.31 MG/DL
CRP SERPL HS-MCNC: 0.97 MG/DL (ref 0–0.75)
EOSINOPHIL # BLD AUTO: 0.05 K/UL (ref 0–0.51)
EOSINOPHIL NFR BLD: 2.7 % (ref 0–6.9)
ERYTHROCYTE [DISTWIDTH] IN BLOOD BY AUTOMATED COUNT: 46.7 FL (ref 35.9–50)
ERYTHROCYTE [SEDIMENTATION RATE] IN BLOOD BY WESTERGREN METHOD: 24 MM/HOUR (ref 0–25)
FASTING STATUS PATIENT QL REPORTED: NORMAL
FOLATE SERPL-MCNC: >40 NG/ML
GFR SERPLBLD CREATININE-BSD FMLA CKD-EPI: 98 ML/MIN/1.73 M 2
GLOBULIN SER CALC-MCNC: 3.2 G/DL (ref 1.9–3.5)
GLUCOSE SERPL-MCNC: 81 MG/DL (ref 65–99)
GLUCOSE UR STRIP.AUTO-MCNC: NEGATIVE MG/DL
HCT VFR BLD AUTO: 40.9 % (ref 37–47)
HDLC SERPL-MCNC: 37 MG/DL
HGB BLD-MCNC: 14.3 G/DL (ref 12–16)
IMM GRANULOCYTES # BLD AUTO: 0.01 K/UL (ref 0–0.11)
IMM GRANULOCYTES NFR BLD AUTO: 0.5 % (ref 0–0.9)
KETONES UR STRIP.AUTO-MCNC: NEGATIVE MG/DL
LDLC SERPL CALC-MCNC: 74 MG/DL
LEUKOCYTE ESTERASE UR QL STRIP.AUTO: NEGATIVE
LYMPHOCYTES # BLD AUTO: 0.71 K/UL (ref 1–4.8)
LYMPHOCYTES NFR BLD: 38.2 % (ref 22–41)
MCH RBC QN AUTO: 34.1 PG (ref 27–33)
MCHC RBC AUTO-ENTMCNC: 35 G/DL (ref 32.2–35.5)
MCV RBC AUTO: 97.6 FL (ref 81.4–97.8)
MICRO URNS: NORMAL
MONOCYTES # BLD AUTO: 0.2 K/UL (ref 0–0.85)
MONOCYTES NFR BLD AUTO: 10.8 % (ref 0–13.4)
NEUTROPHILS # BLD AUTO: 0.88 K/UL (ref 1.82–7.42)
NEUTROPHILS NFR BLD: 47.3 % (ref 44–72)
NITRITE UR QL STRIP.AUTO: NEGATIVE
NRBC # BLD AUTO: 0 K/UL
NRBC BLD-RTO: 0 /100 WBC (ref 0–0.2)
PH UR STRIP.AUTO: 7 [PH] (ref 5–8)
PLATELET # BLD AUTO: 178 K/UL (ref 164–446)
PMV BLD AUTO: 10.7 FL (ref 9–12.9)
POTASSIUM SERPL-SCNC: 4.4 MMOL/L (ref 3.6–5.5)
PROT SERPL-MCNC: 7.4 G/DL (ref 6–8.2)
PROT UR QL STRIP: NEGATIVE MG/DL
PROT UR-MCNC: 7 MG/DL (ref 0–15)
PROT/CREAT UR: 70 MG/G (ref 10–107)
RBC # BLD AUTO: 4.19 M/UL (ref 4.2–5.4)
RBC UR QL AUTO: NEGATIVE
SODIUM SERPL-SCNC: 139 MMOL/L (ref 135–145)
SP GR UR STRIP.AUTO: 1.01
TRIGL SERPL-MCNC: 120 MG/DL (ref 0–149)
UROBILINOGEN UR STRIP.AUTO-MCNC: 0.2 MG/DL
WBC # BLD AUTO: 1.9 K/UL (ref 4.8–10.8)

## 2024-05-16 ENCOUNTER — HOSPITAL ENCOUNTER (OUTPATIENT)
Dept: LAB | Facility: MEDICAL CENTER | Age: 49
End: 2024-05-16
Attending: NURSE PRACTITIONER
Payer: COMMERCIAL

## 2024-05-16 DIAGNOSIS — M35.00 SJOGREN'S SYNDROME, WITH UNSPECIFIED ORGAN INVOLVEMENT (HCC): ICD-10-CM

## 2024-05-16 DIAGNOSIS — D72.810 LYMPHOPENIA: ICD-10-CM

## 2024-05-16 DIAGNOSIS — M32.9 SYSTEMIC LUPUS ERYTHEMATOSUS, UNSPECIFIED SLE TYPE, UNSPECIFIED ORGAN INVOLVEMENT STATUS (HCC): ICD-10-CM

## 2024-05-16 DIAGNOSIS — M31.0 LEUCOCYTOCLASTIC VASCULITIS (HCC): ICD-10-CM

## 2024-05-16 LAB
BASOPHILS # BLD AUTO: 0.4 % (ref 0–1.8)
BASOPHILS # BLD: 0.01 K/UL (ref 0–0.12)
EOSINOPHIL # BLD AUTO: 0.06 K/UL (ref 0–0.51)
EOSINOPHIL NFR BLD: 2.2 % (ref 0–6.9)
ERYTHROCYTE [DISTWIDTH] IN BLOOD BY AUTOMATED COUNT: 49.2 FL (ref 35.9–50)
HCT VFR BLD AUTO: 42 % (ref 37–47)
HGB BLD-MCNC: 14.2 G/DL (ref 12–16)
IMM GRANULOCYTES # BLD AUTO: 0.01 K/UL (ref 0–0.11)
IMM GRANULOCYTES NFR BLD AUTO: 0.4 % (ref 0–0.9)
LYMPHOCYTES # BLD AUTO: 1.04 K/UL (ref 1–4.8)
LYMPHOCYTES NFR BLD: 38.4 % (ref 22–41)
MCH RBC QN AUTO: 33.6 PG (ref 27–33)
MCHC RBC AUTO-ENTMCNC: 33.8 G/DL (ref 32.2–35.5)
MCV RBC AUTO: 99.3 FL (ref 81.4–97.8)
MONOCYTES # BLD AUTO: 0.34 K/UL (ref 0–0.85)
MONOCYTES NFR BLD AUTO: 12.5 % (ref 0–13.4)
NEUTROPHILS # BLD AUTO: 1.25 K/UL (ref 1.82–7.42)
NEUTROPHILS NFR BLD: 46.1 % (ref 44–72)
NRBC # BLD AUTO: 0 K/UL
NRBC BLD-RTO: 0 /100 WBC (ref 0–0.2)
PLATELET # BLD AUTO: 165 K/UL (ref 164–446)
PMV BLD AUTO: 10.4 FL (ref 9–12.9)
RBC # BLD AUTO: 4.23 M/UL (ref 4.2–5.4)
WBC # BLD AUTO: 2.7 K/UL (ref 4.8–10.8)

## 2024-06-07 ENCOUNTER — TELEMEDICINE (OUTPATIENT)
Dept: BEHAVIORAL HEALTH | Facility: CLINIC | Age: 49
End: 2024-06-07
Payer: COMMERCIAL

## 2024-06-07 DIAGNOSIS — F43.10 POSTTRAUMATIC STRESS DISORDER: ICD-10-CM

## 2024-06-07 DIAGNOSIS — F51.04 CHRONIC INSOMNIA: ICD-10-CM

## 2024-06-07 DIAGNOSIS — F33.0 MDD (MAJOR DEPRESSIVE DISORDER), RECURRENT EPISODE, MILD (HCC): ICD-10-CM

## 2024-06-07 DIAGNOSIS — F41.1 GAD (GENERALIZED ANXIETY DISORDER): ICD-10-CM

## 2024-06-07 PROBLEM — Z63.4 BEREAVEMENT: Status: RESOLVED | Noted: 2022-10-03 | Resolved: 2024-06-07

## 2024-06-07 PROCEDURE — 99214 OFFICE O/P EST MOD 30 MIN: CPT | Mod: 95 | Performed by: PSYCHIATRY & NEUROLOGY

## 2024-06-07 RX ORDER — LORAZEPAM 1 MG/1
1 TABLET ORAL
Qty: 30 TABLET | Refills: 0 | Status: SHIPPED | OUTPATIENT
Start: 2024-06-07 | End: 2024-07-07

## 2024-06-07 RX ORDER — ARIPIPRAZOLE 2 MG/1
2 TABLET ORAL EVERY MORNING
Qty: 90 TABLET | Refills: 1 | Status: SHIPPED | OUTPATIENT
Start: 2024-06-07

## 2024-06-07 RX ORDER — FLUOXETINE HYDROCHLORIDE 40 MG/1
40 CAPSULE ORAL EVERY MORNING
Qty: 90 CAPSULE | Refills: 1 | Status: SHIPPED | OUTPATIENT
Start: 2024-06-07

## 2024-06-07 RX ORDER — FLUOXETINE HYDROCHLORIDE 20 MG/1
20 CAPSULE ORAL EVERY MORNING
Qty: 90 CAPSULE | Refills: 1 | Status: SHIPPED | OUTPATIENT
Start: 2024-06-07

## 2024-06-07 RX ORDER — PRAZOSIN HYDROCHLORIDE 2 MG/1
2 CAPSULE ORAL 2 TIMES DAILY
Qty: 180 CAPSULE | Refills: 1 | Status: SHIPPED | OUTPATIENT
Start: 2024-06-07

## 2024-06-07 RX ORDER — TRAZODONE HYDROCHLORIDE 100 MG/1
100 TABLET ORAL
Qty: 90 TABLET | Refills: 1 | Status: SHIPPED | OUTPATIENT
Start: 2024-06-07

## 2024-06-07 ASSESSMENT — PATIENT HEALTH QUESTIONNAIRE - PHQ9
1. LITTLE INTEREST OR PLEASURE IN DOING THINGS: 1
SUM OF ALL RESPONSES TO PHQ QUESTIONS 1-9: 13
5. POOR APPETITE OR OVEREATING: 1
3. TROUBLE FALLING OR STAYING ASLEEP OR SLEEPING TOO MUCH: 2
2. FEELING DOWN, DEPRESSED, IRRITABLE, OR HOPELESS: 2
10. IF YOU CHECKED OFF ANY PROBLEMS, HOW DIFFICULT HAVE THESE PROBLEMS MADE IT FOR YOU TO DO YOUR WORK, TAKE CARE OF THINGS AT HOME, OR GET ALONG WITH OTHER PEOPLE: SOMEWHAT DIFFICULT
3. TROUBLE FALLING OR STAYING ASLEEP OR SLEEPING TOO MUCH: MORE THAN HALF THE DAYS
6. FEELING BAD ABOUT YOURSELF - OR THAT YOU ARE A FAILURE OR HAVE LET YOURSELF OR YOUR FAMILY DOWN: 2
4. FEELING TIRED OR HAVING LITTLE ENERGY: 2
6. FEELING BAD ABOUT YOURSELF - OR THAT YOU ARE A FAILURE OR HAVE LET YOURSELF OR YOUR FAMILY DOWN: MORE THAN HALF THE DAYS
1. LITTLE INTEREST OR PLEASURE IN DOING THINGS: SEVERAL DAYS
SUM OF ALL RESPONSES TO PHQ QUESTIONS 1-9: 13
2. FEELING DOWN, DEPRESSED, IRRITABLE, OR HOPELESS: MORE THAN HALF THE DAYS
4. FEELING TIRED OR HAVING LITTLE ENERGY: MORE THAN HALF THE DAYS
8. MOVING OR SPEAKING SO SLOWLY THAT OTHER PEOPLE COULD HAVE NOTICED. OR THE OPPOSITE, BEING SO FIGETY OR RESTLESS THAT YOU HAVE BEEN MOVING AROUND A LOT MORE THAN USUAL: NOT AT ALL
8. MOVING OR SPEAKING SO SLOWLY THAT OTHER PEOPLE COULD HAVE NOTICED. OR THE OPPOSITE, BEING SO FIGETY OR RESTLESS THAT YOU HAVE BEEN MOVING AROUND A LOT MORE THAN USUAL: 0
9. THOUGHTS THAT YOU WOULD BE BETTER OFF DEAD, OR OF HURTING YOURSELF: 2
5. POOR APPETITE OR OVEREATING: SEVERAL DAYS
5. POOR APPETITE OR OVEREATING: 1 - SEVERAL DAYS
7. TROUBLE CONCENTRATING ON THINGS, SUCH AS READING THE NEWSPAPER OR WATCHING TELEVISION: 1
9. THOUGHTS THAT YOU WOULD BE BETTER OFF DEAD, OR OF HURTING YOURSELF: MORE THAN HALF THE DAYS
7. TROUBLE CONCENTRATING ON THINGS, SUCH AS READING THE NEWSPAPER OR WATCHING TELEVISION: SEVERAL DAYS
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
## 2024-08-13 ENCOUNTER — OFFICE VISIT (OUTPATIENT)
Dept: MEDICAL GROUP | Facility: PHYSICIAN GROUP | Age: 49
End: 2024-08-13

## 2024-08-13 VITALS
WEIGHT: 218.92 LBS | HEIGHT: 64 IN | HEART RATE: 79 BPM | RESPIRATION RATE: 16 BRPM | SYSTOLIC BLOOD PRESSURE: 118 MMHG | DIASTOLIC BLOOD PRESSURE: 76 MMHG | BODY MASS INDEX: 37.37 KG/M2 | TEMPERATURE: 97.7 F | OXYGEN SATURATION: 96 %

## 2024-08-13 DIAGNOSIS — M35.9 XEROSTOMIA DUE TO AUTOIMMUNE DISEASE (HCC): ICD-10-CM

## 2024-08-13 DIAGNOSIS — E66.9 OBESITY (BMI 30-39.9): ICD-10-CM

## 2024-08-13 DIAGNOSIS — M79.7 FIBROMYALGIA: ICD-10-CM

## 2024-08-13 DIAGNOSIS — K11.7 XEROSTOMIA DUE TO AUTOIMMUNE DISEASE (HCC): ICD-10-CM

## 2024-08-13 PROCEDURE — 3074F SYST BP LT 130 MM HG: CPT | Performed by: STUDENT IN AN ORGANIZED HEALTH CARE EDUCATION/TRAINING PROGRAM

## 2024-08-13 PROCEDURE — 99214 OFFICE O/P EST MOD 30 MIN: CPT | Performed by: STUDENT IN AN ORGANIZED HEALTH CARE EDUCATION/TRAINING PROGRAM

## 2024-08-13 PROCEDURE — 3078F DIAST BP <80 MM HG: CPT | Performed by: STUDENT IN AN ORGANIZED HEALTH CARE EDUCATION/TRAINING PROGRAM

## 2024-08-13 RX ORDER — CEVIMELINE HYDROCHLORIDE 30 MG/1
30 CAPSULE ORAL 3 TIMES DAILY
Qty: 90 CAPSULE | Refills: 3 | Status: SHIPPED | OUTPATIENT
Start: 2024-08-13

## 2024-08-13 RX ORDER — PREGABALIN 75 MG/1
75 CAPSULE ORAL 2 TIMES DAILY
Qty: 180 CAPSULE | Refills: 0 | Status: SHIPPED | OUTPATIENT
Start: 2024-08-13 | End: 2024-11-11

## 2024-08-13 RX ORDER — PREGABALIN 75 MG/1
75 CAPSULE ORAL 2 TIMES DAILY
Qty: 60 CAPSULE | Refills: 2 | Status: SHIPPED | OUTPATIENT
Start: 2024-08-13 | End: 2024-08-13

## 2024-08-13 RX ORDER — CYCLOBENZAPRINE HCL 10 MG
10 TABLET ORAL
Qty: 30 TABLET | Refills: 6 | Status: SHIPPED | OUTPATIENT
Start: 2024-08-13

## 2024-08-13 RX ORDER — TIRZEPATIDE 2.5 MG/.5ML
2.5 INJECTION, SOLUTION SUBCUTANEOUS
Qty: 0.5 ML | Refills: 0 | Status: SHIPPED | OUTPATIENT
Start: 2024-08-13 | End: 2024-09-12

## 2024-08-13 ASSESSMENT — FIBROSIS 4 INDEX: FIB4 SCORE: 2.05

## 2024-08-14 NOTE — PROGRESS NOTES
Verbal Consent given for [employer] recording software    HISTORY OF PRESENT ILLNESS: Augusta is a pleasant 48 y.o. female, established patient who presents today to discuss medical problems as listed below:    History of Present Illness  The patient is a 48-year-old female here for a follow-up.    She is seeking a refill of her Lyrica and Flexeril prescriptions, which she uses for fibromyalgia as needed.    She is seeking assistance with weight loss and is seeking medication for dry mouth. She consumes edibles to manage her pain, taking one capsule twice daily. Her Sjogren's syndrome is causing her dry mouth. She consumes 80 to 100 ounces of water daily, but this does not alleviate her dry mouth. She has tried sugar-free ginger candy and ginger tea for relief.     Her weight gain is significantly limiting her activities. She was on steroids for over a year, which she believes contributed to her weight gain.    Supplemental Information  She takes Benadryl periodically. She takes Flonase and Zyrtec daily. She has tachycardia.    SOCIAL HISTORY  She does not drink alcohol or do recreational drugs.    FAMILY HISTORY  She denies any family history of any kind of endocrine cancers.       Current Outpatient Medications Ordered in Epic   Medication Sig Dispense Refill    cyclobenzaprine (FLEXERIL) 10 mg Tab Take 1 Tablet by mouth 1 time a day as needed for Muscle Spasms. 30 Tablet 6    Tirzepatide (MOUNJARO) 2.5 MG/0.5ML Solution Pen-injector Inject 2.5 mg under the skin every 7 days for 30 days. 0.5 mL 0    pregabalin (LYRICA) 75 MG Cap Take 1 Capsule by mouth 2 times a day for 90 days. 180 Capsule 0    cevimeline (EVOXAC) 30 MG capsule Take 1 Capsule by mouth in the morning, at noon, and at bedtime. 90 Capsule 3    ARIPiprazole (ABILIFY) 2 MG tablet Take 1 Tablet by mouth every morning. 90 Tablet 1    FLUoxetine (PROZAC) 20 MG Cap Take 1 Capsule by mouth every morning. Take in combination with Prozac 40 mg for total daily dose of  60 mg. 90 Capsule 1    fluoxetine (PROZAC) 40 MG capsule Take 1 Capsule by mouth every morning. Take in combination with Prozac 20 mg for total daily dose of 60 mg. 90 Capsule 1    traZODone (DESYREL) 100 MG Tab Take 1 Tablet by mouth at bedtime. 90 Tablet 1    prazosin (MINIPRESS) 2 MG Cap Take 1 Capsule by mouth 2 times a day. 180 Capsule 1    Cetirizine HCl (ZYRTEC ALLERGY PO) Take  by mouth.      Fluticasone Propionate (FLONASE NA) Administer  into affected nostril(S).      diphenhydrAMINE (BENADRYL) 25 MG Tab Take 25 mg by mouth every 6 hours as needed for Sleep.      hydroxychloroquine (PLAQUENIL) 200 MG Tab Take 2 Tablets by mouth every day. 180 Tablet 0    methotrexate 2.5 MG tablet Take 6 Tablets by mouth every 7 days. 78 Tablet 1    lamoTRIgine (LAMICTAL) 100 MG Tab Take 1 Tablet by mouth every morning. 90 Tablet 1    folic acid (FOLVITE) 1 MG Tab Take 1 Tablet by mouth every day. 90 Tablet 3    Cholecalciferol (VITAMIN D3 PO)       aspirin EC (ECOTRIN) 81 MG Tablet Delayed Response Take 1 Tablet by mouth every day.      NURTEC 75 MG TABLET DISPERSIBLE Take 1 Tablet by mouth 1 time a day as needed (migraine). (Patient not taking: Reported on 8/13/2024) 60 Tablet 1     No current Epic-ordered facility-administered medications on file.       Review of systems:  Per HPI    Patient Active Problem List    Diagnosis Date Noted    MDD (major depressive disorder), recurrent episode, mild ([facility]) 06/08/2023    Obesity (BMI 30-39.9) 03/10/2023    Stress incontinence 04/15/2022    COVID-19 12/29/2021    Leah-menopausal 03/23/2021    Intractable migraine without aura and without status migrainosus 03/23/2021    Cigarette nicotine dependence without complication 03/23/2021    Sjogren's syndrome ([facility]) 12/05/2019    Neck pain 12/05/2019    SAMUEL (generalized anxiety disorder) 10/30/2018    SLE (systemic lupus erythematosus related syndrome) ([facility]) 10/20/2018    Fibromyalgia 07/06/2018    Seasonal allergies 07/06/2018     Chronic insomnia 07/06/2018    PTSD (post-traumatic stress disorder) 07/06/2018     Past Surgical History:   Procedure Laterality Date    DENTAL EXTRACTION(S)  1996    KNEE ARTHROSCOPY      PRIMARY C SECTION      TUBAL LIGATION       Social History     Tobacco Use    Smoking status: Former     Current packs/day: 0.50     Average packs/day: 0.5 packs/day for 21.0 years (10.5 ttl pk-yrs)     Types: Cigarettes    Smokeless tobacco: Never   Vaping Use    Vaping status: Every Day    Substances: Nicotine   Substance Use Topics    Alcohol use: Yes     Comment: rare    Drug use: Yes     Types: Marijuana, Oral     Comment: edible few times a week      Family History   Problem Relation Age of Onset    Cancer Mother         uterine    Other Mother         celiac    Depression Father     Lung Disease Father     Bladder cancer Father     Other Father         drug addict    Heart Disease Sister     Other Sister         mental retardation    Bipolar disorder Brother     Other Brother         addiction    Other Brother         paranoid schizophrenia, drug addiction    Prostate cancer Maternal Grandfather     Breast Cancer Maternal Grandmother     Other Maternal Grandmother         stroke    Heart Disease Paternal Grandfather     Heart Disease Paternal Grandmother      Current Outpatient Medications   Medication Sig Dispense Refill    cyclobenzaprine (FLEXERIL) 10 mg Tab Take 1 Tablet by mouth 1 time a day as needed for Muscle Spasms. 30 Tablet 6    Tirzepatide (MOUNJARO) 2.5 MG/0.5ML Solution Pen-injector Inject 2.5 mg under the skin every 7 days for 30 days. 0.5 mL 0    pregabalin (LYRICA) 75 MG Cap Take 1 Capsule by mouth 2 times a day for 90 days. 180 Capsule 0    cevimeline (EVOXAC) 30 MG capsule Take 1 Capsule by mouth in the morning, at noon, and at bedtime. 90 Capsule 3    ARIPiprazole (ABILIFY) 2 MG tablet Take 1 Tablet by mouth every morning. 90 Tablet 1    FLUoxetine (PROZAC) 20 MG Cap Take 1 Capsule by mouth every  "morning. Take in combination with Prozac 40 mg for total daily dose of 60 mg. 90 Capsule 1    fluoxetine (PROZAC) 40 MG capsule Take 1 Capsule by mouth every morning. Take in combination with Prozac 20 mg for total daily dose of 60 mg. 90 Capsule 1    traZODone (DESYREL) 100 MG Tab Take 1 Tablet by mouth at bedtime. 90 Tablet 1    prazosin (MINIPRESS) 2 MG Cap Take 1 Capsule by mouth 2 times a day. 180 Capsule 1    Cetirizine HCl (ZYRTEC ALLERGY PO) Take  by mouth.      Fluticasone Propionate (FLONASE NA) Administer  into affected nostril(S).      diphenhydrAMINE (BENADRYL) 25 MG Tab Take 25 mg by mouth every 6 hours as needed for Sleep.      hydroxychloroquine (PLAQUENIL) 200 MG Tab Take 2 Tablets by mouth every day. 180 Tablet 0    methotrexate 2.5 MG tablet Take 6 Tablets by mouth every 7 days. 78 Tablet 1    lamoTRIgine (LAMICTAL) 100 MG Tab Take 1 Tablet by mouth every morning. 90 Tablet 1    folic acid (FOLVITE) 1 MG Tab Take 1 Tablet by mouth every day. 90 Tablet 3    Cholecalciferol (VITAMIN D3 PO)       aspirin EC (ECOTRIN) 81 MG Tablet Delayed Response Take 1 Tablet by mouth every day.      NURTEC 75 MG TABLET DISPERSIBLE Take 1 Tablet by mouth 1 time a day as needed (migraine). (Patient not taking: Reported on 8/13/2024) 60 Tablet 1     No current facility-administered medications for this visit.       Allergies:  Allergies   Allergen Reactions    Codeine     Other Drug      NO NARCOTIC PAIN KILLERS    RISK OF ADDICTION--------PER PATIENT       Allergies, past medical history, past surgical history, family history, social history reviewed and updated.    Objective:    /76   Pulse 79   Temp 36.5 °C (97.7 °F) (Temporal)   Resp 16   Ht 1.626 m (5' 4\")   Wt 99.3 kg (218 lb 14.7 oz)   SpO2 96%   BMI 37.58 kg/m²    Body mass index is 37.58 kg/m².    Physical exam:  General: Normal appearance, no acute distress, not ill-appearing  HEENT: EOM intact, conjunctiva normal limits, negative right/left eye " discharge.  Sclera anicteric  Cardiovascular: Normal rate and rhythm, no murmurs  Pulmonary: No respiratory distress, no wheezing, no rales, breath sounds normal.  Musculoskeletal: No edema bilaterally  Skin: Warm, dry, no lesions  Neurological: No focal deficits, normal gait  Psychiatric: Mood within normal limits    Assessment/Plan:    Assessment & Plan  1. Dry mouth secondary to Sjogren's syndrome.  She has tried all the mechanical and salivary inducing techniques. We are going to start with cevilamine and see how she does. She is also going to continue following up with rheumatology and as well as seeing a dentist for her dry mouth.    2. Obesity and weight loss management.  Phentermine is contraindicated to due to her history of tachycardia. We discussed risks and side effects of glp1a. Rx mounjaro. Advised her that insurance may not cover    3. Fibromyalgia.  Chronic, stable condition. Continue current regimen.   I am going to refill her Lyrica, which she takes 1 capsule twice a day as well as her Flexeril as needed.    Follow-up  The patient will follow up in 4 months.       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

## 2024-11-29 DIAGNOSIS — F51.04 CHRONIC INSOMNIA: ICD-10-CM

## 2024-11-29 DIAGNOSIS — F33.0 MDD (MAJOR DEPRESSIVE DISORDER), RECURRENT EPISODE, MILD (HCC): ICD-10-CM

## 2024-12-02 RX ORDER — TRAZODONE HYDROCHLORIDE 100 MG/1
100 TABLET ORAL
Qty: 90 TABLET | Refills: 0 | Status: SHIPPED | OUTPATIENT
Start: 2024-12-02

## 2024-12-02 RX ORDER — ARIPIPRAZOLE 2 MG/1
2 TABLET ORAL EVERY MORNING
Qty: 90 TABLET | Refills: 0 | Status: SHIPPED | OUTPATIENT
Start: 2024-12-02

## 2025-02-21 DIAGNOSIS — M79.7 FIBROMYALGIA: ICD-10-CM

## 2025-02-21 RX ORDER — PREGABALIN 75 MG/1
CAPSULE ORAL
Qty: 60 CAPSULE | Refills: 0 | Status: SHIPPED | OUTPATIENT
Start: 2025-02-21 | End: 2025-03-23

## 2025-06-07 DIAGNOSIS — M79.7 FIBROMYALGIA: Primary | ICD-10-CM

## 2025-06-10 RX ORDER — PREGABALIN 75 MG/1
75 CAPSULE ORAL 2 TIMES DAILY
Qty: 180 CAPSULE | Refills: 0 | Status: SHIPPED | OUTPATIENT
Start: 2025-06-10 | End: 2025-09-08